# Patient Record
Sex: FEMALE | Race: WHITE | Employment: FULL TIME | URBAN - METROPOLITAN AREA
[De-identification: names, ages, dates, MRNs, and addresses within clinical notes are randomized per-mention and may not be internally consistent; named-entity substitution may affect disease eponyms.]

---

## 2017-01-18 ENCOUNTER — OFFICE VISIT (OUTPATIENT)
Dept: INTERNAL MEDICINE CLINIC | Facility: CLINIC | Age: 52
End: 2017-01-18

## 2017-01-18 VITALS
DIASTOLIC BLOOD PRESSURE: 76 MMHG | RESPIRATION RATE: 16 BRPM | BODY MASS INDEX: 33.58 KG/M2 | SYSTOLIC BLOOD PRESSURE: 112 MMHG | HEART RATE: 76 BPM | HEIGHT: 65.5 IN | WEIGHT: 204 LBS

## 2017-01-18 DIAGNOSIS — E53.8 B12 DEFICIENCY: ICD-10-CM

## 2017-01-18 DIAGNOSIS — Z51.81 ENCOUNTER FOR THERAPEUTIC DRUG MONITORING: Primary | ICD-10-CM

## 2017-01-18 DIAGNOSIS — E66.01 MORBID OBESITY WITH BMI OF 40.0-44.9, ADULT (HCC): ICD-10-CM

## 2017-01-18 DIAGNOSIS — E03.9 HYPOTHYROIDISM, UNSPECIFIED TYPE: ICD-10-CM

## 2017-01-18 DIAGNOSIS — L65.9 HAIR LOSS: ICD-10-CM

## 2017-01-18 DIAGNOSIS — Z83.2 FAMILY HISTORY OF ANEMIA: ICD-10-CM

## 2017-01-18 PROCEDURE — 99213 OFFICE O/P EST LOW 20 MIN: CPT | Performed by: NURSE PRACTITIONER

## 2017-01-18 PROCEDURE — 96372 THER/PROPH/DIAG INJ SC/IM: CPT | Performed by: NURSE PRACTITIONER

## 2017-01-18 RX ORDER — METFORMIN HYDROCHLORIDE 750 MG/1
750 TABLET, EXTENDED RELEASE ORAL DAILY
Qty: 30 TABLET | Refills: 2 | Status: SHIPPED | OUTPATIENT
Start: 2017-01-18 | End: 2017-03-10

## 2017-01-18 RX ORDER — PHENTERMINE HYDROCHLORIDE 15 MG/1
15 CAPSULE ORAL EVERY MORNING
Qty: 30 CAPSULE | Refills: 1 | Status: SHIPPED | OUTPATIENT
Start: 2017-01-18 | End: 2017-03-10

## 2017-01-18 RX ORDER — CYANOCOBALAMIN 1000 UG/ML
1000 INJECTION INTRAMUSCULAR; SUBCUTANEOUS ONCE
Status: COMPLETED | OUTPATIENT
Start: 2017-01-18 | End: 2017-01-18

## 2017-01-18 RX ADMIN — CYANOCOBALAMIN 1000 MCG: 1000 INJECTION INTRAMUSCULAR; SUBCUTANEOUS at 17:42:00

## 2017-01-18 NOTE — PROGRESS NOTES
CC: Patient presents with:  Weight Check: lost 1 pound       HPI:         Current Outpatient Prescriptions:  Phentermine HCl 15 MG Oral Cap Take 1 capsule (15 mg total) by mouth every morning.  Disp: 30 capsule Rfl: 1   MetFORMIN HCl  MG Oral Ta change her diet.  Congratulated her on great hard work and weighed on body fat scale 38%  Reminded about sleep study. 5th B12 injection today. Encouraged to find someplace to do cardio even taking a class.      The patient indicates understanding of these i

## 2017-01-18 NOTE — PATIENT INSTRUCTIONS
Diagnosing a Thyroid Problem     Fine needle aspiration. Your healthcare provider thinks you may have a thyroid problem. In many cases, a thyroid problem can be easy to diagnose.  Your healthcare provider is likely to ask about your medical history, giv · Ultrasound. This test uses sound waves to create an image showing the size and shape of the thyroid gland. It is most often used to check for a lump in the thyroid (nodule) or enlarged thyroid(goiter).   · Radioactive iodine uptake test. This test measure

## 2017-01-21 NOTE — PROGRESS NOTES
CC: Patient presents with:  Weight Check: lost 1 pound       HPI:   Obesity. Patient tolerating phentermine and metformin without side effects and has been meeting with Maribel Douglas regularly.   Was a bit more difficult to resist sweets over holidays but she MetFORMIN HCl  MG Oral Tablet 24 Hr 30 tablet 2      Sig: Take 1 tablet (750 mg total) by mouth daily.           None     ASSESSMENT:   Encounter for therapeutic drug monitoring  (primary encounter diagnosis)  Morbid obesity with BMI of 40.0-44.9,

## 2017-01-26 ENCOUNTER — OFFICE VISIT (OUTPATIENT)
Dept: INTERNAL MEDICINE CLINIC | Facility: CLINIC | Age: 52
End: 2017-01-26

## 2017-01-26 DIAGNOSIS — E66.9 OBESITY (BMI 30.0-34.9): ICD-10-CM

## 2017-01-26 PROCEDURE — 97803 MED NUTRITION INDIV SUBSEQ: CPT | Performed by: DIETITIAN, REGISTERED

## 2017-01-27 VITALS — BODY MASS INDEX: 33 KG/M2 | WEIGHT: 203 LBS

## 2017-01-27 NOTE — PROGRESS NOTES
FOLLOW UP NUTRITION CONSULTATION    Nutrition Assessment    Number of consultations with dietitian: 6    Height:  Ht Readings from Last 1 Encounters:  01/18/17 : 65.5\"      Weight:   Wt Readings from Last 2 Encounters:  01/26/17 : 203 lb  01/18/17 : 20

## 2017-02-23 ENCOUNTER — OFFICE VISIT (OUTPATIENT)
Dept: INTERNAL MEDICINE CLINIC | Facility: CLINIC | Age: 52
End: 2017-02-23

## 2017-02-23 DIAGNOSIS — E66.9 OBESITY (BMI 30.0-34.9): ICD-10-CM

## 2017-02-23 PROCEDURE — 97803 MED NUTRITION INDIV SUBSEQ: CPT | Performed by: DIETITIAN, REGISTERED

## 2017-02-26 VITALS — BODY MASS INDEX: 33 KG/M2 | WEIGHT: 199 LBS

## 2017-02-27 NOTE — PROGRESS NOTES
FOLLOW UP NUTRITION CONSULTATION    Nutrition Assessment    Number of consultations with dietitian: 6    Height:  Ht Readings from Last 1 Encounters:  01/18/17 : 65.5\"      Weight:   Wt Readings from Last 2 Encounters:  02/23/17 : 199 lb  01/26/17 : 20

## 2017-03-04 ENCOUNTER — LAB ENCOUNTER (OUTPATIENT)
Dept: LAB | Age: 52
End: 2017-03-04
Attending: NURSE PRACTITIONER
Payer: COMMERCIAL

## 2017-03-04 DIAGNOSIS — L65.9 LOSS OF HAIR: ICD-10-CM

## 2017-03-04 DIAGNOSIS — E03.9 HYPOTHYROIDISM, UNSPECIFIED TYPE: ICD-10-CM

## 2017-03-04 DIAGNOSIS — L65.9 HAIR LOSS: ICD-10-CM

## 2017-03-04 DIAGNOSIS — R63.5 ABNORMAL WEIGHT GAIN: Primary | ICD-10-CM

## 2017-03-04 DIAGNOSIS — N92.0 MENORRHAGIA WITH REGULAR CYCLE: ICD-10-CM

## 2017-03-04 LAB
ALBUMIN SERPL-MCNC: 3.4 G/DL (ref 3.5–4.8)
ALP LIVER SERPL-CCNC: 65 U/L (ref 39–100)
ALT SERPL-CCNC: 26 U/L (ref 14–54)
AST SERPL-CCNC: 14 U/L (ref 15–41)
BASOPHILS # BLD AUTO: 0.04 X10(3) UL (ref 0–0.1)
BASOPHILS NFR BLD AUTO: 0.7 %
BILIRUB SERPL-MCNC: 0.6 MG/DL (ref 0.1–2)
BUN BLD-MCNC: 15 MG/DL (ref 8–20)
CALCIUM BLD-MCNC: 9.3 MG/DL (ref 8.3–10.3)
CHLORIDE: 110 MMOL/L (ref 101–111)
CHOLEST SMN-MCNC: 162 MG/DL (ref ?–200)
CO2: 28 MMOL/L (ref 22–32)
CREAT BLD-MCNC: 0.77 MG/DL (ref 0.55–1.02)
DEPRECATED HBV CORE AB SER IA-ACNC: 144.4 NG/ML (ref 10–291)
EOSINOPHIL # BLD AUTO: 0.1 X10(3) UL (ref 0–0.3)
EOSINOPHIL NFR BLD AUTO: 1.7 %
ERYTHROCYTE [DISTWIDTH] IN BLOOD BY AUTOMATED COUNT: 12.7 % (ref 11.5–16)
FOLATE (FOLIC ACID), SERUM: 6.8 NG/ML (ref 8.7–24)
FREE T4: 1.4 NG/DL (ref 0.9–1.8)
GLUCOSE BLD-MCNC: 90 MG/DL (ref 70–99)
HAV AB SERPL IA-ACNC: 550 PG/ML (ref 193–986)
HCT VFR BLD AUTO: 40.7 % (ref 34–50)
HDLC SERPL-MCNC: 58 MG/DL (ref 45–?)
HDLC SERPL: 2.79 {RATIO} (ref ?–4.44)
HGB BLD-MCNC: 13.1 G/DL (ref 12–16)
IMMATURE GRANULOCYTE COUNT: 0.02 X10(3) UL (ref 0–1)
IMMATURE GRANULOCYTE RATIO %: 0.3 %
LDLC SERPL CALC-MCNC: 93 MG/DL (ref ?–130)
LYMPHOCYTES # BLD AUTO: 2.55 X10(3) UL (ref 0.9–4)
LYMPHOCYTES NFR BLD AUTO: 42.4 %
M PROTEIN MFR SERPL ELPH: 7 G/DL (ref 6.1–8.3)
MCH RBC QN AUTO: 29.6 PG (ref 27–33.2)
MCHC RBC AUTO-ENTMCNC: 32.2 G/DL (ref 31–37)
MCV RBC AUTO: 92.1 FL (ref 81–100)
MONOCYTES # BLD AUTO: 0.5 X10(3) UL (ref 0.1–0.6)
MONOCYTES NFR BLD AUTO: 8.3 %
NEUTROPHIL ABS PRELIM: 2.8 X10 (3) UL (ref 1.3–6.7)
NEUTROPHILS # BLD AUTO: 2.8 X10(3) UL (ref 1.3–6.7)
NEUTROPHILS NFR BLD AUTO: 46.6 %
NONHDLC SERPL-MCNC: 104 MG/DL (ref ?–130)
PLATELET # BLD AUTO: 290 10(3)UL (ref 150–450)
POTASSIUM SERPL-SCNC: 4.4 MMOL/L (ref 3.6–5.1)
RBC # BLD AUTO: 4.42 X10(6)UL (ref 3.8–5.1)
RED CELL DISTRIBUTION WIDTH-SD: 42.5 FL (ref 35.1–46.3)
SODIUM SERPL-SCNC: 146 MMOL/L (ref 136–144)
T3FREE SERPL-MCNC: 2.25 PG/ML (ref 2.3–4.2)
TRIGLYCERIDES: 57 MG/DL (ref ?–150)
TSI SER-ACNC: 1 MIU/ML (ref 0.35–5.5)
VLDL: 11 MG/DL (ref 5–40)
WBC # BLD AUTO: 6 X10(3) UL (ref 4–13)

## 2017-03-04 PROCEDURE — 82746 ASSAY OF FOLIC ACID SERUM: CPT

## 2017-03-04 PROCEDURE — 84481 FREE ASSAY (FT-3): CPT

## 2017-03-04 PROCEDURE — 84439 ASSAY OF FREE THYROXINE: CPT

## 2017-03-04 PROCEDURE — 36415 COLL VENOUS BLD VENIPUNCTURE: CPT

## 2017-03-04 PROCEDURE — 80061 LIPID PANEL: CPT

## 2017-03-04 PROCEDURE — 82607 VITAMIN B-12: CPT

## 2017-03-04 PROCEDURE — 85025 COMPLETE CBC W/AUTO DIFF WBC: CPT

## 2017-03-04 PROCEDURE — 82728 ASSAY OF FERRITIN: CPT

## 2017-03-04 PROCEDURE — 84482 T3 REVERSE: CPT

## 2017-03-04 PROCEDURE — 84443 ASSAY THYROID STIM HORMONE: CPT

## 2017-03-04 PROCEDURE — 86038 ANTINUCLEAR ANTIBODIES: CPT

## 2017-03-04 PROCEDURE — 80053 COMPREHEN METABOLIC PANEL: CPT

## 2017-03-05 LAB — ANA SCREEN: NEGATIVE

## 2017-03-08 LAB — TRIIODOTHYRONINE, REVERSE: 23.9 NG/DL

## 2017-03-10 ENCOUNTER — OFFICE VISIT (OUTPATIENT)
Dept: INTERNAL MEDICINE CLINIC | Facility: CLINIC | Age: 52
End: 2017-03-10

## 2017-03-10 VITALS
HEIGHT: 65.5 IN | WEIGHT: 198 LBS | DIASTOLIC BLOOD PRESSURE: 80 MMHG | BODY MASS INDEX: 32.59 KG/M2 | HEART RATE: 80 BPM | SYSTOLIC BLOOD PRESSURE: 132 MMHG | RESPIRATION RATE: 16 BRPM

## 2017-03-10 DIAGNOSIS — E66.01 MORBID OBESITY WITH BMI OF 40.0-44.9, ADULT (HCC): ICD-10-CM

## 2017-03-10 DIAGNOSIS — Z51.81 ENCOUNTER FOR THERAPEUTIC DRUG MONITORING: Primary | ICD-10-CM

## 2017-03-10 PROCEDURE — 99213 OFFICE O/P EST LOW 20 MIN: CPT | Performed by: NURSE PRACTITIONER

## 2017-03-10 PROCEDURE — 96372 THER/PROPH/DIAG INJ SC/IM: CPT | Performed by: NURSE PRACTITIONER

## 2017-03-10 RX ORDER — CYANOCOBALAMIN 1000 UG/ML
1000 INJECTION INTRAMUSCULAR; SUBCUTANEOUS ONCE
Status: COMPLETED | OUTPATIENT
Start: 2017-03-10 | End: 2017-03-10

## 2017-03-10 RX ORDER — PHENTERMINE HYDROCHLORIDE 15 MG/1
15 CAPSULE ORAL EVERY MORNING
Qty: 30 CAPSULE | Refills: 1 | Status: SHIPPED | OUTPATIENT
Start: 2017-03-10 | End: 2017-05-09

## 2017-03-10 RX ORDER — METFORMIN HYDROCHLORIDE 750 MG/1
750 TABLET, EXTENDED RELEASE ORAL DAILY
Qty: 30 TABLET | Refills: 2 | Status: SHIPPED | OUTPATIENT
Start: 2017-03-10 | End: 2017-06-09

## 2017-03-10 RX ADMIN — CYANOCOBALAMIN 1000 MCG: 1000 INJECTION INTRAMUSCULAR; SUBCUTANEOUS at 17:01:00

## 2017-03-10 NOTE — PATIENT INSTRUCTIONS
Weight Management: Take It Off and Keep It Off  It’s easy to be motivated when you first start. The key is to stay motivated all along the way and to have realistic and achievable goals.  There are things you can do to keep yourself on the path to success · Make a list of the things that others like about you and that you like about yourself. Add something new from time to time. Keep this list to look at when you need a lift. Resources  · The [employer] Nutritionwww. fitness. go

## 2017-03-10 NOTE — PROGRESS NOTES
CC: Patient presents with:  Weight Check: lost 6 pounds       HPI:   Obesity. Patient continues to tolerate phentermine and metformin without problem but had not been in since January as had the flu so had to cancel last scheduled appt.   She did not diagnosis)  Morbid obesity with BMI of 40.0-44.9, adult (HCC)     PLAN:  1.  Morbid Obesity (BMI 40.0-44.9).   Patient lost 42 lbs on 6 month of [id number] and metformin.  Denies chest pain willl continue phentemrine and  Metformin.  Meeting with Rafal Scott

## 2017-04-24 ENCOUNTER — OFFICE VISIT (OUTPATIENT)
Dept: INTERNAL MEDICINE CLINIC | Facility: CLINIC | Age: 52
End: 2017-04-24

## 2017-04-24 VITALS — WEIGHT: 198 LBS | BODY MASS INDEX: 32 KG/M2

## 2017-04-24 DIAGNOSIS — E66.9 OBESITY (BMI 30.0-34.9): ICD-10-CM

## 2017-04-24 PROCEDURE — 97803 MED NUTRITION INDIV SUBSEQ: CPT | Performed by: DIETITIAN, REGISTERED

## 2017-04-25 NOTE — PROGRESS NOTES
FOLLOW UP NUTRITION CONSULTATION    Nutrition Assessment    Number of consultations with dietitian: 7    Height:  Ht Readings from Last 1 Encounters:  03/10/17 : 65.5\"      Weight:   Wt Readings from Last 2 Encounters:  04/24/17 : 198 lb  03/10/17 : 19

## 2017-05-22 RX ORDER — PHENTERMINE HYDROCHLORIDE 15 MG/1
CAPSULE ORAL
Qty: 30 CAPSULE | Refills: 0 | OUTPATIENT
Start: 2017-05-22

## 2017-05-22 NOTE — TELEPHONE ENCOUNTER
Requesting Phentermine   LOV: 3/7/17  RTC: 4 weeks   Last Labs: n/a   Filled: 3/10/17 #30 with 1 refills    Future Appointments  Date Time Provider [place]   5/22/2017 5:00 PM Jules Harman RD [facility]   6/9/2017 8:40 AM Amari Javier

## 2017-05-31 ENCOUNTER — TELEPHONE (OUTPATIENT)
Dept: INTERNAL MEDICINE CLINIC | Facility: CLINIC | Age: 52
End: 2017-05-31

## 2017-06-09 ENCOUNTER — OFFICE VISIT (OUTPATIENT)
Dept: INTERNAL MEDICINE CLINIC | Facility: CLINIC | Age: 52
End: 2017-06-09

## 2017-06-09 VITALS
WEIGHT: 196 LBS | RESPIRATION RATE: 16 BRPM | DIASTOLIC BLOOD PRESSURE: 76 MMHG | HEIGHT: 65.5 IN | SYSTOLIC BLOOD PRESSURE: 110 MMHG | HEART RATE: 80 BPM | BODY MASS INDEX: 32.26 KG/M2

## 2017-06-09 DIAGNOSIS — E66.01 MORBID OBESITY WITH BMI OF 40.0-44.9, ADULT (HCC): ICD-10-CM

## 2017-06-09 DIAGNOSIS — Z51.81 ENCOUNTER FOR THERAPEUTIC DRUG MONITORING: Primary | ICD-10-CM

## 2017-06-09 PROCEDURE — 99213 OFFICE O/P EST LOW 20 MIN: CPT | Performed by: NURSE PRACTITIONER

## 2017-06-09 RX ORDER — LEVOTHYROXINE SODIUM 88 UG/1
88 TABLET ORAL
COMMUNITY
End: 2018-02-08

## 2017-06-09 RX ORDER — PHENTERMINE HYDROCHLORIDE 15 MG/1
15 CAPSULE ORAL EVERY MORNING
Qty: 30 CAPSULE | Refills: 1 | Status: CANCELLED | OUTPATIENT
Start: 2017-06-09 | End: 2017-08-08

## 2017-06-09 RX ORDER — METFORMIN HYDROCHLORIDE 750 MG/1
750 TABLET, EXTENDED RELEASE ORAL
COMMUNITY
End: 2017-06-09

## 2017-06-09 RX ORDER — PHENTERMINE HYDROCHLORIDE 37.5 MG/1
37.5 TABLET ORAL
Qty: 30 TABLET | Refills: 0 | Status: SHIPPED | OUTPATIENT
Start: 2017-06-09 | End: 2017-07-09

## 2017-06-09 RX ORDER — LACTOBACIL 2/BIFIDO 1/S.THERMO 450B CELL
1 PACKET (EA) ORAL EVERY MORNING
Qty: 60 CAPSULE | Refills: 0 | Status: SHIPPED | OUTPATIENT
Start: 2017-06-09 | End: 2017-06-09 | Stop reason: CLARIF

## 2017-06-09 RX ORDER — LACTOBACIL 2/BIFIDO 1/S.THERMO 450B CELL
1 PACKET (EA) ORAL EVERY MORNING
Qty: 60 CAPSULE | Refills: 0 | Status: SHIPPED | COMMUNITY
Start: 2017-06-09 | End: 2018-04-16 | Stop reason: ALTCHOICE

## 2017-06-09 RX ORDER — METFORMIN HYDROCHLORIDE 750 MG/1
750 TABLET, EXTENDED RELEASE ORAL DAILY
Qty: 30 TABLET | Refills: 2 | Status: SHIPPED | OUTPATIENT
Start: 2017-06-09 | End: 2017-09-07

## 2017-06-09 RX ORDER — PHENTERMINE HYDROCHLORIDE 15 MG/1
15 CAPSULE ORAL EVERY MORNING
COMMUNITY
End: 2017-06-09 | Stop reason: DRUGHIGH

## 2017-06-09 NOTE — PROGRESS NOTES
CC: Patient presents with:  Weight Check: lost 2 pounds       HPI:   Obesity. Patient has missed or had to reschedule a few appts due to work has not been in since 3/3/17. . 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  Morbid Obesity (BMI 40.0-44.9).   Patient lost 2 lbs on 3  month of phentermine 15mg and metformin.  Net loss is 44lb.   Denies chest pain willl continue phentermine but increase do

## 2017-06-09 NOTE — PATIENT INSTRUCTIONS
More Tips for Healthy Eating Out     Balance out your calories. If you're going out for dinner at a nice restaurant, make healthier choices during the day. Be creative when eating out. Many places don’t make you stick strictly to the menu.  Instead of o

## 2017-06-12 ENCOUNTER — TELEPHONE (OUTPATIENT)
Dept: INTERNAL MEDICINE CLINIC | Facility: CLINIC | Age: 52
End: 2017-06-12

## 2017-06-12 NOTE — TELEPHONE ENCOUNTER
Print Trail   Printed On Printed By Printed To Report   6/9/17 9:06 AM LEIDY Hwang [id number] EDW AMB MULTI MEDSCRIPT MASTER ARETHA CONTROLLED MEDS       Reconciliation Audit      Per records script was printed and given to patient.  Can she ch

## 2017-06-19 ENCOUNTER — OFFICE VISIT (OUTPATIENT)
Dept: INTERNAL MEDICINE CLINIC | Facility: CLINIC | Age: 52
End: 2017-06-19

## 2017-06-19 DIAGNOSIS — E66.9 OBESITY (BMI 30.0-34.9): ICD-10-CM

## 2017-06-19 PROCEDURE — 97803 MED NUTRITION INDIV SUBSEQ: CPT | Performed by: DIETITIAN, REGISTERED

## 2017-06-19 NOTE — PROGRESS NOTES
FOLLOW UP NUTRITION CONSULTATION    Nutrition Assessment    Number of consultations with dietitian: 8    Height:  Ht Readings from Last 1 Encounters:  06/09/17 : 65.5\"      Weight:   Wt Readings from Last 2 Encounters:  06/09/17 : 196 lb  04/24/17 : 19

## 2017-07-21 ENCOUNTER — OFFICE VISIT (OUTPATIENT)
Dept: INTERNAL MEDICINE CLINIC | Facility: CLINIC | Age: 52
End: 2017-07-21

## 2017-07-21 VITALS
BODY MASS INDEX: 31.77 KG/M2 | WEIGHT: 193 LBS | DIASTOLIC BLOOD PRESSURE: 60 MMHG | HEIGHT: 65.5 IN | HEART RATE: 72 BPM | SYSTOLIC BLOOD PRESSURE: 112 MMHG | RESPIRATION RATE: 16 BRPM

## 2017-07-21 VITALS — WEIGHT: 192.88 LBS | BODY MASS INDEX: 32 KG/M2

## 2017-07-21 DIAGNOSIS — E66.01 MORBID OBESITY WITH BMI OF 40.0-44.9, ADULT (HCC): Primary | ICD-10-CM

## 2017-07-21 DIAGNOSIS — E66.9 OBESITY (BMI 30.0-34.9): ICD-10-CM

## 2017-07-21 DIAGNOSIS — Z51.81 ENCOUNTER FOR THERAPEUTIC DRUG MONITORING: ICD-10-CM

## 2017-07-21 PROCEDURE — 97803 MED NUTRITION INDIV SUBSEQ: CPT | Performed by: DIETITIAN, REGISTERED

## 2017-07-21 PROCEDURE — 99213 OFFICE O/P EST LOW 20 MIN: CPT | Performed by: NURSE PRACTITIONER

## 2017-07-21 RX ORDER — PHENTERMINE HYDROCHLORIDE 37.5 MG/1
37.5 TABLET ORAL
COMMUNITY
End: 2017-07-21

## 2017-07-21 RX ORDER — PHENTERMINE HYDROCHLORIDE 37.5 MG/1
37.5 TABLET ORAL
Qty: 30 TABLET | Refills: 0 | Status: SHIPPED | OUTPATIENT
Start: 2017-07-21 | End: 2017-12-26

## 2017-07-21 NOTE — PROGRESS NOTES
CC: Patient presents with:  Weight Check: down 3lb       HPI:   Obesity. Patient tolerating increased phentermine and metformin. Is walking 3 miles each morning and working out at gym at night.  [facility] on [employer] therapeutic drug monitoring     PLAN:  1.  Morbid Obesity (BMI 40.0-44.9).   Patient lost 3 lbs on 6 weeks of phentermine 37.5 mg and metformin.  Net loss is 47lb.   Denies chest pain willl continue phentermine but increase dose for 2 months and continue Met

## 2017-07-21 NOTE — PROGRESS NOTES
FOLLOW UP NUTRITION CONSULTATION    Nutrition Assessment    Number of consultations with dietitian: 9    Height:  Ht Readings from Last 1 Encounters:  06/09/17 : 65.5\"      Weight:   Wt Readings from Last 2 Encounters:  07/21/17 : 192 lb 14.4 oz  06/09

## 2017-07-21 NOTE — PATIENT INSTRUCTIONS
Plan stress related activities as well before school starts up again. Keys to Managing Stress  There are several keys to managing stress. First, learn to recognize when you’re under stress and what triggers it.  Next, find positive ways of responding Relaxing can help you prevent or relieve stressful feelings. This tip may also help: When you’re facing a stressor, pause for a moment. Then take a deep breath and slowly breathe out as you count to 10.  This will help clear your mind so you can respond to Visualization is like taking a mental vacation. It frees your mind while keeping your body in a calm state. To get started, picture yourself feeling warm and relaxed. Choose a peaceful setting that appeals to you and fill in the details.  If you imagine a t

## 2017-09-01 ENCOUNTER — TELEPHONE (OUTPATIENT)
Dept: INTERNAL MEDICINE CLINIC | Facility: CLINIC | Age: 52
End: 2017-09-01

## 2017-09-01 NOTE — TELEPHONE ENCOUNTER
Per provider patient being discharged for having 6th no show on the following dates:  1/13/17 with Marcine Bellow, 4/20/17 with Marcine Bellow, 5/9/17 with Marcine Bellow, 5/22/17 with Erwin Shearing, 8/14/17 with Erwin Shearing, and 8/25/17 with Marcine Bellow.       Certified kareem

## 2017-11-28 ENCOUNTER — OFFICE VISIT (OUTPATIENT)
Dept: FAMILY MEDICINE CLINIC | Facility: CLINIC | Age: 52
End: 2017-11-28

## 2017-11-28 VITALS
BODY MASS INDEX: 33.02 KG/M2 | RESPIRATION RATE: 16 BRPM | DIASTOLIC BLOOD PRESSURE: 78 MMHG | HEART RATE: 78 BPM | WEIGHT: 203 LBS | HEIGHT: 65.75 IN | SYSTOLIC BLOOD PRESSURE: 118 MMHG | OXYGEN SATURATION: 97 % | TEMPERATURE: 98 F

## 2017-11-28 DIAGNOSIS — Z51.81 ENCOUNTER FOR THERAPEUTIC DRUG MONITORING: ICD-10-CM

## 2017-11-28 DIAGNOSIS — Z13.21 ENCOUNTER FOR VITAMIN DEFICIENCY SCREENING: ICD-10-CM

## 2017-11-28 DIAGNOSIS — E53.8 VITAMIN B 12 DEFICIENCY: ICD-10-CM

## 2017-11-28 DIAGNOSIS — E66.9 OBESITY (BMI 30-39.9): Primary | ICD-10-CM

## 2017-11-28 DIAGNOSIS — E03.9 HYPOTHYROIDISM, UNSPECIFIED TYPE: ICD-10-CM

## 2017-11-28 PROCEDURE — 99204 OFFICE O/P NEW MOD 45 MIN: CPT | Performed by: EMERGENCY MEDICINE

## 2017-11-28 RX ORDER — PHENTERMINE HYDROCHLORIDE 37.5 MG/1
TABLET ORAL
Qty: 30 TABLET | Refills: 0 | Status: SHIPPED | OUTPATIENT
Start: 2017-11-28 | End: 2018-02-08

## 2017-11-28 RX ORDER — NYSTATIN 100000 U/G
CREAM TOPICAL
Refills: 0 | COMMUNITY
Start: 2017-11-25 | End: 2018-02-08 | Stop reason: ALTCHOICE

## 2017-11-28 NOTE — PROGRESS NOTES
Chief Complaint:   Patient presents with:  Establish Care: discuss medication for weight loss. HPI:   This is a 47year old female     ESTABLISHMENT OF CARE  Previously seen at Weight loss clinic. Lost total of 60 lbs on phentermine and Metformin.  Josh Cramp systems is negative except those stated as above    PHYSICAL EXAM:   /78 (BP Location: Left arm, Patient Position: Sitting, Cuff Size: adult)   Pulse 78   Temp 98.3 °F (36.8 °C) (Oral)   Resp 16   Ht 65.75\"   Wt 203 lb   LMP 11/28/2017   SpO2 97%

## 2017-12-26 ENCOUNTER — OFFICE VISIT (OUTPATIENT)
Dept: FAMILY MEDICINE CLINIC | Facility: CLINIC | Age: 52
End: 2017-12-26

## 2017-12-26 VITALS
BODY MASS INDEX: 32.47 KG/M2 | DIASTOLIC BLOOD PRESSURE: 78 MMHG | TEMPERATURE: 98 F | HEIGHT: 66 IN | SYSTOLIC BLOOD PRESSURE: 126 MMHG | RESPIRATION RATE: 16 BRPM | HEART RATE: 78 BPM | OXYGEN SATURATION: 97 % | WEIGHT: 202 LBS

## 2017-12-26 DIAGNOSIS — E03.9 HYPOTHYROIDISM, UNSPECIFIED TYPE: ICD-10-CM

## 2017-12-26 DIAGNOSIS — Z79.899 MEDICATION MANAGEMENT: ICD-10-CM

## 2017-12-26 DIAGNOSIS — E66.9 OBESITY (BMI 30-39.9): Primary | ICD-10-CM

## 2017-12-26 DIAGNOSIS — R03.0 ELEVATED BP WITHOUT DIAGNOSIS OF HYPERTENSION: ICD-10-CM

## 2017-12-26 PROCEDURE — 99214 OFFICE O/P EST MOD 30 MIN: CPT | Performed by: EMERGENCY MEDICINE

## 2017-12-26 RX ORDER — LISINOPRIL 20 MG/1
1 TABLET ORAL DAILY
Refills: 0 | COMMUNITY
Start: 2017-12-07 | End: 2018-02-08 | Stop reason: ALTCHOICE

## 2017-12-26 RX ORDER — COVID-19 ANTIGEN TEST
220 KIT MISCELLANEOUS
COMMUNITY
End: 2018-02-08 | Stop reason: ALTCHOICE

## 2017-12-26 NOTE — PROGRESS NOTES
Chief Complaint:   Patient presents with: Follow - Up: f/u meds    HPI:   This is a 47year old female     WEIGHT LOSS  Ff up weight loss. 3 weeks ago underwent a total hysterectomy for endometrial hyperplasia. Was prev on OCP.    Never started Phentermine the HPI.       PHYSICAL EXAM:   /78 (BP Location: Right arm, Patient Position: Sitting, Cuff Size: large)   Pulse 78   Temp 97.9 °F (36.6 °C) (Oral)   Resp 16   Ht 66\"   Wt 202 lb   LMP 11/28/2017   SpO2 97%   BMI 32.60 kg/m²  Estimated body mass ind

## 2017-12-26 NOTE — PATIENT INSTRUCTIONS
Thank you for choosing [facility]  To Do:  [facility]  1. Have blood tests done  2. Follow-up in 1 month's  3. Okay to resume metformin and phentermine  4. Monitor BP closely at home  5. OK to hold lisinopril for now  1.              Gino Keenan sure way to regain any weight you’ve lost. The lifestyle changes that help you lose extra weight can also help keep it off. This is why you need to make realistic changes you can stick with.   Fiction: “Low-fat and fat-free mean low-calorie.”  Fact: All yecenia these changes. When you can stick to this plan, keep making a few more small changes. Taking small steps will help you stay on the path to success. Track your progress  Write down your goals. Then, keep a daily record of your progress.  Write down what you

## 2017-12-28 PROBLEM — R03.0 ELEVATED BP WITHOUT DIAGNOSIS OF HYPERTENSION: Status: ACTIVE | Noted: 2017-12-28

## 2017-12-28 PROBLEM — E03.9 HYPOTHYROIDISM: Status: ACTIVE | Noted: 2017-12-28

## 2018-02-08 ENCOUNTER — OFFICE VISIT (OUTPATIENT)
Dept: FAMILY MEDICINE CLINIC | Facility: CLINIC | Age: 53
End: 2018-02-08

## 2018-02-08 ENCOUNTER — LAB ENCOUNTER (OUTPATIENT)
Dept: LAB | Age: 53
End: 2018-02-08
Attending: EMERGENCY MEDICINE
Payer: COMMERCIAL

## 2018-02-08 VITALS
HEIGHT: 66 IN | HEART RATE: 72 BPM | OXYGEN SATURATION: 99 % | RESPIRATION RATE: 16 BRPM | SYSTOLIC BLOOD PRESSURE: 128 MMHG | TEMPERATURE: 98 F | WEIGHT: 201 LBS | BODY MASS INDEX: 32.3 KG/M2 | DIASTOLIC BLOOD PRESSURE: 82 MMHG

## 2018-02-08 DIAGNOSIS — D64.9 ANEMIA, UNSPECIFIED TYPE: ICD-10-CM

## 2018-02-08 DIAGNOSIS — Z13.21 ENCOUNTER FOR VITAMIN DEFICIENCY SCREENING: ICD-10-CM

## 2018-02-08 DIAGNOSIS — E66.9 OBESITY (BMI 30-39.9): ICD-10-CM

## 2018-02-08 DIAGNOSIS — E03.9 HYPOTHYROIDISM, UNSPECIFIED TYPE: ICD-10-CM

## 2018-02-08 DIAGNOSIS — Z51.81 ENCOUNTER FOR THERAPEUTIC DRUG MONITORING: ICD-10-CM

## 2018-02-08 DIAGNOSIS — E66.9 OBESITY (BMI 30-39.9): Primary | ICD-10-CM

## 2018-02-08 DIAGNOSIS — Z80.0 FAMILY HISTORY OF COLON CANCER IN MOTHER: ICD-10-CM

## 2018-02-08 DIAGNOSIS — E53.8 VITAMIN B 12 DEFICIENCY: ICD-10-CM

## 2018-02-08 LAB
25-HYDROXYVITAMIN D (TOTAL): 41.5 NG/ML (ref 30–100)
ANTI-THYROGLOBULIN: <15 U/ML (ref ?–60)
ANTI-THYROPEROXIDASE: 31 U/ML (ref ?–60)
BASOPHILS # BLD AUTO: 0.07 X10(3) UL (ref 0–0.1)
BASOPHILS NFR BLD AUTO: 1 %
CHOLEST SMN-MCNC: 187 MG/DL (ref ?–200)
DEPRECATED HBV CORE AB SER IA-ACNC: 71.1 NG/ML (ref 10–291)
EOSINOPHIL # BLD AUTO: 0.1 X10(3) UL (ref 0–0.3)
EOSINOPHIL NFR BLD AUTO: 1.5 %
ERYTHROCYTE [DISTWIDTH] IN BLOOD BY AUTOMATED COUNT: 12.9 % (ref 11.5–16)
EST. AVERAGE GLUCOSE BLD GHB EST-MCNC: 105 MG/DL (ref 68–126)
HAV AB SERPL IA-ACNC: 394 PG/ML (ref 193–986)
HBA1C MFR BLD HPLC: 5.3 % (ref ?–5.7)
HCT VFR BLD AUTO: 43.6 % (ref 34–50)
HDLC SERPL-MCNC: 70 MG/DL (ref 45–?)
HDLC SERPL: 2.67 {RATIO} (ref ?–4.44)
HGB BLD-MCNC: 13.4 G/DL (ref 12–16)
IMMATURE GRANULOCYTE COUNT: 0.01 X10(3) UL (ref 0–1)
IMMATURE GRANULOCYTE RATIO %: 0.1 %
IRON SATURATION: 17 % (ref 13–45)
IRON: 72 UG/DL (ref 28–170)
LDLC SERPL CALC-MCNC: 107 MG/DL (ref ?–130)
LYMPHOCYTES # BLD AUTO: 2.63 X10(3) UL (ref 0.9–4)
LYMPHOCYTES NFR BLD AUTO: 39.3 %
MCH RBC QN AUTO: 27.9 PG (ref 27–33.2)
MCHC RBC AUTO-ENTMCNC: 30.7 G/DL (ref 31–37)
MCV RBC AUTO: 90.8 FL (ref 81–100)
MONOCYTES # BLD AUTO: 0.45 X10(3) UL (ref 0.1–1)
MONOCYTES NFR BLD AUTO: 6.7 %
NEUTROPHIL ABS PRELIM: 3.44 X10 (3) UL (ref 1.3–6.7)
NEUTROPHILS # BLD AUTO: 3.44 X10(3) UL (ref 1.3–6.7)
NEUTROPHILS NFR BLD AUTO: 51.4 %
NONHDLC SERPL-MCNC: 117 MG/DL (ref ?–130)
PLATELET # BLD AUTO: 332 10(3)UL (ref 150–450)
RBC # BLD AUTO: 4.8 X10(6)UL (ref 3.8–5.1)
RED CELL DISTRIBUTION WIDTH-SD: 42.5 FL (ref 35.1–46.3)
TOTAL IRON BINDING CAPACITY: 431 UG/DL (ref 298–536)
TRANSFERRIN: 289 MG/DL (ref 200–360)
TRIGL SERPL-MCNC: 49 MG/DL (ref ?–150)
TSI SER-ACNC: 1.26 MIU/ML (ref 0.35–5.5)
VLDLC SERPL CALC-MCNC: 10 MG/DL (ref 5–40)
WBC # BLD AUTO: 6.7 X10(3) UL (ref 4–13)

## 2018-02-08 PROCEDURE — 83550 IRON BINDING TEST: CPT | Performed by: EMERGENCY MEDICINE

## 2018-02-08 PROCEDURE — 80061 LIPID PANEL: CPT | Performed by: EMERGENCY MEDICINE

## 2018-02-08 PROCEDURE — 84443 ASSAY THYROID STIM HORMONE: CPT | Performed by: EMERGENCY MEDICINE

## 2018-02-08 PROCEDURE — 85025 COMPLETE CBC W/AUTO DIFF WBC: CPT | Performed by: EMERGENCY MEDICINE

## 2018-02-08 PROCEDURE — 83540 ASSAY OF IRON: CPT | Performed by: EMERGENCY MEDICINE

## 2018-02-08 PROCEDURE — 36415 COLL VENOUS BLD VENIPUNCTURE: CPT | Performed by: EMERGENCY MEDICINE

## 2018-02-08 PROCEDURE — 82607 VITAMIN B-12: CPT | Performed by: EMERGENCY MEDICINE

## 2018-02-08 PROCEDURE — 83036 HEMOGLOBIN GLYCOSYLATED A1C: CPT | Performed by: EMERGENCY MEDICINE

## 2018-02-08 PROCEDURE — 82728 ASSAY OF FERRITIN: CPT | Performed by: EMERGENCY MEDICINE

## 2018-02-08 PROCEDURE — 82306 VITAMIN D 25 HYDROXY: CPT | Performed by: EMERGENCY MEDICINE

## 2018-02-08 PROCEDURE — 86376 MICROSOMAL ANTIBODY EACH: CPT | Performed by: EMERGENCY MEDICINE

## 2018-02-08 PROCEDURE — 99214 OFFICE O/P EST MOD 30 MIN: CPT | Performed by: EMERGENCY MEDICINE

## 2018-02-08 PROCEDURE — 86800 THYROGLOBULIN ANTIBODY: CPT | Performed by: EMERGENCY MEDICINE

## 2018-02-08 RX ORDER — LEVOTHYROXINE SODIUM 88 UG/1
88 TABLET ORAL
Qty: 30 TABLET | Refills: 0 | Status: SHIPPED | OUTPATIENT
Start: 2018-02-08 | End: 2019-04-23

## 2018-02-08 RX ORDER — PHENTERMINE HYDROCHLORIDE 37.5 MG/1
37.5 TABLET ORAL
Qty: 30 TABLET | Refills: 0 | Status: SHIPPED
Start: 2018-02-08 | End: 2018-04-16 | Stop reason: ALTCHOICE

## 2018-02-08 NOTE — PATIENT INSTRUCTIONS
Thank you for choosing [facility]  To Do:  [facility]  1. Try to exercise regulary  2. Continue with phentermine  3. Have blood tests done today  4. Follow up with dietician  5. Need to obtain colonoscopy results  1.                  O

## 2018-02-08 NOTE — PROGRESS NOTES
Chief Complaint:   Patient presents with:  Weight Check: Weight check     HPI:   This is a 47year old female       FF UP WEIGHT LOSS  Eating the same foods. Has been following the same diet regimen. C/O BM problems. Feels constipated.  Last BM was yesterda mother        REVIEW OF SYSTEMS:   Review of systems significant for constipation  The rest of the review of systems is negative except those stated as above    PHYSICAL EXAM:   /82   Pulse 72   Temp 97.8 °F (36.6 °C) (Oral)   Resp 16   Ht 66\"   Wt surgery. -     CBC WITH DIFFERENTIAL WITH PLATELET; Future  -     IRON AND TIBC; Future  -     FERRITIN; Future    Hypothyroidism, unspecified type  -     Levothyroxine Sodium (SYNTHROID) 88 MCG Oral Tab;  Take 1 tablet (88 mcg total) by mouth before break

## 2018-02-27 ENCOUNTER — OFFICE VISIT (OUTPATIENT)
Dept: NUTRITION | Facility: HOSPITAL | Age: 53
End: 2018-02-27
Attending: EMERGENCY MEDICINE
Payer: COMMERCIAL

## 2018-02-27 PROCEDURE — 97802 MEDICAL NUTRITION INDIV IN: CPT

## 2018-02-28 NOTE — PROGRESS NOTES
ADULT INITIAL OUTPATIENT NUTRITION CONSULTATION    Nutrition Assessment    Medical Diagnosis: Obesity    PMH: s/p hysterectomy     Client Hx: 47year old female    Meds: Metformin, Phentermine    Labs (18): CHOL: 187, HDL: 70, LDL: 107, T. bring in snacks (focused on protein, fiber, and healthy fats with whole grain carbs/fruit). Written materials were issued and explained, including meal plans and recipes for 1847-4120 kcal/day.  Pt agrees to try 1% milk, having small snacks between meals, a

## 2018-04-10 ENCOUNTER — TELEPHONE (OUTPATIENT)
Dept: FAMILY MEDICINE CLINIC | Facility: CLINIC | Age: 53
End: 2018-04-10

## 2018-04-16 ENCOUNTER — OFFICE VISIT (OUTPATIENT)
Dept: FAMILY MEDICINE CLINIC | Facility: CLINIC | Age: 53
End: 2018-04-16

## 2018-04-16 VITALS
RESPIRATION RATE: 16 BRPM | TEMPERATURE: 98 F | SYSTOLIC BLOOD PRESSURE: 122 MMHG | HEIGHT: 66 IN | DIASTOLIC BLOOD PRESSURE: 80 MMHG | WEIGHT: 214 LBS | HEART RATE: 82 BPM | BODY MASS INDEX: 34.39 KG/M2 | OXYGEN SATURATION: 98 %

## 2018-04-16 DIAGNOSIS — E66.9 OBESITY (BMI 30-39.9): Primary | ICD-10-CM

## 2018-04-16 PROCEDURE — 99213 OFFICE O/P EST LOW 20 MIN: CPT | Performed by: PHYSICIAN ASSISTANT

## 2018-04-16 RX ORDER — TOPIRAMATE 25 MG/1
25 TABLET ORAL 2 TIMES DAILY
Qty: 30 TABLET | Refills: 0 | Status: CANCELLED | OUTPATIENT
Start: 2018-04-16

## 2018-04-16 RX ORDER — PHENTERMINE HYDROCHLORIDE 37.5 MG/1
37.5 TABLET ORAL
Qty: 30 TABLET | Refills: 0 | Status: CANCELLED | OUTPATIENT
Start: 2018-04-16

## 2018-04-16 NOTE — PROGRESS NOTES
CC: Patient presents with:  Weight Check         HPI:  Kerrin Holter is a 47year old female who presents to follow up on weight loss. She has been on phentermine/metformin for two years.  She has lost about 60 lbs total. She walks daily [id number] steps pe daily.  - Start above medication. Side effects discussed   - Diet and exercise  - Recheck in one month.  If no weight loss will consider medication change

## 2018-06-05 ENCOUNTER — OFFICE VISIT (OUTPATIENT)
Dept: FAMILY MEDICINE CLINIC | Facility: CLINIC | Age: 53
End: 2018-06-05

## 2018-06-05 VITALS
RESPIRATION RATE: 16 BRPM | TEMPERATURE: 98 F | DIASTOLIC BLOOD PRESSURE: 78 MMHG | SYSTOLIC BLOOD PRESSURE: 120 MMHG | HEIGHT: 66 IN | OXYGEN SATURATION: 98 % | WEIGHT: 218 LBS | HEART RATE: 69 BPM | BODY MASS INDEX: 35.03 KG/M2

## 2018-06-05 DIAGNOSIS — E66.9 OBESITY (BMI 30-39.9): Primary | ICD-10-CM

## 2018-06-05 DIAGNOSIS — N89.8 VAGINAL DRYNESS: ICD-10-CM

## 2018-06-05 DIAGNOSIS — Z51.81 ENCOUNTER FOR THERAPEUTIC DRUG MONITORING: ICD-10-CM

## 2018-06-05 DIAGNOSIS — N95.1 HOT FLASH, MENOPAUSAL: ICD-10-CM

## 2018-06-05 PROCEDURE — 99213 OFFICE O/P EST LOW 20 MIN: CPT | Performed by: PHYSICIAN ASSISTANT

## 2018-06-05 RX ORDER — PHENTERMINE HYDROCHLORIDE 37.5 MG/1
37.5 TABLET ORAL
Qty: 30 TABLET | Refills: 0 | Status: CANCELLED | OUTPATIENT
Start: 2018-06-05

## 2018-06-05 NOTE — PROGRESS NOTES
CC: Patient presents with:  Medication Follow-Up         HPI:  Tyrese Banks is a 47year old female who presents to follow up on weight loss. She recently started topiramate/phentermine combo. She had a very hard time swallowing pill.  Did not lose an AM     2 tablets in PM  - Side effects discussed   - Diet and exercise encourage   - Recheck in 4 weeks     Encounter for therapeutic drug monitoring  -     Naltrexone-Bupropion HCl ER (CONTRAVE) 8-90 MG Oral Tablet 12 Hr; Week 1: 1 tablet in AM      None

## 2018-06-25 ENCOUNTER — TELEPHONE (OUTPATIENT)
Dept: FAMILY MEDICINE CLINIC | Facility: CLINIC | Age: 53
End: 2018-06-25

## 2018-06-25 DIAGNOSIS — E66.9 OBESITY (BMI 30-39.9): ICD-10-CM

## 2018-06-25 NOTE — TELEPHONE ENCOUNTER
Ok to stop Contrave  Stop Phentermine as well  Will Rx Qsymia, Rx will be faxed  Follow up as directed

## 2018-06-25 NOTE — TELEPHONE ENCOUNTER
Please see below message. Patient is requesting to switch from Contrave back to Qsymia. Please advise. Thank you!

## 2018-06-25 NOTE — TELEPHONE ENCOUNTER
Patient Sherrie Quezada recently started taking Naltrexone-Bupropion HCl ER (CONTRAVE) 8-90 MG Oral Tablet 12 Hr  Is experiencing headaches, wants to stop taking this medication and restart previous med, listed below.   Carlo/Miki on file        Phentermine

## 2018-09-21 ENCOUNTER — OFFICE VISIT (OUTPATIENT)
Dept: FAMILY MEDICINE CLINIC | Facility: CLINIC | Age: 53
End: 2018-09-21
Payer: COMMERCIAL

## 2018-09-21 VITALS
DIASTOLIC BLOOD PRESSURE: 80 MMHG | SYSTOLIC BLOOD PRESSURE: 130 MMHG | RESPIRATION RATE: 16 BRPM | TEMPERATURE: 99 F | HEART RATE: 96 BPM | BODY MASS INDEX: 37.99 KG/M2 | WEIGHT: 228 LBS | HEIGHT: 65 IN | OXYGEN SATURATION: 97 %

## 2018-09-21 DIAGNOSIS — J06.9 ACUTE URI: Primary | ICD-10-CM

## 2018-09-21 PROCEDURE — 99213 OFFICE O/P EST LOW 20 MIN: CPT | Performed by: NURSE PRACTITIONER

## 2018-09-21 RX ORDER — ERGOCALCIFEROL 1.25 MG/1
1 CAPSULE ORAL WEEKLY
COMMUNITY
Start: 2018-08-01

## 2018-09-21 NOTE — PATIENT INSTRUCTIONS
Use saline nasal spray  Gargle with warm salt water    Adult Self-Care for Colds    Colds are caused by viruses. They can't be cured with antibiotics. However, you can ease symptoms and support your body's efforts to heal itself.  No matter which symptoms When you first notice symptoms, ask your healthcare provider if antiviral medicines are appropriate. Antibiotics should not be taken for colds or flu.  Also, call your healthcare provider if you have any of the following symptoms or if you aren't feeling be

## 2018-09-21 NOTE — PROGRESS NOTES
CHIEF COMPLAINT:   Patient presents with:  Nasal Congestion: Loss of voice 2-3 days. HPI:   Mike Pierson is a 47year old female who presents for upper respiratory symptoms for  3 days. Patient reports mild nasal congestion and mild sore throat. EARS: TM's clear gray, no bulging, no retraction,no fluid, bony landmarks visualized  NOSE: Nostrils patent, pale yellow/green nasal discharge, nasal mucosa mildly inflamed  THROAT: Oral mucosa pink, moist. Posterior pharynx is minimally erythematous.  No e · Breathe steam or heated humidified air to open blocked nasal passages.  a hot shower or use a vaporizer. Be careful not to get burned by the steam.  · Saline nasal sprays and decongestant tablets help open a stuffy nose.  Antihistamines can also h © 2460-7753 [employer] [address]. [address] [facility] [place] [facility], [address] [place]. All rights reserved. This information is not intended as a substitute for professional medical care. Always follow your healthcare professional's instructions.             The

## 2018-10-02 ENCOUNTER — TELEPHONE (OUTPATIENT)
Dept: FAMILY MEDICINE CLINIC | Facility: CLINIC | Age: 53
End: 2018-10-02

## 2018-10-02 NOTE — TELEPHONE ENCOUNTER
LVM for patient that appointment was missed and she will be charged a $25.00 no show fee.  Pt arrived for appt Dr Tom Patton said that patient should not be charged for missed appt-patient was unable to get her in time due to her boss being called into jury d

## 2018-10-04 ENCOUNTER — OFFICE VISIT (OUTPATIENT)
Dept: FAMILY MEDICINE CLINIC | Facility: CLINIC | Age: 53
End: 2018-10-04
Payer: COMMERCIAL

## 2018-10-04 VITALS
WEIGHT: 229 LBS | OXYGEN SATURATION: 97 % | SYSTOLIC BLOOD PRESSURE: 110 MMHG | BODY MASS INDEX: 38 KG/M2 | DIASTOLIC BLOOD PRESSURE: 82 MMHG | RESPIRATION RATE: 17 BRPM | HEART RATE: 67 BPM

## 2018-10-04 DIAGNOSIS — Z79.899 MEDICATION MANAGEMENT: ICD-10-CM

## 2018-10-04 DIAGNOSIS — E66.9 OBESITY (BMI 30-39.9): ICD-10-CM

## 2018-10-04 DIAGNOSIS — J40 BRONCHITIS: ICD-10-CM

## 2018-10-04 DIAGNOSIS — J02.9 SORE THROAT: Primary | ICD-10-CM

## 2018-10-04 PROBLEM — I10 HYPERTENSION: Status: ACTIVE | Noted: 2018-10-04

## 2018-10-04 PROCEDURE — 99214 OFFICE O/P EST MOD 30 MIN: CPT | Performed by: EMERGENCY MEDICINE

## 2018-10-04 PROCEDURE — 87081 CULTURE SCREEN ONLY: CPT | Performed by: EMERGENCY MEDICINE

## 2018-10-04 PROCEDURE — 87880 STREP A ASSAY W/OPTIC: CPT | Performed by: EMERGENCY MEDICINE

## 2018-10-04 RX ORDER — ALBUTEROL SULFATE 90 UG/1
1-2 AEROSOL, METERED RESPIRATORY (INHALATION) EVERY 4 HOURS PRN
Qty: 1 INHALER | Refills: 1 | Status: SHIPPED | OUTPATIENT
Start: 2018-10-04 | End: 2019-01-28 | Stop reason: ALTCHOICE

## 2018-10-04 RX ORDER — METHYLPREDNISOLONE 4 MG/1
TABLET ORAL
Qty: 1 PACKAGE | Refills: 0 | Status: SHIPPED | OUTPATIENT
Start: 2018-10-04 | End: 2019-01-28 | Stop reason: ALTCHOICE

## 2018-10-04 RX ORDER — PHENTERMINE HYDROCHLORIDE 37.5 MG/1
TABLET ORAL
Qty: 30 TABLET | Refills: 0 | Status: SHIPPED
Start: 2018-10-04 | End: 2019-01-28 | Stop reason: ALTCHOICE

## 2018-10-04 NOTE — PATIENT INSTRUCTIONS
Thank you for choosing [facility]  To Do:  FOR RITA HANCOCK    · Start albuterol inhaler  · Start medrol dose pack steroid for cough/bronochitis  · Chest xray today  · Follow up in 1 month  · Start Phentermine  · Have EKG done        Viral a light diet is fine. Avoid dehydration by drinking 6 to 8 glasses of fluids per day (such as water, soft drinks, sports drinks, juices, tea, or soup). Extra fluids will help loosen secretions in the nose and lungs.   · Over-the-counter cough, cold, and sor

## 2018-10-04 NOTE — PROGRESS NOTES
Chief Complaint:   Patient presents with:  URI: Follow up, not any better since [facility] visit   Weight Check: Discuss weight gain     HPI:   This is a 47year old female       FF UP BRONCHITIS  Seen in the [facility] 10 d ago for bronchitis.  Had stabbing pain to th medications on file prior to visit. Counseling given: Not Answered         PROBLEM LIST     Patient Active Problem List:     Encounter for therapeutic drug monitoring     B12 deficiency     Obesity (BMI 30-39. 9)     Hypothyroidism     Elevated BP withou (CPT=71046); Future  -     Albuterol Sulfate HFA (PROAIR HFA) 108 (90 Base) MCG/ACT Inhalation Aero Soln; Inhale 1-2 puffs into the lungs every 4 (four) hours as needed for Wheezing or Shortness of Breath (or cough,  use with spacer).   -     methylPREDNISo

## 2018-10-05 ENCOUNTER — HOSPITAL ENCOUNTER (OUTPATIENT)
Dept: GENERAL RADIOLOGY | Age: 53
Discharge: HOME | End: 2018-10-05
Attending: EMERGENCY MEDICINE
Payer: COMMERCIAL

## 2018-10-05 DIAGNOSIS — J40 BRONCHITIS: ICD-10-CM

## 2018-10-05 PROCEDURE — 71046 X-RAY EXAM CHEST 2 VIEWS: CPT | Performed by: EMERGENCY MEDICINE

## 2018-11-06 ENCOUNTER — TELEPHONE (OUTPATIENT)
Dept: FAMILY MEDICINE CLINIC | Facility: CLINIC | Age: 53
End: 2018-11-06

## 2019-01-10 ENCOUNTER — TELEPHONE (OUTPATIENT)
Dept: FAMILY MEDICINE CLINIC | Facility: CLINIC | Age: 54
End: 2019-01-10

## 2019-01-28 ENCOUNTER — OFFICE VISIT (OUTPATIENT)
Dept: FAMILY MEDICINE CLINIC | Facility: CLINIC | Age: 54
End: 2019-01-28
Payer: COMMERCIAL

## 2019-01-28 VITALS
OXYGEN SATURATION: 98 % | WEIGHT: 233 LBS | TEMPERATURE: 98 F | DIASTOLIC BLOOD PRESSURE: 76 MMHG | RESPIRATION RATE: 16 BRPM | BODY MASS INDEX: 38.82 KG/M2 | HEART RATE: 67 BPM | SYSTOLIC BLOOD PRESSURE: 118 MMHG | HEIGHT: 65 IN

## 2019-01-28 DIAGNOSIS — Z13.29 SCREENING FOR ENDOCRINE, NUTRITIONAL, METABOLIC AND IMMUNITY DISORDER: ICD-10-CM

## 2019-01-28 DIAGNOSIS — E66.9 OBESITY (BMI 30-39.9): Primary | ICD-10-CM

## 2019-01-28 DIAGNOSIS — Z13.0 SCREENING FOR ENDOCRINE, NUTRITIONAL, METABOLIC AND IMMUNITY DISORDER: ICD-10-CM

## 2019-01-28 DIAGNOSIS — Z13.21 SCREENING FOR ENDOCRINE, NUTRITIONAL, METABOLIC AND IMMUNITY DISORDER: ICD-10-CM

## 2019-01-28 DIAGNOSIS — Z13.228 SCREENING FOR ENDOCRINE, NUTRITIONAL, METABOLIC AND IMMUNITY DISORDER: ICD-10-CM

## 2019-01-28 PROCEDURE — 99213 OFFICE O/P EST LOW 20 MIN: CPT | Performed by: EMERGENCY MEDICINE

## 2019-01-28 RX ORDER — ESTRADIOL 0.04 MG/D
PATCH TRANSDERMAL
Refills: 5 | COMMUNITY
Start: 2018-08-07 | End: 2019-01-28 | Stop reason: ALTCHOICE

## 2019-01-28 RX ORDER — INHALER, ASSIST DEVICES
SPACER (EA) MISCELLANEOUS
Refills: 0 | COMMUNITY
Start: 2018-10-04 | End: 2019-01-28 | Stop reason: ALTCHOICE

## 2019-01-28 RX ORDER — PHENTERMINE HYDROCHLORIDE 37.5 MG/1
TABLET ORAL
Qty: 30 TABLET | Refills: 0 | Status: SHIPPED
Start: 2019-01-28 | End: 2019-04-23

## 2019-01-28 RX ORDER — METFORMIN HYDROCHLORIDE 500 MG/1
TABLET, EXTENDED RELEASE ORAL
Refills: 1 | COMMUNITY
Start: 2019-01-19 | End: 2019-05-30

## 2019-01-28 NOTE — PROGRESS NOTES
Chief Complaint:   Patient presents with:  Medication Follow-Up: weight loss    HPI:   This is a 47year old female     FOLLOW UP WEIGHT LOSS  Patient lost to follow-up, on last office visit was started on phentermine.   Unsure if phentermine to help wi Acute URI     Hypertension        REVIEW OF SYSTEMS:   Review of systems significant for weight gain.   The rest of the review of systems is negative except those stated as above    PHYSICAL EXAM:   /76   Pulse 67   Temp 98 °F (36.7 °C) (Oral)   Resp fasting    Extensive discussion of weight loss strategies. Encouraged to decrease overall caloric intake in order to promote weight loss. Healthy tips given including good meal planning and preparation in order to maintain restricted calories.  Recommend

## 2019-01-28 NOTE — PATIENT INSTRUCTIONS
Thank you for choosing [facility]  To Do:  FOR [address]    · Continue with phentermine  · Keep a food diary, Good meal prep. · Overall decreased caloric intake in order to promote weight loss. Eat 8671-2251 shelbie per day.   Good meal xena healthcare provider about what goals are reasonable for you.   Believe that you can do it  How you think about yourself is just as important as what you do. If you don’t think you can succeed, chances are you won’t.  Believe that you can stick to your plan energy it needs to work. Hunger makes you more likely to overeat later on. It’s best to spread your meals throughout the day. Eat at least three meals a day.   Fiction: “I can’t start exercising until I lose weight.”  Fact: The sooner you start exercising t are made to be thin. For some people, a healthy weight is higher than the average weight listed on weight charts. Your healthcare provider can help you decide on a healthy weight for you.     Reasons to lose weight  Losing weight can help with some health p diet.  Date Last Reviewed: 4/1/2018  © 6386-6521 [employer] [address]. [address] [facility] [place] [facility] [place]. All rights reserved. This information is not intended as a substitute for professional medical care.  Always follow your healthcare profes bike  If you have health problems, be sure to ask your healthcare provider before you start an exercise program. Have a  help you develop a plan that’s safe for you. Date Last Reviewed: 4/1/2018  © 1243-1505 [employer] [address].

## 2019-04-23 ENCOUNTER — OFFICE VISIT (OUTPATIENT)
Dept: FAMILY MEDICINE CLINIC | Facility: CLINIC | Age: 54
End: 2019-04-23
Payer: COMMERCIAL

## 2019-04-23 VITALS
DIASTOLIC BLOOD PRESSURE: 84 MMHG | OXYGEN SATURATION: 97 % | RESPIRATION RATE: 17 BRPM | HEIGHT: 65 IN | SYSTOLIC BLOOD PRESSURE: 130 MMHG | BODY MASS INDEX: 39.65 KG/M2 | WEIGHT: 238 LBS | HEART RATE: 94 BPM

## 2019-04-23 DIAGNOSIS — E03.9 HYPOTHYROIDISM, UNSPECIFIED TYPE: ICD-10-CM

## 2019-04-23 DIAGNOSIS — E53.8 B12 DEFICIENCY: ICD-10-CM

## 2019-04-23 DIAGNOSIS — E66.9 OBESITY (BMI 30-39.9): Primary | ICD-10-CM

## 2019-04-23 PROCEDURE — 99214 OFFICE O/P EST MOD 30 MIN: CPT | Performed by: EMERGENCY MEDICINE

## 2019-04-23 RX ORDER — LEVOTHYROXINE SODIUM 88 UG/1
88 TABLET ORAL
Qty: 30 TABLET | Refills: 0 | Status: SHIPPED | OUTPATIENT
Start: 2019-04-23 | End: 2019-05-30

## 2019-04-23 RX ORDER — PHENTERMINE HYDROCHLORIDE 37.5 MG/1
37.5 TABLET ORAL
Qty: 30 TABLET | Refills: 0 | Status: SHIPPED
Start: 2019-04-23 | End: 2019-05-30

## 2019-04-23 RX ORDER — TOPIRAMATE 25 MG/1
25 CAPSULE, COATED PELLETS ORAL 2 TIMES DAILY
Qty: 30 CAPSULE | Refills: 1 | Status: SHIPPED | OUTPATIENT
Start: 2019-04-23 | End: 2019-05-30

## 2019-04-23 NOTE — PATIENT INSTRUCTIONS
Thank you for choosing [facility]  To Do:  FOR RITA HANCOCK    · Take full dose of Phentermine.   · Have blood tests done after fasting  · Start Topamax 25 mg Twice a day  · Follow up with [facility]  · Follow up in 1 month for annual better.”  Fact: This seems like it should be true, but it’s not. When you eat too few calories, your body acts as if it’s on a desert [address]. It thinks food is scarce, so it slows down your metabolism (how fast you burn calories) to save energy.  By eating the values on the label by the number of servings you eat.    Eat less fat  A gram of fat has almost 2.5 times the calories of a gram of protein or carbohydrates. Try to balance your food choices so that only 20% to 35% of your calories comes from total fat average weight listed on weight charts. Your healthcare provider can help you decide on a healthy weight for you.     Reasons to lose weight  Losing weight can help with some health problems, such as high blood pressure, heart disease, diabetes, sleep apnea [place] , [place], [address] [place]. All rights reserved. This information is not intended as a substitute for professional medical care. Always follow your healthcare professional's instructions.

## 2019-04-23 NOTE — PROGRESS NOTES
Chief Complaint:   Patient presents with:  Weight Loss: Discuss weight loss     HPI:   This is a 47year old female       WEIGHT LOSS  Taking only 1/2 tab of phentermine. No weight loss since last OV. Eating same foods.  Does not feel like she is over except those stated as above    PHYSICAL EXAM:   /84   Pulse 94   Resp 17   Ht 65\"   Wt 238 lb   LMP 11/28/2017   SpO2 97%   BMI 39.61 kg/m²  Estimated body mass index is 39.61 kg/m² as calculated from the following:    Height as of this encounter:

## 2019-04-24 ENCOUNTER — MED REC SCAN ONLY (OUTPATIENT)
Dept: FAMILY MEDICINE CLINIC | Facility: CLINIC | Age: 54
End: 2019-04-24

## 2019-05-30 ENCOUNTER — OFFICE VISIT (OUTPATIENT)
Dept: FAMILY MEDICINE CLINIC | Facility: CLINIC | Age: 54
End: 2019-05-30
Payer: COMMERCIAL

## 2019-05-30 VITALS
HEART RATE: 102 BPM | HEIGHT: 65 IN | WEIGHT: 230 LBS | DIASTOLIC BLOOD PRESSURE: 98 MMHG | BODY MASS INDEX: 38.32 KG/M2 | RESPIRATION RATE: 17 BRPM | SYSTOLIC BLOOD PRESSURE: 148 MMHG | OXYGEN SATURATION: 96 %

## 2019-05-30 DIAGNOSIS — R03.0 ELEVATED BP WITHOUT DIAGNOSIS OF HYPERTENSION: ICD-10-CM

## 2019-05-30 DIAGNOSIS — E03.9 HYPOTHYROIDISM, UNSPECIFIED TYPE: ICD-10-CM

## 2019-05-30 DIAGNOSIS — E66.9 OBESITY (BMI 30-39.9): Primary | ICD-10-CM

## 2019-05-30 PROCEDURE — 99214 OFFICE O/P EST MOD 30 MIN: CPT | Performed by: EMERGENCY MEDICINE

## 2019-05-30 RX ORDER — TOPIRAMATE 25 MG/1
25 CAPSULE, COATED PELLETS ORAL 2 TIMES DAILY
Qty: 60 CAPSULE | Refills: 2 | Status: SHIPPED | OUTPATIENT
Start: 2019-05-30 | End: 2019-06-11

## 2019-05-30 RX ORDER — PHENTERMINE HYDROCHLORIDE 37.5 MG/1
37.5 TABLET ORAL
Qty: 30 TABLET | Refills: 0 | Status: SHIPPED
Start: 2019-05-30 | End: 2019-06-11

## 2019-05-30 RX ORDER — LEVOTHYROXINE SODIUM 88 UG/1
88 TABLET ORAL
Qty: 30 TABLET | Refills: 0 | Status: SHIPPED | OUTPATIENT
Start: 2019-05-30 | End: 2020-11-19

## 2019-05-30 RX ORDER — METFORMIN HYDROCHLORIDE 500 MG/1
TABLET, EXTENDED RELEASE ORAL
Qty: 30 TABLET | Refills: 1 | Status: SHIPPED | OUTPATIENT
Start: 2019-05-30 | End: 2019-08-22

## 2019-05-30 NOTE — PATIENT INSTRUCTIONS
Thank you for choosing [facility]  To Do:  FOR [address]    · Follow up in 1 month  · Home BP checks  · Continue with weight loss and annual physical  · Continue with Phentermine and Topamax (2x/d)  · Continue with mindful diet  · Bring why. Many of us eat when we’re bored, stressed, or just to be polite. Listen to your body. If you’re not hungry, get busy doing something else instead of eating.   · Eat slower, shooting for 20 to 30 minutes for each meal. It takes 20 minutes for your stoma off.  Fiction: “The fewer calories I eat, the better.”  Fact: This seems like it should be true, but it’s not. When you eat too few calories, your body acts as if it’s on a desert [address].  It thinks food is scarce, so it slows down your metabolism (how fast treatments, such as surgery to remove the thyroid gland, this can also cause hypothyroidism. Sometimes the thyroid gland is not functioning because of lack of stimulation from the pituitary gland.   Symptoms of hypothyroidism can include:  · Fatigue  · Trou provider know if you become pregnant because your dose of thyroid hormone will need to be adjusted. Follow-up care  See your healthcare provider for checkups as advised. You may need regular tests to check the level of thyroid hormone in your blood.   When for heat  · Irregular heartbeat  · Fatigue  · Unexplained weight loss  · More frequent bowel movements than usual  · Eye irritation or bulging eyes, which are symptoms of Graves disease, a common cause of hyperthyroidism  · Menstrual irregularity  · Enlarg include:  · Phenothiazines  · Phenytoin  · Dopamine  · Glucocorticoids  Other medicines that can affect thyroid tests include:  · Beta blockers  · Dexamethasone  · Enoxaparin  · Furosemide  · Heparin  · NSAIDs  · Salicylates  How do I get ready for this te

## 2019-05-31 ENCOUNTER — TELEPHONE (OUTPATIENT)
Dept: FAMILY MEDICINE CLINIC | Facility: CLINIC | Age: 54
End: 2019-05-31

## 2019-05-31 NOTE — TELEPHONE ENCOUNTER
Patient signed medical records authorization form for the below Facility to disclose health information to [facility]:      Facility / Provider Name: Dr. Altagracia Be Phone:   Facility Fax: [phone]    CARMELA sent to scanning.  Fax confirmation @ 3:09pm

## 2019-05-31 NOTE — TELEPHONE ENCOUNTER
Patient signed medical records authorization form for the below Facility to disclose health information to [facility]:      Facility / Provider Name: [facility] Phone:   Facility Fax: [phone]     CARMELA sent to scanning.  Fax confirmation @ 3:09

## 2019-06-11 ENCOUNTER — OFFICE VISIT (OUTPATIENT)
Dept: INTERNAL MEDICINE CLINIC | Facility: CLINIC | Age: 54
End: 2019-06-11
Payer: COMMERCIAL

## 2019-06-11 VITALS
RESPIRATION RATE: 16 BRPM | DIASTOLIC BLOOD PRESSURE: 78 MMHG | HEIGHT: 66 IN | SYSTOLIC BLOOD PRESSURE: 120 MMHG | WEIGHT: 231 LBS | BODY MASS INDEX: 37.12 KG/M2 | HEART RATE: 88 BPM

## 2019-06-11 DIAGNOSIS — E53.8 B12 DEFICIENCY: ICD-10-CM

## 2019-06-11 DIAGNOSIS — E03.9 HYPOTHYROIDISM, UNSPECIFIED TYPE: ICD-10-CM

## 2019-06-11 DIAGNOSIS — E66.01 MORBID OBESITY WITH BMI OF 40.0-44.9, ADULT (HCC): ICD-10-CM

## 2019-06-11 DIAGNOSIS — Z51.81 ENCOUNTER FOR THERAPEUTIC DRUG MONITORING: Primary | ICD-10-CM

## 2019-06-11 DIAGNOSIS — I10 ESSENTIAL HYPERTENSION: ICD-10-CM

## 2019-06-11 PROCEDURE — 93000 ELECTROCARDIOGRAM COMPLETE: CPT | Performed by: INTERNAL MEDICINE

## 2019-06-11 PROCEDURE — 99204 OFFICE O/P NEW MOD 45 MIN: CPT | Performed by: INTERNAL MEDICINE

## 2019-06-11 RX ORDER — TOPIRAMATE 25 MG/1
25 TABLET ORAL 2 TIMES DAILY
Qty: 60 TABLET | Refills: 1 | Status: SHIPPED | OUTPATIENT
Start: 2019-06-11 | End: 2019-07-11

## 2019-06-11 RX ORDER — PHENDIMETRAZINE TARTRATE 105 MG/1
1 CAPSULE, EXTENDED RELEASE ORAL EVERY MORNING
Qty: 30 CAPSULE | Refills: 1 | Status: SHIPPED | OUTPATIENT
Start: 2019-06-11 | End: 2019-08-22

## 2019-06-11 NOTE — PATIENT INSTRUCTIONS
Plan:  Continue with medications:   Go to the lab for blood work   Follow up with me in 1 month  Schedule follow up appointments: Bret Fernandez (dietitian) or Earline Mckeon (dietitian)   Check for insurance coverage for dietitian and labwork prior to sche -bean dip with vegetables    FRUIT  Low carb fruit options   Raspberries: Half a cup (60 grams) contains 3 grams of carbs. Blackberries: Half a cup (70 grams) contains 4 grams of carbs. Strawberries: Half a cup (100 grams) contains 6 grams of carbs.   Mak

## 2019-06-11 NOTE — PROGRESS NOTES
HISTORY OF PRESENT ILLNESS  Patient presents with:  Weight Problem: old Shelia Ventura pt, on phentemine now      Theodore Murillo is a 47year old female new to our office today for initiation of medical weight loss program.  Patient presents today with c/o excess Family or personal history of Pancreatic issues / Medullary Thyroid Cancer: negative    History of bariatric surgery: negative     [address] reviewed: obesity in parent/s or sibling: YES     REVIEW OF SYSTEMSGENERAL: feels well otherwise,   NECK: denies thickeni ALB 3.4 (L) 03/04/2017     (H) 03/04/2017    K 4.4 03/04/2017     03/04/2017    CO2 28.0 03/04/2017     Lab Results   Component Value Date     02/08/2018    A1C 5.3 02/08/2018     Lab Results   Component Value Date    CHOLEST 187 02/08/ -     topiramate 25 MG Oral Tab; Take 1 tablet (25 mg total) by mouth 2 (two) times daily. Refer to discharge instructions for dosing. PLAN  · Medication use and side effects reviewed with patient.   Medication contraindications: n/a   · Reviewed Juan Carlos Cohen 5. Reduce carbohydrates which includes sweets as well as rice, pasta, potatoes, bread, corn and instead choose whole grain options or more protein or vegetables. 6. Get a good night of sleep  7. Try to decrease stress in life    Please download apps:  1.

## 2019-06-25 ENCOUNTER — MED REC SCAN ONLY (OUTPATIENT)
Dept: FAMILY MEDICINE CLINIC | Facility: CLINIC | Age: 54
End: 2019-06-25

## 2019-07-06 ENCOUNTER — MED REC SCAN ONLY (OUTPATIENT)
Dept: FAMILY MEDICINE CLINIC | Facility: CLINIC | Age: 54
End: 2019-07-06

## 2019-07-31 ENCOUNTER — OFFICE VISIT (OUTPATIENT)
Dept: INTERNAL MEDICINE CLINIC | Facility: CLINIC | Age: 54
End: 2019-07-31
Payer: COMMERCIAL

## 2019-07-31 VITALS
BODY MASS INDEX: 38.09 KG/M2 | HEIGHT: 66 IN | HEART RATE: 80 BPM | WEIGHT: 237 LBS | DIASTOLIC BLOOD PRESSURE: 78 MMHG | SYSTOLIC BLOOD PRESSURE: 122 MMHG | RESPIRATION RATE: 16 BRPM

## 2019-07-31 DIAGNOSIS — Z51.81 ENCOUNTER FOR THERAPEUTIC DRUG MONITORING: Primary | ICD-10-CM

## 2019-07-31 DIAGNOSIS — E66.01 MORBID OBESITY WITH BMI OF 40.0-44.9, ADULT (HCC): ICD-10-CM

## 2019-07-31 DIAGNOSIS — I10 ESSENTIAL HYPERTENSION: ICD-10-CM

## 2019-07-31 PROCEDURE — 99214 OFFICE O/P EST MOD 30 MIN: CPT | Performed by: INTERNAL MEDICINE

## 2019-07-31 RX ORDER — PEN NEEDLE, DIABETIC 30 GX3/16"
1 NEEDLE, DISPOSABLE MISCELLANEOUS DAILY
Qty: 90 EACH | Refills: 0 | Status: SHIPPED | OUTPATIENT
Start: 2019-07-31 | End: 2020-01-02

## 2019-07-31 RX ORDER — TOPIRAMATE 25 MG/1
25 TABLET ORAL 2 TIMES DAILY
COMMUNITY
End: 2019-08-22

## 2019-07-31 NOTE — PROGRESS NOTES
Patient teaching on [facility] [address] performed. Patient demonstrated back, all questions were answered and patient verbalized understanding.  CRISTO

## 2019-07-31 NOTE — PROGRESS NOTES
HISTORY OF PRESENT ILLNESS  Patient presents with:  Weight Check: up 6 lb      Radha Conklin is a 47year old female here for follow up in medical weight loss program.     Denies chest pain, shortness of breath, dizziness, blurred vision, headache, pa 03/04/2017    ALT 26 03/04/2017    BILT 0.6 03/04/2017    TP 7.0 03/04/2017    ALB 3.4 (L) 03/04/2017     (H) 03/04/2017    K 4.4 03/04/2017     03/04/2017    CO2 28.0 03/04/2017     Lab Results   Component Value Date     02/08/2018    A daily for 7 days, THEN 2.4 mg daily for 7 days, THEN 3 mg daily.  -     Insulin Pen Needle (PEN NEEDLES) 32G X 4 MM Does not apply Misc; 1 each by Does not apply route daily.  -     Liraglutide -Weight Management (SAXENDA) 18 MG/3ML Subcutaneous Solution P

## 2019-08-22 ENCOUNTER — OFFICE VISIT (OUTPATIENT)
Dept: INTERNAL MEDICINE CLINIC | Facility: CLINIC | Age: 54
End: 2019-08-22
Payer: COMMERCIAL

## 2019-08-22 VITALS
HEIGHT: 66 IN | WEIGHT: 229 LBS | HEART RATE: 78 BPM | RESPIRATION RATE: 16 BRPM | DIASTOLIC BLOOD PRESSURE: 78 MMHG | BODY MASS INDEX: 36.8 KG/M2 | SYSTOLIC BLOOD PRESSURE: 122 MMHG

## 2019-08-22 DIAGNOSIS — I10 ESSENTIAL HYPERTENSION: ICD-10-CM

## 2019-08-22 DIAGNOSIS — Z51.81 ENCOUNTER FOR THERAPEUTIC DRUG MONITORING: Primary | ICD-10-CM

## 2019-08-22 DIAGNOSIS — E03.9 HYPOTHYROIDISM, UNSPECIFIED TYPE: ICD-10-CM

## 2019-08-22 DIAGNOSIS — E66.9 OBESITY (BMI 30-39.9): ICD-10-CM

## 2019-08-22 PROCEDURE — 99214 OFFICE O/P EST MOD 30 MIN: CPT | Performed by: INTERNAL MEDICINE

## 2019-08-22 RX ORDER — METFORMIN HYDROCHLORIDE 500 MG/1
TABLET, EXTENDED RELEASE ORAL
Qty: 30 TABLET | Refills: 2 | Status: SHIPPED | OUTPATIENT
Start: 2019-08-22 | End: 2019-09-23

## 2019-08-22 RX ORDER — LIRAGLUTIDE 6 MG/ML
3 INJECTION, SOLUTION SUBCUTANEOUS DAILY
Qty: 5 PEN | Refills: 2 | Status: SHIPPED | OUTPATIENT
Start: 2019-08-22 | End: 2019-11-18

## 2019-08-22 NOTE — PROGRESS NOTES
HISTORY OF PRESENT ILLNESS  Patient presents with:  Weight Check: down 8 lb      Morgan Gudino is a 47year old female here for follow up in medical weight loss program.     Denies chest pain, shortness of breath, dizziness, blurred vision, headache, BILT 0.6 03/04/2017    TP 7.0 03/04/2017    ALB 3.4 (L) 03/04/2017     (H) 03/04/2017    K 4.4 03/04/2017     03/04/2017    CO2 28.0 03/04/2017     Lab Results   Component Value Date     02/08/2018    A1C 5.3 02/08/2018     Lab Results in OV   · -reviewed surgery as alternative option as well. · Reviewed the role weight loss and bariatric surgery could play in long standing weight loss. · Continue blood pressure medications, goal 5 percent body weight loss.    · Nutrition: low carb di

## 2019-09-23 ENCOUNTER — OFFICE VISIT (OUTPATIENT)
Dept: INTERNAL MEDICINE CLINIC | Facility: CLINIC | Age: 54
End: 2019-09-23
Payer: COMMERCIAL

## 2019-09-23 VITALS
SYSTOLIC BLOOD PRESSURE: 122 MMHG | HEART RATE: 80 BPM | RESPIRATION RATE: 16 BRPM | HEIGHT: 66 IN | BODY MASS INDEX: 35.68 KG/M2 | WEIGHT: 222 LBS | DIASTOLIC BLOOD PRESSURE: 78 MMHG

## 2019-09-23 DIAGNOSIS — I10 ESSENTIAL HYPERTENSION: ICD-10-CM

## 2019-09-23 DIAGNOSIS — E03.9 HYPOTHYROIDISM, UNSPECIFIED TYPE: ICD-10-CM

## 2019-09-23 DIAGNOSIS — E66.9 OBESITY (BMI 30-39.9): ICD-10-CM

## 2019-09-23 DIAGNOSIS — Z51.81 ENCOUNTER FOR THERAPEUTIC DRUG MONITORING: Primary | ICD-10-CM

## 2019-09-23 PROCEDURE — 99214 OFFICE O/P EST MOD 30 MIN: CPT | Performed by: INTERNAL MEDICINE

## 2019-09-23 NOTE — PROGRESS NOTES
HISTORY OF PRESENT ILLNESS  Patient presents with:  Weight Check: down 7 lbs      Pierce Garcia is a 47year old female here for follow up in medical weight loss program.     Denies chest pain, shortness of breath, dizziness, blurred vision, headache, 3.4 (L) 03/04/2017     (H) 03/04/2017    K 4.4 03/04/2017     03/04/2017    CO2 28.0 03/04/2017     Lab Results   Component Value Date     02/08/2018    A1C 5.3 02/08/2018     Lab Results   Component Value Date    CHOLEST 187 02/08/2018 medications, goal 5 percent body weight loss. · Nutrition: low carb diet/ recommended to eat breakfast daily/ regular protein intake  · Medication use and side effects reviewed with patient.   Medication contraindications: n/a  · Follow up with dietitian

## 2019-09-28 NOTE — PATIENT INSTRUCTIONS
Thank you for choosing [facility]  To Do:  [address] [facility]  1. Follow up in 1 month  2. Restart metformin  3. Restart Phentermine  4. Have blood tests done after fasting  5.  Keep a good food diary          Weight Management: Getting Starte support. They may even want to join you. Also look to your healthcare provider, registered dietitian, and  for help. Your local hospital can give you more information about nutrition, exercise, and weight loss.   Date Last Reviewed: 1/31 chances are you won’t. Believe that you can stick to your plan and meet your goals:  · If you don’t meet a goal, don’t use it as an excuse to give up on your whole plan. Adjust your goal and try again.   · Try to understand your own attitude about food.  Ar worked and didn’t work last time and what you can try this time. · Choose changes that you are willing to stick with. · Work exercise into your weight-loss plan.   · Be realistic about what is possible. Your plan has to fit into your life in a balanced wa 36.9

## 2019-10-24 ENCOUNTER — OFFICE VISIT (OUTPATIENT)
Dept: INTERNAL MEDICINE CLINIC | Facility: CLINIC | Age: 54
End: 2019-10-24
Payer: COMMERCIAL

## 2019-10-24 VITALS
WEIGHT: 222 LBS | HEIGHT: 66 IN | RESPIRATION RATE: 16 BRPM | BODY MASS INDEX: 35.68 KG/M2 | DIASTOLIC BLOOD PRESSURE: 80 MMHG | HEART RATE: 78 BPM | SYSTOLIC BLOOD PRESSURE: 122 MMHG

## 2019-10-24 DIAGNOSIS — Z51.81 ENCOUNTER FOR THERAPEUTIC DRUG MONITORING: Primary | ICD-10-CM

## 2019-10-24 DIAGNOSIS — E66.9 OBESITY (BMI 30-39.9): ICD-10-CM

## 2019-10-24 PROCEDURE — 99213 OFFICE O/P EST LOW 20 MIN: CPT | Performed by: INTERNAL MEDICINE

## 2019-10-24 NOTE — PROGRESS NOTES
HISTORY OF PRESENT ILLNESS  Patient presents with:  Weight Check: same/saxenda 1.8 mg      Lexi Madsen is a 49year old female here for follow up in medical weight loss program.     Denies chest pain, shortness of breath, dizziness, blurred vision, Date     02/08/2018    A1C 5.3 02/08/2018     Lab Results   Component Value Date    CHOLEST 187 02/08/2018    TRIG 49 02/08/2018    HDL 70 02/08/2018     02/08/2018    VLDL 10 02/08/2018    TCHDLRATIO 2.67 02/08/2018    NONHDLC 117 02/08/2018 management. · Counseled on comprehensive weight loss plan including attention to nutrition, exercise and behavior/stress management for success. See patient instruction below for more details.   · Discussed strategies to overcome barriers to successful eladio

## 2019-11-18 ENCOUNTER — OFFICE VISIT (OUTPATIENT)
Dept: INTERNAL MEDICINE CLINIC | Facility: CLINIC | Age: 54
End: 2019-11-18
Payer: COMMERCIAL

## 2019-11-18 VITALS
SYSTOLIC BLOOD PRESSURE: 122 MMHG | WEIGHT: 219 LBS | HEART RATE: 78 BPM | RESPIRATION RATE: 16 BRPM | HEIGHT: 66 IN | BODY MASS INDEX: 35.2 KG/M2 | DIASTOLIC BLOOD PRESSURE: 76 MMHG

## 2019-11-18 DIAGNOSIS — Z51.81 ENCOUNTER FOR THERAPEUTIC DRUG MONITORING: Primary | ICD-10-CM

## 2019-11-18 DIAGNOSIS — E66.9 OBESITY (BMI 30-39.9): ICD-10-CM

## 2019-11-18 PROCEDURE — 99213 OFFICE O/P EST LOW 20 MIN: CPT | Performed by: INTERNAL MEDICINE

## 2019-11-18 RX ORDER — LIRAGLUTIDE 6 MG/ML
3 INJECTION, SOLUTION SUBCUTANEOUS DAILY
Qty: 5 PEN | Refills: 2 | Status: SHIPPED | OUTPATIENT
Start: 2019-11-18 | End: 2019-12-16

## 2019-12-16 ENCOUNTER — OFFICE VISIT (OUTPATIENT)
Dept: INTERNAL MEDICINE CLINIC | Facility: CLINIC | Age: 54
End: 2019-12-16
Payer: COMMERCIAL

## 2019-12-16 VITALS
SYSTOLIC BLOOD PRESSURE: 130 MMHG | BODY MASS INDEX: 35.84 KG/M2 | RESPIRATION RATE: 16 BRPM | HEIGHT: 66 IN | DIASTOLIC BLOOD PRESSURE: 80 MMHG | WEIGHT: 223 LBS | HEART RATE: 78 BPM

## 2019-12-16 DIAGNOSIS — I10 ESSENTIAL HYPERTENSION: ICD-10-CM

## 2019-12-16 DIAGNOSIS — R63.5 WEIGHT GAIN: ICD-10-CM

## 2019-12-16 DIAGNOSIS — E66.9 OBESITY (BMI 30-39.9): ICD-10-CM

## 2019-12-16 DIAGNOSIS — Z51.81 ENCOUNTER FOR THERAPEUTIC DRUG MONITORING: Primary | ICD-10-CM

## 2019-12-16 PROCEDURE — 99214 OFFICE O/P EST MOD 30 MIN: CPT | Performed by: INTERNAL MEDICINE

## 2019-12-16 RX ORDER — LIRAGLUTIDE 6 MG/ML
3 INJECTION, SOLUTION SUBCUTANEOUS DAILY
Qty: 5 PEN | Refills: 2 | Status: SHIPPED | OUTPATIENT
Start: 2019-12-16 | End: 2020-07-13

## 2019-12-16 NOTE — PROGRESS NOTES
HISTORY OF PRESENT ILLNESS  Patient presents with:  Weight Check: up 4 lb      Christen Amaral is a 49year old female here for follow up in medical weight loss program.     Denies chest pain, shortness of breath, dizziness, blurred vision, headache, pa ALT 26 03/04/2017    BILT 0.6 03/04/2017    TP 7.0 03/04/2017    ALB 3.4 (L) 03/04/2017     (H) 03/04/2017    K 4.4 03/04/2017     03/04/2017    CO2 28.0 03/04/2017     Lab Results   Component Value Date     02/08/2018    A1C 5.3 02/08/2 effects of this medication  · Reviewed the role weight loss plays with treatment of blood pressure. · Nutrition: low carb diet/ recommended to eat breakfast daily/ regular protein intake  · Medication use and side effects reviewed with patient.   Medicatio

## 2020-01-02 RX ORDER — PEN NEEDLE, DIABETIC 32GX 5/32"
NEEDLE, DISPOSABLE MISCELLANEOUS
Qty: 100 EACH | Refills: 1 | Status: SHIPPED | OUTPATIENT
Start: 2020-01-02 | End: 2020-07-13

## 2020-01-02 NOTE — TELEPHONE ENCOUNTER
Requesting BD pen needles  LOV: 12/16/19  RTC: 4 weeks  Last Relevant Labs: na  Filled: 7/31/19 #90 with 0 refills    Future Appointments   Date Time Provider Ben Fisher   1/20/2020  6:00 PM Harjeet Mccormack MD [facility]   2/24/2020  6:00 PM Sh

## 2020-01-20 ENCOUNTER — OFFICE VISIT (OUTPATIENT)
Dept: INTERNAL MEDICINE CLINIC | Facility: CLINIC | Age: 55
End: 2020-01-20
Payer: COMMERCIAL

## 2020-01-20 VITALS
RESPIRATION RATE: 14 BRPM | HEART RATE: 80 BPM | WEIGHT: 219 LBS | BODY MASS INDEX: 35.2 KG/M2 | SYSTOLIC BLOOD PRESSURE: 110 MMHG | DIASTOLIC BLOOD PRESSURE: 70 MMHG | HEIGHT: 66 IN

## 2020-01-20 DIAGNOSIS — G47.09 OTHER INSOMNIA: ICD-10-CM

## 2020-01-20 DIAGNOSIS — Z51.81 ENCOUNTER FOR THERAPEUTIC DRUG MONITORING: Primary | ICD-10-CM

## 2020-01-20 DIAGNOSIS — E03.9 HYPOTHYROIDISM, UNSPECIFIED TYPE: ICD-10-CM

## 2020-01-20 DIAGNOSIS — E66.9 OBESITY (BMI 30-39.9): ICD-10-CM

## 2020-01-20 PROCEDURE — 99214 OFFICE O/P EST MOD 30 MIN: CPT | Performed by: INTERNAL MEDICINE

## 2020-01-21 NOTE — PROGRESS NOTES
HISTORY OF PRESENT ILLNESS  Patient presents with:  Weight Check: down 4 lbs      Ronald Martin is a 49year old female here for follow up in medical weight loss program.     Denies chest pain, shortness of breath, dizziness, blurred vision, headache, ALKPHO 65 03/04/2017    AST 14 (L) 03/04/2017    ALT 26 03/04/2017    BILT 0.6 03/04/2017    TP 7.0 03/04/2017    ALB 3.4 (L) 03/04/2017     (H) 03/04/2017    K 4.4 03/04/2017     03/04/2017    CO2 28.0 03/04/2017     Lab Results   Component Va OV   · -reviewed surgery as alternative option as well current on back up option  · Reviewed the role weight loss and bariatric surgery could play in long standing weight loss, currently on hold  · Trial of add belviq 20 mg q day, yet to start   · -advised

## 2020-01-22 ENCOUNTER — OFFICE VISIT (OUTPATIENT)
Dept: FAMILY MEDICINE CLINIC | Facility: CLINIC | Age: 55
End: 2020-01-22
Payer: COMMERCIAL

## 2020-01-22 VITALS
HEART RATE: 82 BPM | OXYGEN SATURATION: 98 % | TEMPERATURE: 98 F | WEIGHT: 221 LBS | DIASTOLIC BLOOD PRESSURE: 72 MMHG | BODY MASS INDEX: 35.52 KG/M2 | RESPIRATION RATE: 20 BRPM | SYSTOLIC BLOOD PRESSURE: 118 MMHG | HEIGHT: 66 IN

## 2020-01-22 DIAGNOSIS — J20.9 ACUTE BRONCHITIS, UNSPECIFIED ORGANISM: Primary | ICD-10-CM

## 2020-01-22 DIAGNOSIS — J04.0 ACUTE LARYNGITIS: ICD-10-CM

## 2020-01-22 PROCEDURE — 99213 OFFICE O/P EST LOW 20 MIN: CPT | Performed by: NURSE PRACTITIONER

## 2020-01-22 RX ORDER — ALBUTEROL SULFATE 90 UG/1
2 AEROSOL, METERED RESPIRATORY (INHALATION) EVERY 6 HOURS PRN
Qty: 1 INHALER | Refills: 0 | Status: SHIPPED | OUTPATIENT
Start: 2020-01-22

## 2020-01-22 RX ORDER — IPRATROPIUM BROMIDE AND ALBUTEROL SULFATE 2.5; .5 MG/3ML; MG/3ML
3 SOLUTION RESPIRATORY (INHALATION) ONCE
Status: SHIPPED | OUTPATIENT
Start: 2020-01-22

## 2020-01-22 RX ORDER — PREDNISONE 20 MG/1
20 TABLET ORAL DAILY
Qty: 5 TABLET | Refills: 0 | Status: SHIPPED | OUTPATIENT
Start: 2020-01-22 | End: 2020-01-27

## 2020-01-22 RX ORDER — BENZONATATE 200 MG/1
200 CAPSULE ORAL 3 TIMES DAILY PRN
Qty: 30 CAPSULE | Refills: 0 | Status: SHIPPED | OUTPATIENT
Start: 2020-01-22 | End: 2020-03-04 | Stop reason: ALTCHOICE

## 2020-01-22 NOTE — PROGRESS NOTES
CHIEF COMPLAINT:   Patient presents with:  Nasal Congestion: Coughing up flem, post nasal drip, losing voice, headache, unable to sleep through the night due to cough, feelling tired, runny nose, X 4 days      HPI:   Gail Bedoya is a 49year old fem NEURO: Denies headaches    EXAM:   /72   Pulse 82   Temp 98 °F (36.7 °C) (Oral)   Resp 20   Ht 66\"   Wt 221 lb (100.2 kg)   LMP 11/28/2017   SpO2 98%   Breastfeeding No   BMI 35.67 kg/m²   GENERAL: well developed, well nourished,in no apparent distr Bronchitis is an infection of the air passages. It often occurs during a cold and is usually caused by a virus. Symptoms include cough with mucus (phlegm) and low-grade fever.  This illness is contagious during the first few days and is spread through the a · Over-the-counter cough, cold, and sore-throat medicines will not shorten the length of the illness, but they may be helpful to reduce symptoms.  (Note: Don't use decongestants if you have high blood pressure.)  · If you were given an inhaler, use it exact

## 2020-02-17 ENCOUNTER — TELEPHONE (OUTPATIENT)
Dept: INTERNAL MEDICINE CLINIC | Facility: CLINIC | Age: 55
End: 2020-02-17

## 2020-02-17 NOTE — TELEPHONE ENCOUNTER
Called patient in regards to FDA recommendations for belviq withdrawal from market.  [facility] [place]   PLAN  D/c belviq   Keep appt on 2/24 to discuss further options

## 2020-03-04 ENCOUNTER — HOSPITAL ENCOUNTER (EMERGENCY)
Age: 55
Discharge: HOME | End: 2020-03-04
Attending: EMERGENCY MEDICINE
Payer: COMMERCIAL

## 2020-03-04 ENCOUNTER — OFFICE VISIT (OUTPATIENT)
Dept: FAMILY MEDICINE CLINIC | Facility: CLINIC | Age: 55
End: 2020-03-04
Payer: COMMERCIAL

## 2020-03-04 ENCOUNTER — APPOINTMENT (OUTPATIENT)
Dept: CT IMAGING | Age: 55
End: 2020-03-04
Attending: PHYSICIAN ASSISTANT
Payer: COMMERCIAL

## 2020-03-04 VITALS
WEIGHT: 214 LBS | RESPIRATION RATE: 18 BRPM | HEIGHT: 67 IN | SYSTOLIC BLOOD PRESSURE: 114 MMHG | HEART RATE: 69 BPM | DIASTOLIC BLOOD PRESSURE: 73 MMHG | TEMPERATURE: 98 F | OXYGEN SATURATION: 100 % | BODY MASS INDEX: 33.59 KG/M2

## 2020-03-04 VITALS
WEIGHT: 219 LBS | TEMPERATURE: 98 F | DIASTOLIC BLOOD PRESSURE: 74 MMHG | SYSTOLIC BLOOD PRESSURE: 128 MMHG | HEIGHT: 66 IN | HEART RATE: 84 BPM | RESPIRATION RATE: 22 BRPM | BODY MASS INDEX: 35.2 KG/M2 | OXYGEN SATURATION: 98 %

## 2020-03-04 DIAGNOSIS — R10.84 GENERALIZED ABDOMINAL PAIN: ICD-10-CM

## 2020-03-04 DIAGNOSIS — R19.7 DIARRHEA, UNSPECIFIED TYPE: Primary | ICD-10-CM

## 2020-03-04 LAB
ALBUMIN SERPL-MCNC: 3.4 G/DL (ref 3.4–5)
ALBUMIN/GLOB SERPL: 0.8 {RATIO} (ref 1–2)
ALP LIVER SERPL-CCNC: 79 U/L (ref 41–108)
ALT SERPL-CCNC: 30 U/L (ref 13–56)
ANION GAP SERPL CALC-SCNC: 5 MMOL/L (ref 0–18)
AST SERPL-CCNC: 14 U/L (ref 15–37)
BASOPHILS # BLD AUTO: 0.04 X10(3) UL (ref 0–0.2)
BASOPHILS NFR BLD AUTO: 0.7 %
BILIRUB SERPL-MCNC: 0.3 MG/DL (ref 0.1–2)
BILIRUB UR QL STRIP.AUTO: NEGATIVE
BUN BLD-MCNC: 14 MG/DL (ref 7–18)
BUN/CREAT SERPL: 17.3 (ref 10–20)
CALCIUM BLD-MCNC: 9.1 MG/DL (ref 8.5–10.1)
CHLORIDE SERPL-SCNC: 109 MMOL/L (ref 98–112)
CO2 SERPL-SCNC: 27 MMOL/L (ref 21–32)
COLOR UR AUTO: YELLOW
CREAT BLD-MCNC: 0.81 MG/DL (ref 0.55–1.02)
DEPRECATED RDW RBC AUTO: 42.7 FL (ref 35.1–46.3)
EOSINOPHIL # BLD AUTO: 0.09 X10(3) UL (ref 0–0.7)
EOSINOPHIL NFR BLD AUTO: 1.5 %
ERYTHROCYTE [DISTWIDTH] IN BLOOD BY AUTOMATED COUNT: 13.1 % (ref 11–15)
GLOBULIN PLAS-MCNC: 4.2 G/DL (ref 2.8–4.4)
GLUCOSE BLD-MCNC: 93 MG/DL (ref 70–99)
GLUCOSE UR STRIP.AUTO-MCNC: NEGATIVE MG/DL
HCT VFR BLD AUTO: 42.5 % (ref 35–48)
HGB BLD-MCNC: 13.3 G/DL (ref 12–16)
IMM GRANULOCYTES # BLD AUTO: 0.02 X10(3) UL (ref 0–1)
IMM GRANULOCYTES NFR BLD: 0.3 %
KETONES UR STRIP.AUTO-MCNC: NEGATIVE MG/DL
LEUKOCYTE ESTERASE UR QL STRIP.AUTO: NEGATIVE
LIPASE SERPL-CCNC: 123 U/L (ref 73–393)
LYMPHOCYTES # BLD AUTO: 2.28 X10(3) UL (ref 1–4)
LYMPHOCYTES NFR BLD AUTO: 37.7 %
M PROTEIN MFR SERPL ELPH: 7.6 G/DL (ref 6.4–8.2)
MCH RBC QN AUTO: 28.2 PG (ref 26–34)
MCHC RBC AUTO-ENTMCNC: 31.3 G/DL (ref 31–37)
MCV RBC AUTO: 90.2 FL (ref 80–100)
MONOCYTES # BLD AUTO: 0.4 X10(3) UL (ref 0.1–1)
MONOCYTES NFR BLD AUTO: 6.6 %
NEUTROPHILS # BLD AUTO: 3.21 X10 (3) UL (ref 1.5–7.7)
NEUTROPHILS # BLD AUTO: 3.21 X10(3) UL (ref 1.5–7.7)
NEUTROPHILS NFR BLD AUTO: 53.2 %
NITRITE UR QL STRIP.AUTO: NEGATIVE
OSMOLALITY SERPL CALC.SUM OF ELEC: 292 MOSM/KG (ref 275–295)
PH UR STRIP.AUTO: 5 [PH] (ref 4.5–8)
PLATELET # BLD AUTO: 288 10(3)UL (ref 150–450)
POTASSIUM SERPL-SCNC: 4.2 MMOL/L (ref 3.5–5.1)
PROT UR STRIP.AUTO-MCNC: NEGATIVE MG/DL
RBC # BLD AUTO: 4.71 X10(6)UL (ref 3.8–5.3)
RBC UR QL AUTO: NEGATIVE
SODIUM SERPL-SCNC: 141 MMOL/L (ref 136–145)
SP GR UR STRIP.AUTO: 1.02 (ref 1–1.03)
UROBILINOGEN UR STRIP.AUTO-MCNC: 0.2 MG/DL
WBC # BLD AUTO: 6 X10(3) UL (ref 4–11)

## 2020-03-04 PROCEDURE — 83690 ASSAY OF LIPASE: CPT | Performed by: PHYSICIAN ASSISTANT

## 2020-03-04 PROCEDURE — 99284 EMERGENCY DEPT VISIT MOD MDM: CPT

## 2020-03-04 PROCEDURE — 85025 COMPLETE CBC W/AUTO DIFF WBC: CPT | Performed by: PHYSICIAN ASSISTANT

## 2020-03-04 PROCEDURE — 81003 URINALYSIS AUTO W/O SCOPE: CPT | Performed by: PHYSICIAN ASSISTANT

## 2020-03-04 PROCEDURE — 80053 COMPREHEN METABOLIC PANEL: CPT | Performed by: PHYSICIAN ASSISTANT

## 2020-03-04 PROCEDURE — 74177 CT ABD & PELVIS W/CONTRAST: CPT | Performed by: PHYSICIAN ASSISTANT

## 2020-03-04 PROCEDURE — 96360 HYDRATION IV INFUSION INIT: CPT

## 2020-03-04 RX ORDER — METFORMIN HYDROCHLORIDE 500 MG/1
TABLET, EXTENDED RELEASE ORAL
COMMUNITY
Start: 2020-02-22 | End: 2020-07-13

## 2020-03-04 RX ORDER — METRONIDAZOLE 7.5 MG/G
GEL VAGINAL
COMMUNITY
Start: 2020-02-22 | End: 2020-03-04 | Stop reason: ALTCHOICE

## 2020-03-04 NOTE — PROGRESS NOTES
CHIEF COMPLAINT:     Patient presents with:  Diarrhea: Massive stomach pain, headache, right before going to bathroom having metal taste in mouth, nausea, X Started last night      HPI:   Phillip Ashton is a 49year old female who presents with complai EYES: Denies blurred vision or double vision  HENT: Denies congestion, rhinorrhea, sore throat or ear pain  CHEST: Denies chest pain, or palpitations  LUNGS: Denies shortness of breath, cough, or wheezing  GI: See HPI  MUSCULOSKELETAL: no arthralgia or swo Discussed with patient that her symptoms were likely viral and advised supportive care with close follow up with PCP. Patient is concerned with the pain she is having. Discussed further evaluation at [facility] today. [facility] CT down. Triage advised ER.         AS

## 2020-03-04 NOTE — ED NOTES
Assumed care of patient at this time, report rcvd by VLADIMIR Jain. Rounded on patient, patient in stable condition.

## 2020-03-04 NOTE — ED PROVIDER NOTES
Patient Seen in: [facility] [place]      History   Patient presents with:  Abdomen/Flank Pain    Stated Complaint: ABD PAIN SINCE LAST NOC. REPORTS DIARRHEA. HPI    Pleasant 81-year-old female.   Medical history of thyroid dys SpO2 100%   BMI 33.52 kg/m²         Physical Exam    Gen: Well appearing, well groomed, alert and aware x 3  Neck: Supple, full range of motion, no thyromegaly or lymphadenopathy.   Eye examination: EOMs are intact, normal conjunctival  ENT: No injection no 3/4/2020  PROCEDURE:  CT ABDOMEN PELVIS IV CONTRAST, NO ORAL (ER)  COMPARISON:  None. INDICATIONS:  ABD PAIN SINCE LAST NOC. REPORTS DIARRHEA.   TECHNIQUE:  CT scanning was performed from the dome of the diaphragm to the pubic symphysis with non-ionic int PM     Finalized by: Selina Grant MD on 3/04/2020 at 2:43 PM          CT result as above. Patient remains nontoxic and well-appearing in room. Normal vital signs. Likely viral etiology. We discussed low fiber diet and Imodium. Push clear fluids. English

## 2020-03-10 NOTE — TELEPHONE ENCOUNTER
Tried calling patient to verify she got the message.   Call #3  Future Appointments   Date Time Provider Ben Fisher   3/23/2020  6:00 PM Latonya Tobin MD [address]

## 2020-03-19 ENCOUNTER — TELEPHONE (OUTPATIENT)
Dept: INTERNAL MEDICINE CLINIC | Facility: CLINIC | Age: 55
End: 2020-03-19

## 2020-03-19 DIAGNOSIS — Z51.81 ENCOUNTER FOR THERAPEUTIC DRUG MONITORING: Primary | ICD-10-CM

## 2020-03-19 DIAGNOSIS — R63.2 BINGE EATING: ICD-10-CM

## 2020-03-19 DIAGNOSIS — E66.9 OBESITY (BMI 30-39.9): ICD-10-CM

## 2020-03-23 PROCEDURE — 99441 PHONE E/M BY PHYS 5-10 MIN: CPT | Performed by: INTERNAL MEDICINE

## 2020-03-23 NOTE — TELEPHONE ENCOUNTER
[facility] Weight Management check in/follow up encounter:      Virtual/Telephone Check-In    Sully Bundy verbally consents to a Virtual/Telephone Check-In service on 03/23/20.   Patient understands and accepts financial responsibility for any d

## 2020-03-24 ENCOUNTER — TELEPHONE (OUTPATIENT)
Dept: INTERNAL MEDICINE CLINIC | Facility: CLINIC | Age: 55
End: 2020-03-24

## 2020-03-30 ENCOUNTER — PATIENT MESSAGE (OUTPATIENT)
Dept: INTERNAL MEDICINE CLINIC | Facility: CLINIC | Age: 55
End: 2020-03-30

## 2020-03-31 NOTE — TELEPHONE ENCOUNTER
From: Asmita Ledezma  To: Ivory Argueta MD  Sent: 3/30/2020 7:14 PM CDT  Subject: Prescription Mearl Friday Dr Temi Vilchis,    Could you please contact [facility] to start my medicine.  [facility] would not release it to me since it stated on the script do

## 2020-05-19 ENCOUNTER — TELEPHONE (OUTPATIENT)
Dept: INTERNAL MEDICINE CLINIC | Facility: CLINIC | Age: 55
End: 2020-05-19

## 2020-05-19 NOTE — TELEPHONE ENCOUNTER
Patient called the clinic and stated she was told she had an appointment last week with Dr. Edin Miranda and never got a call? I do not know who she spoke with. I see no appointment scheduled for last week.    She said this happened previously and she was told sh

## 2020-05-22 ENCOUNTER — VIRTUAL PHONE E/M (OUTPATIENT)
Dept: INTERNAL MEDICINE CLINIC | Facility: CLINIC | Age: 55
End: 2020-05-22
Payer: COMMERCIAL

## 2020-05-22 VITALS — WEIGHT: 223 LBS | BODY MASS INDEX: 35 KG/M2

## 2020-05-22 DIAGNOSIS — I10 ESSENTIAL HYPERTENSION: ICD-10-CM

## 2020-05-22 DIAGNOSIS — E66.9 OBESITY (BMI 30-39.9): ICD-10-CM

## 2020-05-22 DIAGNOSIS — E03.9 HYPOTHYROIDISM, UNSPECIFIED TYPE: ICD-10-CM

## 2020-05-22 DIAGNOSIS — Z51.81 ENCOUNTER FOR THERAPEUTIC DRUG MONITORING: Primary | ICD-10-CM

## 2020-05-22 DIAGNOSIS — R63.2 BINGE EATING: ICD-10-CM

## 2020-05-22 DIAGNOSIS — E53.8 B12 DEFICIENCY: ICD-10-CM

## 2020-05-22 PROCEDURE — 99213 OFFICE O/P EST LOW 20 MIN: CPT | Performed by: NURSE PRACTITIONER

## 2020-05-22 NOTE — PROGRESS NOTES
Virtual Telephone Check-In    Esther Nguyen verbally consents to a Virtual/Telephone Check-In visit on 5/22/2020. Patient understands and accepts financial responsibility for any deductible, co-insurance and/or co-pays associated with this service. ROS  General: feeling well, denies fatigue  CV: denies cp, palpitations  Resp: denies sob  Neuro: denies paresthesia or cognitive changes  Psych: denies any mood changes     Physical Exam:  Appropriate affect and mood, normal logic and thought process effort was taken to allow for sufficient and adequate time. This billing was spent on reviewing labs, medications, radiology tests and decision making. Appropriate medical decision-making and tests are ordered as detailed in the plan of care above.      A

## 2020-05-22 NOTE — PATIENT INSTRUCTIONS
We are here to support you with weight loss, but please remember that you still need your primary care provider for your routine health maintenance.       PLAN:  Will increase vyvanse 40mg   Follow up 1 month  Schedule follow up appointments: Tim Arzolad ** Daily INPUT> Look at nutrition section-- \"nutrients\" and it will break down your macros for the day (ie. Protein, carbs, fibers, sugars and fats). Try to stay within these numbers daily     2.  \"7 minute workout\" to help with exercise/a

## 2020-05-26 ENCOUNTER — TELEPHONE (OUTPATIENT)
Dept: INTERNAL MEDICINE CLINIC | Facility: CLINIC | Age: 55
End: 2020-05-26

## 2020-05-26 DIAGNOSIS — E66.9 OBESITY (BMI 30-39.9): ICD-10-CM

## 2020-05-26 DIAGNOSIS — Z51.81 ENCOUNTER FOR THERAPEUTIC DRUG MONITORING: Primary | ICD-10-CM

## 2020-05-26 NOTE — TELEPHONE ENCOUNTER
Off Saxenda for 3 weeks, then restarted today at 11 am at the 1.8 mg. Started vomiting 20 minutes later. No fever, abdominal pain, diarrhea or constipation. No longer vomiting up food, just dry heaves now. No other changes to medication or new foods.  No re

## 2020-06-24 ENCOUNTER — TELEPHONE (OUTPATIENT)
Dept: INTERNAL MEDICINE CLINIC | Facility: CLINIC | Age: 55
End: 2020-06-24

## 2020-06-24 NOTE — TELEPHONE ENCOUNTER
Pt called would like to discuss going back on phentermine pt currently on vyvanse- pt states she is not having as much success on this med

## 2020-07-01 NOTE — TELEPHONE ENCOUNTER
Patient is now scheduled.     Future Appointments   Date Time Provider Ben Fisher   7/13/2020  1:40 PM Cornell Deluna PA-C [facility]

## 2020-07-13 ENCOUNTER — OFFICE VISIT (OUTPATIENT)
Dept: INTERNAL MEDICINE CLINIC | Facility: CLINIC | Age: 55
End: 2020-07-13
Payer: COMMERCIAL

## 2020-07-13 VITALS
DIASTOLIC BLOOD PRESSURE: 85 MMHG | WEIGHT: 229 LBS | RESPIRATION RATE: 16 BRPM | BODY MASS INDEX: 37.69 KG/M2 | TEMPERATURE: 99 F | HEIGHT: 65.5 IN | SYSTOLIC BLOOD PRESSURE: 126 MMHG | HEART RATE: 72 BPM

## 2020-07-13 DIAGNOSIS — R63.5 WEIGHT GAIN: ICD-10-CM

## 2020-07-13 DIAGNOSIS — E66.9 OBESITY (BMI 30-39.9): ICD-10-CM

## 2020-07-13 DIAGNOSIS — I10 ESSENTIAL HYPERTENSION: ICD-10-CM

## 2020-07-13 DIAGNOSIS — Z51.81 ENCOUNTER FOR THERAPEUTIC DRUG MONITORING: Primary | ICD-10-CM

## 2020-07-13 DIAGNOSIS — R63.2 BINGE EATING: ICD-10-CM

## 2020-07-13 PROCEDURE — 99214 OFFICE O/P EST MOD 30 MIN: CPT | Performed by: PHYSICIAN ASSISTANT

## 2020-07-13 RX ORDER — TOBRAMYCIN AND DEXAMETHASONE 3; 1 MG/ML; MG/ML
SUSPENSION/ DROPS OPHTHALMIC
COMMUNITY
Start: 2020-05-20 | End: 2020-07-13

## 2020-07-13 NOTE — PATIENT INSTRUCTIONS
Restart Saxenda, 0.6 mg subcutaneously daily x 2 weeks, then increase to 1.2mg subcutaneously daily    Download carb manager tomás    Intermittent Fasting Options:    Time restricted eating: Eat only in an 8 hour window, fast for 16 hours consecutively of th

## 2020-07-13 NOTE — PROGRESS NOTES
HISTORY OF PRESENT ILLNESS  Patient presents with:  Weight Check: 6 pound weight gain      Mike Pierson is a 49year old female here for follow up in medical weight loss program.   Pt is on vyvanse 40mg  Is not doing any food tracking  Home scale 223 RBC 4.71 03/04/2020    HGB 13.3 03/04/2020    HCT 42.5 03/04/2020    MCV 90.2 03/04/2020    MCH 28.2 03/04/2020    MCHC 31.3 03/04/2020    RDW 13.1 03/04/2020    .0 03/04/2020     Lab Results   Component Value Date    GLU 93 03/04/2020    BUN 14 03/ APRN        ASSESSMENT  Analyzed weight data:       Diagnoses and all orders for this visit:    Encounter for therapeutic drug monitoring    Obesity (BMI 30-39. 9)    Binge eating    Essential hypertension    Weight gain    Other orders  -     Cancel: Hayley hours consecutively of the day. Drinking water during the fasting time is okay. I recommend the tomás Zero- Fasting Tracker to help support and provide accountability.     Feast/Fast: Alternate between fast day (500 calories and 55 grams or protein) with feas

## 2020-07-22 ENCOUNTER — OFFICE VISIT (OUTPATIENT)
Dept: FAMILY MEDICINE CLINIC | Facility: CLINIC | Age: 55
End: 2020-07-22
Payer: COMMERCIAL

## 2020-07-22 VITALS
HEIGHT: 66 IN | TEMPERATURE: 98 F | RESPIRATION RATE: 16 BRPM | OXYGEN SATURATION: 99 % | WEIGHT: 227 LBS | BODY MASS INDEX: 36.48 KG/M2 | SYSTOLIC BLOOD PRESSURE: 126 MMHG | DIASTOLIC BLOOD PRESSURE: 72 MMHG | HEART RATE: 69 BPM

## 2020-07-22 DIAGNOSIS — E55.9 VITAMIN D DEFICIENCY: ICD-10-CM

## 2020-07-22 DIAGNOSIS — Z00.00 LABORATORY EXAM ORDERED AS PART OF ROUTINE GENERAL MEDICAL EXAMINATION: ICD-10-CM

## 2020-07-22 DIAGNOSIS — E53.8 B12 DEFICIENCY: ICD-10-CM

## 2020-07-22 DIAGNOSIS — R20.2 PARESTHESIAS: Primary | ICD-10-CM

## 2020-07-22 DIAGNOSIS — E03.9 HYPOTHYROIDISM, UNSPECIFIED TYPE: ICD-10-CM

## 2020-07-22 PROBLEM — J06.9 ACUTE URI: Status: RESOLVED | Noted: 2018-09-21 | Resolved: 2020-07-22

## 2020-07-22 PROCEDURE — 3078F DIAST BP <80 MM HG: CPT | Performed by: NURSE PRACTITIONER

## 2020-07-22 PROCEDURE — 99214 OFFICE O/P EST MOD 30 MIN: CPT | Performed by: NURSE PRACTITIONER

## 2020-07-22 PROCEDURE — 3074F SYST BP LT 130 MM HG: CPT | Performed by: NURSE PRACTITIONER

## 2020-07-22 PROCEDURE — 3008F BODY MASS INDEX DOCD: CPT | Performed by: NURSE PRACTITIONER

## 2020-07-22 NOTE — PATIENT INSTRUCTIONS
Paraesthesias  Paraesthesia is a burning or prickling sensation that is sometimes felt in the hands, arms, legs or feet. It can also occur in other parts of the body. It can also feel like tingling or numbness, skin crawling, or itching.  The feeling is n Call your healthcare provider right away if any of the following occur:  · Numbness or weakness of the face, one arm, or one leg  · Slurred speech, confusion, trouble speaking, walking, or seeing  · Severe headache, fainting spell, dizziness, or seizure  · · Fatigue  · Weakness  · Weight loss  · Tingling or numbness of the hands and feet  · Constipation  · Poor balance  · Confusion  · Depression  · Memory loss  · Soreness of the mouth or tongue  What other tests might I have along with this test?  Your healt Having a blood test with a needle carries some risks. These include bleeding, infection, bruising, and feeling lightheaded. When the needle pricks your arm or hand, you may feel a slight sting or pain.  Afterward, the site may be sore.   What might affect m

## 2020-07-22 NOTE — PROGRESS NOTES
Chief Complaint:   Patient presents with:  Leg Pain: bilateral     HPI:   This is a 49year old female presenting for evaluation of mid back and bilateral leg pain x 3 years.  Reports feeling burning and a tingling sensation in her mid back, just under her 30 tablet 0   • Blood Pressure Monitoring Does not apply Kit CHECK BLOOD PRESSURE ONCE A DAY 1 kit 0   • ergocalciferol 14283 units Oral Cap Take 1 capsule by mouth once a week.         Counseling given: Not Answered       REVIEW OF SYSTEMS:   Review of Sys Bowel sounds are normal. She exhibits no distension. There is no tenderness. Musculoskeletal: Normal range of motion. She exhibits no tenderness. Thoracic back: She exhibits normal range of motion and no tenderness.    Neurological: She is alert and or

## 2020-07-24 ENCOUNTER — LABORATORY ENCOUNTER (OUTPATIENT)
Dept: LAB | Age: 55
End: 2020-07-24
Attending: NURSE PRACTITIONER
Payer: COMMERCIAL

## 2020-07-24 DIAGNOSIS — Z51.81 ENCOUNTER FOR THERAPEUTIC DRUG MONITORING: ICD-10-CM

## 2020-07-24 DIAGNOSIS — I10 ESSENTIAL HYPERTENSION: ICD-10-CM

## 2020-07-24 DIAGNOSIS — E66.01 MORBID OBESITY WITH BMI OF 40.0-44.9, ADULT (HCC): ICD-10-CM

## 2020-07-24 DIAGNOSIS — Z00.00 LABORATORY EXAM ORDERED AS PART OF ROUTINE GENERAL MEDICAL EXAMINATION: ICD-10-CM

## 2020-07-24 DIAGNOSIS — E53.8 B12 DEFICIENCY: ICD-10-CM

## 2020-07-24 DIAGNOSIS — E03.9 HYPOTHYROIDISM, UNSPECIFIED TYPE: ICD-10-CM

## 2020-07-24 DIAGNOSIS — R20.2 PARESTHESIAS: ICD-10-CM

## 2020-07-24 DIAGNOSIS — E55.9 VITAMIN D DEFICIENCY: ICD-10-CM

## 2020-07-24 LAB
ALBUMIN SERPL-MCNC: 3.8 G/DL (ref 3.4–5)
ALBUMIN/GLOB SERPL: 0.9 {RATIO} (ref 1–2)
ALP LIVER SERPL-CCNC: 80 U/L (ref 41–108)
ALT SERPL-CCNC: 28 U/L (ref 13–56)
ANION GAP SERPL CALC-SCNC: 2 MMOL/L (ref 0–18)
AST SERPL-CCNC: 17 U/L (ref 15–37)
BASOPHILS # BLD AUTO: 0.04 X10(3) UL (ref 0–0.2)
BASOPHILS NFR BLD AUTO: 0.5 %
BILIRUB SERPL-MCNC: 0.7 MG/DL (ref 0.1–2)
BUN BLD-MCNC: 18 MG/DL (ref 7–18)
BUN/CREAT SERPL: 18.4 (ref 10–20)
CALCIUM BLD-MCNC: 9.8 MG/DL (ref 8.5–10.1)
CHLORIDE SERPL-SCNC: 108 MMOL/L (ref 98–112)
CHOLEST SMN-MCNC: 214 MG/DL (ref ?–200)
CO2 SERPL-SCNC: 29 MMOL/L (ref 21–32)
CREAT BLD-MCNC: 0.98 MG/DL (ref 0.55–1.02)
DEPRECATED RDW RBC AUTO: 41.4 FL (ref 35.1–46.3)
EOSINOPHIL # BLD AUTO: 0.07 X10(3) UL (ref 0–0.7)
EOSINOPHIL NFR BLD AUTO: 0.9 %
ERYTHROCYTE [DISTWIDTH] IN BLOOD BY AUTOMATED COUNT: 12.5 % (ref 11–15)
EST. AVERAGE GLUCOSE BLD GHB EST-MCNC: 114 MG/DL (ref 68–126)
FOLATE SERPL-MCNC: 14 NG/ML (ref 8.7–?)
GLOBULIN PLAS-MCNC: 4.1 G/DL (ref 2.8–4.4)
GLUCOSE BLD-MCNC: 85 MG/DL (ref 70–99)
HBA1C MFR BLD HPLC: 5.6 % (ref ?–5.7)
HCT VFR BLD AUTO: 44 % (ref 35–48)
HDLC SERPL-MCNC: 58 MG/DL (ref 40–59)
HGB BLD-MCNC: 13.8 G/DL (ref 12–16)
IMM GRANULOCYTES # BLD AUTO: 0.03 X10(3) UL (ref 0–1)
IMM GRANULOCYTES NFR BLD: 0.4 %
LDLC SERPL CALC-MCNC: 139 MG/DL (ref ?–100)
LYMPHOCYTES # BLD AUTO: 2.46 X10(3) UL (ref 1–4)
LYMPHOCYTES NFR BLD AUTO: 32.4 %
M PROTEIN MFR SERPL ELPH: 7.9 G/DL (ref 6.4–8.2)
MCH RBC QN AUTO: 28.6 PG (ref 26–34)
MCHC RBC AUTO-ENTMCNC: 31.4 G/DL (ref 31–37)
MCV RBC AUTO: 91.1 FL (ref 80–100)
MONOCYTES # BLD AUTO: 0.47 X10(3) UL (ref 0.1–1)
MONOCYTES NFR BLD AUTO: 6.2 %
NEUTROPHILS # BLD AUTO: 4.52 X10 (3) UL (ref 1.5–7.7)
NEUTROPHILS # BLD AUTO: 4.52 X10(3) UL (ref 1.5–7.7)
NEUTROPHILS NFR BLD AUTO: 59.6 %
NONHDLC SERPL-MCNC: 156 MG/DL (ref ?–130)
OSMOLALITY SERPL CALC.SUM OF ELEC: 289 MOSM/KG (ref 275–295)
PATIENT FASTING Y/N/NP: YES
PATIENT FASTING Y/N/NP: YES
PLATELET # BLD AUTO: 305 10(3)UL (ref 150–450)
POTASSIUM SERPL-SCNC: 3.7 MMOL/L (ref 3.5–5.1)
RBC # BLD AUTO: 4.83 X10(6)UL (ref 3.8–5.3)
SODIUM SERPL-SCNC: 139 MMOL/L (ref 136–145)
TRIGL SERPL-MCNC: 83 MG/DL (ref 30–149)
TSI SER-ACNC: 2.27 MIU/ML (ref 0.36–3.74)
VIT B12 SERPL-MCNC: 424 PG/ML (ref 193–986)
VIT D+METAB SERPL-MCNC: 42.9 NG/ML (ref 30–100)
VLDLC SERPL CALC-MCNC: 17 MG/DL (ref 0–30)
WBC # BLD AUTO: 7.6 X10(3) UL (ref 4–11)

## 2020-07-24 PROCEDURE — 82746 ASSAY OF FOLIC ACID SERUM: CPT | Performed by: NURSE PRACTITIONER

## 2020-07-24 PROCEDURE — 82607 VITAMIN B-12: CPT | Performed by: NURSE PRACTITIONER

## 2020-07-24 PROCEDURE — 80061 LIPID PANEL: CPT | Performed by: NURSE PRACTITIONER

## 2020-07-24 PROCEDURE — 82306 VITAMIN D 25 HYDROXY: CPT | Performed by: NURSE PRACTITIONER

## 2020-07-24 PROCEDURE — 36415 COLL VENOUS BLD VENIPUNCTURE: CPT | Performed by: INTERNAL MEDICINE

## 2020-07-24 PROCEDURE — 80050 GENERAL HEALTH PANEL: CPT | Performed by: NURSE PRACTITIONER

## 2020-07-24 PROCEDURE — 83036 HEMOGLOBIN GLYCOSYLATED A1C: CPT | Performed by: INTERNAL MEDICINE

## 2020-07-27 ENCOUNTER — TELEPHONE (OUTPATIENT)
Dept: FAMILY MEDICINE CLINIC | Facility: CLINIC | Age: 55
End: 2020-07-27

## 2020-07-27 DIAGNOSIS — E53.8 B12 DEFICIENCY: Primary | ICD-10-CM

## 2020-07-27 DIAGNOSIS — R20.2 PARESTHESIA OF BOTH LOWER EXTREMITIES: ICD-10-CM

## 2020-07-27 NOTE — TELEPHONE ENCOUNTER
----- Message from LEIDY Pizano FNP-C sent at 7/27/2020  8:01 AM CDT -----  Borderline cholesterol. Needs low fat, low carb diet. Increase intake of fibrous foods and lean proteins. Needs aerobic exercise, at least 30 min 3 days weekly as tolerated.

## 2020-07-27 NOTE — TELEPHONE ENCOUNTER
LVM for pt regarding results and instructions listed below. Pt instructed to call back with any further questions/concerns. DSI MET-TECHt msg sent.

## 2020-09-15 ENCOUNTER — TELEPHONE (OUTPATIENT)
Dept: INTERNAL MEDICINE CLINIC | Facility: CLINIC | Age: 55
End: 2020-09-15

## 2020-09-24 ENCOUNTER — VIRTUAL PHONE E/M (OUTPATIENT)
Dept: INTERNAL MEDICINE CLINIC | Facility: CLINIC | Age: 55
End: 2020-09-24
Payer: COMMERCIAL

## 2020-09-24 DIAGNOSIS — R63.2 BINGE EATING: ICD-10-CM

## 2020-09-24 DIAGNOSIS — E66.9 OBESITY (BMI 30-39.9): ICD-10-CM

## 2020-09-24 DIAGNOSIS — I10 ESSENTIAL HYPERTENSION: ICD-10-CM

## 2020-09-24 DIAGNOSIS — Z51.81 ENCOUNTER FOR THERAPEUTIC DRUG MONITORING: Primary | ICD-10-CM

## 2020-09-24 PROCEDURE — 99213 OFFICE O/P EST LOW 20 MIN: CPT | Performed by: INTERNAL MEDICINE

## 2020-09-24 RX ORDER — PHENTERMINE HYDROCHLORIDE 37.5 MG/1
37.5 TABLET ORAL
Qty: 30 TABLET | Refills: 0 | Status: SHIPPED | OUTPATIENT
Start: 2020-09-24 | End: 2020-11-19

## 2020-09-24 NOTE — PROGRESS NOTES
[facility] Weight Management check in/follow up encounter  Virtual/Telephone Check-In    Bennie Cm verbally consents to a Virtual/Telephone Check-In service on 09/24/20  Has been consistent with medication   Restarted injection and had a reac visitation. There are limitations of this visit as no physical exam could be performed. Every conscious effort was taken to allow for sufficient and adequate time.   This billing was spent on reviewing labs, medications, radiology tests and decision makin

## 2020-11-16 ENCOUNTER — TELEPHONE (OUTPATIENT)
Dept: INTERNAL MEDICINE CLINIC | Facility: CLINIC | Age: 55
End: 2020-11-16

## 2020-11-19 DIAGNOSIS — E03.9 HYPOTHYROIDISM, UNSPECIFIED TYPE: ICD-10-CM

## 2020-11-19 DIAGNOSIS — Z51.81 ENCOUNTER FOR THERAPEUTIC DRUG MONITORING: Primary | ICD-10-CM

## 2020-11-19 DIAGNOSIS — E66.9 OBESITY (BMI 30-39.9): ICD-10-CM

## 2020-11-19 RX ORDER — LEVOTHYROXINE SODIUM 88 UG/1
88 TABLET ORAL
Qty: 90 TABLET | Refills: 0 | Status: SHIPPED | OUTPATIENT
Start: 2020-11-19

## 2020-11-19 RX ORDER — PHENTERMINE HYDROCHLORIDE 37.5 MG/1
37.5 TABLET ORAL
Qty: 30 TABLET | Refills: 0 | Status: SHIPPED | OUTPATIENT
Start: 2020-11-19 | End: 2020-12-30

## 2020-11-19 NOTE — TELEPHONE ENCOUNTER
Requesting:   Requested Prescriptions     Pending Prescriptions Disp Refills   • Phentermine HCl 37.5 MG Oral Tab 30 tablet 0     Sig: Take 1 tablet (37.5 mg total) by mouth every morning before breakfast.       LOV: 9/24/20 VTE  RTC: 4 weeks    Filled: 9/

## 2020-11-19 NOTE — TELEPHONE ENCOUNTER
Pt called to request refill of rx phentermine and rx synthroid    Name of Medication:       Phentermine HCl 37.5 MG Oral Tab     Synthroid     Dose:     How is medication prescribed:    Specific [id number]

## 2020-11-24 ENCOUNTER — HOSPITAL ENCOUNTER (OUTPATIENT)
Dept: MRI IMAGING | Age: 55
Discharge: HOME | End: 2020-11-24
Attending: OBSTETRICS & GYNECOLOGY
Payer: COMMERCIAL

## 2020-11-24 DIAGNOSIS — M54.50 LOWER BACK PAIN: ICD-10-CM

## 2020-12-30 ENCOUNTER — OFFICE VISIT (OUTPATIENT)
Dept: INTERNAL MEDICINE CLINIC | Facility: CLINIC | Age: 55
End: 2020-12-30
Payer: COMMERCIAL

## 2020-12-30 VITALS
WEIGHT: 236 LBS | HEART RATE: 76 BPM | RESPIRATION RATE: 16 BRPM | SYSTOLIC BLOOD PRESSURE: 120 MMHG | HEIGHT: 66 IN | DIASTOLIC BLOOD PRESSURE: 84 MMHG | BODY MASS INDEX: 37.93 KG/M2

## 2020-12-30 DIAGNOSIS — R63.5 WEIGHT GAIN: ICD-10-CM

## 2020-12-30 DIAGNOSIS — E53.8 B12 DEFICIENCY: ICD-10-CM

## 2020-12-30 DIAGNOSIS — I10 ESSENTIAL HYPERTENSION: ICD-10-CM

## 2020-12-30 DIAGNOSIS — E03.9 HYPOTHYROIDISM, UNSPECIFIED TYPE: ICD-10-CM

## 2020-12-30 DIAGNOSIS — Z51.81 ENCOUNTER FOR THERAPEUTIC DRUG MONITORING: Primary | ICD-10-CM

## 2020-12-30 DIAGNOSIS — E66.9 OBESITY (BMI 30-39.9): ICD-10-CM

## 2020-12-30 DIAGNOSIS — R63.2 BINGE EATING: ICD-10-CM

## 2020-12-30 PROCEDURE — 99214 OFFICE O/P EST MOD 30 MIN: CPT | Performed by: NURSE PRACTITIONER

## 2020-12-30 PROCEDURE — 3008F BODY MASS INDEX DOCD: CPT | Performed by: NURSE PRACTITIONER

## 2020-12-30 PROCEDURE — 3074F SYST BP LT 130 MM HG: CPT | Performed by: NURSE PRACTITIONER

## 2020-12-30 PROCEDURE — 3079F DIAST BP 80-89 MM HG: CPT | Performed by: NURSE PRACTITIONER

## 2020-12-30 RX ORDER — NALTREXONE HYDROCHLORIDE AND BUPROPION HYDROCHLORIDE 8; 90 MG/1; MG/1
TABLET, EXTENDED RELEASE ORAL
Qty: 120 TABLET | Refills: 0 | Status: SHIPPED | OUTPATIENT
Start: 2020-12-30 | End: 2021-03-01

## 2020-12-30 RX ORDER — PHENTERMINE HYDROCHLORIDE 37.5 MG/1
37.5 TABLET ORAL
Qty: 30 TABLET | Refills: 0 | Status: CANCELLED | OUTPATIENT
Start: 2020-12-30

## 2020-12-30 NOTE — PROGRESS NOTES
HISTORY OF PRESENT ILLNESS  Patient presents with:  Weight Check: up 7 lbs    Shanita Leon is a 48year old female here for follow up with medical weight loss program for the treatment of overweight, obesity, or morbid obesity.      Up #7 lbs  Is curr GENERAL: well developed, well nourished, in no apparent distress, A/O x3  SKIN: no rashes, no suspicious lesions  HEENT: conjunctiva pink, sclera non icteric, PERRLA  NECK: supple, no adenopathy  LUNGS: CTA in all fields, breathing non labored  CARDIO: RRR •  ipratropium-albuterol (DUONEB) nebulizer solution 3 mL, 3 mL, Nebulization, Once, ARAM Soares        ASSESSMENT/PLAN  (Z51.81) Encounter for therapeutic drug monitoring  (primary encounter diagnosis)  Plan: Naltrexone-buPROPion HCl ER (Semaj Martines) Patient Instructions   We are here to support you with weight loss, but please remember that you still need your primary care provider for your routine health maintenance.       PLAN:  Contrave, take as directed  Stop taking phentermine   Schedule follow up ** Daily INPUT> Look at nutrition section-- \"nutrients\" and it will break down your macros for the day (ie. Protein, carbs, fibers, sugars and fats). Try to stay within these numbers daily     2.  \"7 minute workout\" to help with exercise/a

## 2020-12-30 NOTE — PATIENT INSTRUCTIONS
We are here to support you with weight loss, but please remember that you still need your primary care provider for your routine health maintenance.       PLAN:  Contrave, take as directed  Stop taking phentermine   Schedule follow up appointments: Paulie Penny ** Daily INPUT> Look at nutrition section-- \"nutrients\" and it will break down your macros for the day (ie. Protein, carbs, fibers, sugars and fats). Try to stay within these numbers daily     2.  \"7 minute workout\" to help with exercise/a

## 2021-01-15 ENCOUNTER — TELEPHONE (OUTPATIENT)
Dept: INTERNAL MEDICINE CLINIC | Facility: CLINIC | Age: 56
End: 2021-01-15

## 2021-01-15 DIAGNOSIS — R63.2 BINGE EATING: ICD-10-CM

## 2021-01-15 DIAGNOSIS — E66.9 OBESITY (BMI 30-39.9): ICD-10-CM

## 2021-01-15 DIAGNOSIS — I10 ESSENTIAL HYPERTENSION: Primary | ICD-10-CM

## 2021-01-15 NOTE — TELEPHONE ENCOUNTER
Jenni Lindquist RN Hi Amy,     Can you please enter a Dietitian order for DX E66.9 & I10 as patient is seeing [facility] [address] [facility] on Monday.       done

## 2021-01-31 RX ORDER — PHENTERMINE HYDROCHLORIDE 37.5 MG/1
TABLET ORAL
Qty: 30 TABLET | Refills: 0 | OUTPATIENT
Start: 2021-01-31

## 2021-01-31 NOTE — TELEPHONE ENCOUNTER
Requesting Phentermine 37.5 mg  LOV: 12/30/20  RTC: one month  Last Relevant Labs: na  Filled: 11/19/20 #30 with 0 refills    No future appointments. Denied refill as it was stopped d/t complaint of high bp from patient.

## 2021-02-15 ENCOUNTER — TELEPHONE (OUTPATIENT)
Dept: INTERNAL MEDICINE CLINIC | Facility: CLINIC | Age: 56
End: 2021-02-15

## 2021-03-01 ENCOUNTER — OFFICE VISIT (OUTPATIENT)
Dept: INTERNAL MEDICINE CLINIC | Facility: CLINIC | Age: 56
End: 2021-03-01
Payer: COMMERCIAL

## 2021-03-01 VITALS
DIASTOLIC BLOOD PRESSURE: 80 MMHG | BODY MASS INDEX: 38.52 KG/M2 | HEIGHT: 65.5 IN | RESPIRATION RATE: 16 BRPM | WEIGHT: 234 LBS | SYSTOLIC BLOOD PRESSURE: 132 MMHG | HEART RATE: 88 BPM

## 2021-03-01 DIAGNOSIS — Z51.81 ENCOUNTER FOR THERAPEUTIC DRUG MONITORING: Primary | ICD-10-CM

## 2021-03-01 DIAGNOSIS — E66.9 OBESITY (BMI 30-39.9): ICD-10-CM

## 2021-03-01 DIAGNOSIS — R63.2 BINGE EATING: ICD-10-CM

## 2021-03-01 PROCEDURE — 3079F DIAST BP 80-89 MM HG: CPT | Performed by: PHYSICIAN ASSISTANT

## 2021-03-01 PROCEDURE — 3075F SYST BP GE 130 - 139MM HG: CPT | Performed by: PHYSICIAN ASSISTANT

## 2021-03-01 PROCEDURE — 99213 OFFICE O/P EST LOW 20 MIN: CPT | Performed by: PHYSICIAN ASSISTANT

## 2021-03-01 PROCEDURE — 3008F BODY MASS INDEX DOCD: CPT | Performed by: PHYSICIAN ASSISTANT

## 2021-03-01 RX ORDER — PEN NEEDLE, DIABETIC 30 GX3/16"
1 NEEDLE, DISPOSABLE MISCELLANEOUS DAILY
Qty: 90 EACH | Refills: 0 | Status: SHIPPED | OUTPATIENT
Start: 2021-03-01 | End: 2021-05-30

## 2021-03-01 RX ORDER — LIRAGLUTIDE 6 MG/ML
INJECTION, SOLUTION SUBCUTANEOUS
Qty: 1 PEN | Refills: 1 | Status: SHIPPED | OUTPATIENT
Start: 2021-03-01 | End: 2021-04-05

## 2021-03-01 RX ORDER — PHENTERMINE HYDROCHLORIDE 37.5 MG/1
37.5 TABLET ORAL
Qty: 30 TABLET | Refills: 0 | Status: SHIPPED | OUTPATIENT
Start: 2021-03-01 | End: 2021-04-05

## 2021-03-01 NOTE — PROGRESS NOTES
HISTORY OF PRESENT ILLNESS  Patient presents with:  Weight Check: lost 2 pounds       Chacha Connor is a 48year old female here for follow up in medical weight loss program.   Down 2lbs  Compliant on phentermine  Denies chest pain, shortness of breat without murmur  EXTREMITIES: no cyanosis, no clubbing, no edema    Lab Results   Component Value Date    WBC 7.6 07/24/2020    RBC 4.83 07/24/2020    HGB 13.8 07/24/2020    HCT 44.0 07/24/2020    MCV 91.1 07/24/2020    MCH 28.6 07/24/2020    MCHC 31.4 07/2 Nebulization, Once, ARAM Dickens        ASSESSMENT  Analyzed weight data:       Diagnoses and all orders for this visit:    Encounter for therapeutic drug monitoring    Obesity (BMI 30-39. 9)    Binge eating    Other orders  -     Phentermine HCl 37. Lonne Hodgkins, PA-C  3/1/2021

## 2021-03-02 ENCOUNTER — TELEPHONE (OUTPATIENT)
Dept: INTERNAL MEDICINE CLINIC | Facility: CLINIC | Age: 56
End: 2021-03-02

## 2021-03-02 NOTE — TELEPHONE ENCOUNTER
Request from [place] to complete PA for [address]  ID [id number]    Morbid obesity with BMI of 40.0-44.9, adult [facility]) (Resolved)     11/28/2017       Details  Code: E66.01 ...         I called the # and get recording follow prompts and I am dis

## 2021-03-03 NOTE — TELEPHONE ENCOUNTER
Form received for Saxenda PA   18 mg/3ml to take 3 mg daily 15 ml for 30 days  1. BMI is 38.35  2. No  3. No  4. N/A has not started med yet  5. No  6. Yes  7. No  8. Yes  9. Yes and no  10. No  11. No  12. Yes  13.   No and this is because [address] [place] [address]

## 2021-03-12 NOTE — TELEPHONE ENCOUNTER
Patient never read my chart regarding Ozempic. I called to discuss and got voicemail. I asked her to read my chart and respond or call back.

## 2021-03-16 NOTE — TELEPHONE ENCOUNTER
I called and left another message regarding the denial of Saxenda and the new med we want to try. I asked her to call back to say okay or send my chart.

## 2021-03-18 NOTE — TELEPHONE ENCOUNTER
I have sent my chart and called 2 times and patient will not respond. Encounter routed to Nellie Nissen to notify.

## 2021-04-05 ENCOUNTER — OFFICE VISIT (OUTPATIENT)
Dept: INTERNAL MEDICINE CLINIC | Facility: CLINIC | Age: 56
End: 2021-04-05
Payer: COMMERCIAL

## 2021-04-05 VITALS
DIASTOLIC BLOOD PRESSURE: 84 MMHG | BODY MASS INDEX: 38.35 KG/M2 | HEART RATE: 82 BPM | HEIGHT: 65.5 IN | WEIGHT: 233 LBS | RESPIRATION RATE: 16 BRPM | SYSTOLIC BLOOD PRESSURE: 120 MMHG

## 2021-04-05 DIAGNOSIS — R63.2 BINGE EATING: ICD-10-CM

## 2021-04-05 DIAGNOSIS — E66.9 CLASS 2 OBESITY WITHOUT SERIOUS COMORBIDITY WITH BODY MASS INDEX (BMI) OF 38.0 TO 38.9 IN ADULT, UNSPECIFIED OBESITY TYPE: ICD-10-CM

## 2021-04-05 DIAGNOSIS — R73.01 IFG (IMPAIRED FASTING GLUCOSE): ICD-10-CM

## 2021-04-05 DIAGNOSIS — E78.2 MIXED HYPERLIPIDEMIA: ICD-10-CM

## 2021-04-05 DIAGNOSIS — Z51.81 ENCOUNTER FOR THERAPEUTIC DRUG MONITORING: Primary | ICD-10-CM

## 2021-04-05 DIAGNOSIS — I10 ESSENTIAL HYPERTENSION: ICD-10-CM

## 2021-04-05 PROCEDURE — 3074F SYST BP LT 130 MM HG: CPT | Performed by: PHYSICIAN ASSISTANT

## 2021-04-05 PROCEDURE — 3008F BODY MASS INDEX DOCD: CPT | Performed by: PHYSICIAN ASSISTANT

## 2021-04-05 PROCEDURE — 99214 OFFICE O/P EST MOD 30 MIN: CPT | Performed by: PHYSICIAN ASSISTANT

## 2021-04-05 PROCEDURE — 3079F DIAST BP 80-89 MM HG: CPT | Performed by: PHYSICIAN ASSISTANT

## 2021-04-05 RX ORDER — PHENTERMINE HYDROCHLORIDE 37.5 MG/1
37.5 TABLET ORAL
Qty: 30 TABLET | Refills: 0 | Status: SHIPPED | OUTPATIENT
Start: 2021-04-05 | End: 2021-05-18

## 2021-04-05 NOTE — PROGRESS NOTES
HISTORY OF PRESENT ILLNESS  Patient presents with:  Weight Check: lost 1 pound      Theodore Murillo is a 48year old female here for follow up in medical weight loss program.   Down 1lb  Denies chest pain, shortness of breath, dizziness, blurred vision, 07/24/2020    BUN 18 07/24/2020    BUNCREA 18.4 07/24/2020    CREATSERUM 0.98 07/24/2020    ANIONGAP 2 07/24/2020    GFR 90 03/04/2017    GFRNAA 66 07/24/2020    GFRAA 76 07/24/2020    CA 9.8 07/24/2020    OSMOCALC 289 07/24/2020    ALKPHO 80 07/24/2020 obesity without serious comorbidity with body mass index (BMI) of 38.0 to 38.9 in adult, unspecified obesity type    Binge eating    Essential hypertension    IFG (impaired fasting glucose)    Mixed hyperlipidemia    Other orders  -     Phentermine HCl 37. Patient Instructions on file for this visit. No follow-ups on file. Patient verbalizes understanding.     Shauna Davis PA-C  4/5/2021

## 2021-05-17 NOTE — TELEPHONE ENCOUNTER
Requesting Phentermine 37.5 mg  LOV: 4/5/21  RTC: one month  Last Relevant Labs: na  Filled: 4/5/21 #30 with 0 refills   last filled 3[place]1/21 [place]    Future Appointments   Date Time Provider Ben Fisher   5/20/2021  8:00 AM Aleksandar Hwang

## 2021-05-17 NOTE — TELEPHONE ENCOUNTER
Phentermine HCl 37.5 MG Oral Tab   ozempic   Dose:     How is medication prescribed:    Specific name of pharmacy and location: [facility] [place]ej[place], Arian Gomez [address] [facility] 10, [phone], [phone]    Name

## 2021-05-18 RX ORDER — PHENTERMINE HYDROCHLORIDE 37.5 MG/1
37.5 TABLET ORAL
Qty: 30 TABLET | Refills: 0 | Status: SHIPPED | OUTPATIENT
Start: 2021-05-18 | End: 2021-05-20

## 2021-05-18 NOTE — TELEPHONE ENCOUNTER
I tried calling patient to verify if she ever obtain the RX written 4/5/21 and she did not answer. She would be due even if she did get that. Will you refill before the visit?

## 2021-05-20 ENCOUNTER — OFFICE VISIT (OUTPATIENT)
Dept: INTERNAL MEDICINE CLINIC | Facility: CLINIC | Age: 56
End: 2021-05-20
Payer: COMMERCIAL

## 2021-05-20 VITALS
TEMPERATURE: 97 F | HEIGHT: 65.5 IN | RESPIRATION RATE: 16 BRPM | BODY MASS INDEX: 36.71 KG/M2 | WEIGHT: 223 LBS | HEART RATE: 76 BPM | SYSTOLIC BLOOD PRESSURE: 122 MMHG | OXYGEN SATURATION: 98 % | DIASTOLIC BLOOD PRESSURE: 74 MMHG

## 2021-05-20 DIAGNOSIS — R73.01 IFG (IMPAIRED FASTING GLUCOSE): ICD-10-CM

## 2021-05-20 DIAGNOSIS — E78.2 MIXED HYPERLIPIDEMIA: ICD-10-CM

## 2021-05-20 DIAGNOSIS — Z51.81 ENCOUNTER FOR THERAPEUTIC DRUG MONITORING: Primary | ICD-10-CM

## 2021-05-20 DIAGNOSIS — E66.09 CLASS 2 OBESITY DUE TO EXCESS CALORIES WITHOUT SERIOUS COMORBIDITY WITH BODY MASS INDEX (BMI) OF 36.0 TO 36.9 IN ADULT: ICD-10-CM

## 2021-05-20 DIAGNOSIS — I10 ESSENTIAL HYPERTENSION: ICD-10-CM

## 2021-05-20 PROCEDURE — 3074F SYST BP LT 130 MM HG: CPT | Performed by: PHYSICIAN ASSISTANT

## 2021-05-20 PROCEDURE — 99214 OFFICE O/P EST MOD 30 MIN: CPT | Performed by: PHYSICIAN ASSISTANT

## 2021-05-20 PROCEDURE — 3078F DIAST BP <80 MM HG: CPT | Performed by: PHYSICIAN ASSISTANT

## 2021-05-20 PROCEDURE — 3008F BODY MASS INDEX DOCD: CPT | Performed by: PHYSICIAN ASSISTANT

## 2021-05-20 RX ORDER — SEMAGLUTIDE 1.34 MG/ML
0.5 INJECTION, SOLUTION SUBCUTANEOUS
Qty: 1 PEN | Refills: 1 | Status: SHIPPED | OUTPATIENT
Start: 2021-05-20

## 2021-05-20 RX ORDER — PHENTERMINE HYDROCHLORIDE 37.5 MG/1
37.5 TABLET ORAL
Qty: 30 TABLET | Refills: 0 | Status: SHIPPED | OUTPATIENT
Start: 2021-05-20 | End: 2021-06-28

## 2021-05-20 NOTE — PROGRESS NOTES
HISTORY OF PRESENT ILLNESS  Patient presents with:  Weight Check: down 10 pounds      Pierce Jose is a 48year old female here for follow up in medical weight loss program.   Down 10lbs  Compliant on phentermine and ozempic  Denies chest pain, short 18.4 07/24/2020    CREATSERUM 0.98 07/24/2020    ANIONGAP 2 07/24/2020    GFR 90 03/04/2017    GFRNAA 66 07/24/2020    GFRAA 76 07/24/2020    CA 9.8 07/24/2020    OSMOCALC 289 07/24/2020    ALKPHO 80 07/24/2020    AST 17 07/24/2020    ALT 28 07/24/2020 comorbidity with body mass index (BMI) of 36.0 to 36.9 in adult    IFG (impaired fasting glucose)    Essential hypertension    Mixed hyperlipidemia    Other orders  -     Phentermine HCl 37.5 MG Oral Tab;  Take 1 tablet (37.5 mg total) by mouth every mornin patient instruction below for more details.   · Discussed strategies to overcome barriers to successful weight loss and weight maintenance  · FITTE: ACSM recommendations (150-300 minutes/ week in active weight loss)   · Weight Loss consent to treat reviewed

## 2021-06-28 ENCOUNTER — OFFICE VISIT (OUTPATIENT)
Dept: INTERNAL MEDICINE CLINIC | Facility: CLINIC | Age: 56
End: 2021-06-28
Payer: COMMERCIAL

## 2021-06-28 VITALS
HEART RATE: 76 BPM | WEIGHT: 221 LBS | BODY MASS INDEX: 36.38 KG/M2 | SYSTOLIC BLOOD PRESSURE: 128 MMHG | RESPIRATION RATE: 16 BRPM | HEIGHT: 65.5 IN | DIASTOLIC BLOOD PRESSURE: 82 MMHG

## 2021-06-28 DIAGNOSIS — Z51.81 ENCOUNTER FOR THERAPEUTIC DRUG MONITORING: Primary | ICD-10-CM

## 2021-06-28 DIAGNOSIS — I10 ESSENTIAL HYPERTENSION: ICD-10-CM

## 2021-06-28 DIAGNOSIS — R73.01 IFG (IMPAIRED FASTING GLUCOSE): ICD-10-CM

## 2021-06-28 DIAGNOSIS — E66.01 CLASS 2 SEVERE OBESITY WITH SERIOUS COMORBIDITY AND BODY MASS INDEX (BMI) OF 36.0 TO 36.9 IN ADULT, UNSPECIFIED OBESITY TYPE (HCC): ICD-10-CM

## 2021-06-28 DIAGNOSIS — E78.2 MIXED HYPERLIPIDEMIA: ICD-10-CM

## 2021-06-28 PROCEDURE — 3074F SYST BP LT 130 MM HG: CPT | Performed by: PHYSICIAN ASSISTANT

## 2021-06-28 PROCEDURE — 99214 OFFICE O/P EST MOD 30 MIN: CPT | Performed by: PHYSICIAN ASSISTANT

## 2021-06-28 PROCEDURE — 3008F BODY MASS INDEX DOCD: CPT | Performed by: PHYSICIAN ASSISTANT

## 2021-06-28 PROCEDURE — 3079F DIAST BP 80-89 MM HG: CPT | Performed by: PHYSICIAN ASSISTANT

## 2021-06-28 RX ORDER — SEMAGLUTIDE 1.34 MG/ML
0.5 INJECTION, SOLUTION SUBCUTANEOUS
Qty: 1 EACH | Refills: 0 | Status: SHIPPED | OUTPATIENT
Start: 2021-06-28 | End: 2021-07-20

## 2021-06-28 RX ORDER — SEMAGLUTIDE 1.34 MG/ML
0.5 INJECTION, SOLUTION SUBCUTANEOUS
Refills: 0 | Status: CANCELLED | OUTPATIENT
Start: 2021-06-28

## 2021-06-28 RX ORDER — PHENTERMINE HYDROCHLORIDE 37.5 MG/1
37.5 TABLET ORAL
Qty: 30 TABLET | Refills: 0 | Status: SHIPPED | OUTPATIENT
Start: 2021-06-28 | End: 2021-07-28

## 2021-06-28 NOTE — PROGRESS NOTES
HISTORY OF PRESENT ILLNESS  Patient presents with:  Weight Check: lost 2 pounds      Kathie Bran is a 48year old female here for follow up in medical weight loss program.   Lost 2lbs  Prior to wedding was 217lbs  Compliant on ozempic and phentermin MCV 91.1 07/24/2020    MCH 28.6 07/24/2020    MCHC 31.4 07/24/2020    RDW 12.5 07/24/2020    .0 07/24/2020     Lab Results   Component Value Date    GLU 85 07/24/2020    BUN 18 07/24/2020    BUNCREA 18.4 07/24/2020    CREATSERUM 0.98 07/24/2020 APRN        ASSESSMENT  Analyzed weight data:       Diagnoses and all orders for this visit:    Encounter for therapeutic drug monitoring    Class 2 severe obesity with serious comorbidity and body mass index (BMI) of 36.0 to 36.9 in adult, unspecified obe recommendations (150-300 minutes/ week in active weight loss)   · Weight Loss consent to treat reviewed and signed     Total time spent on chart review, pre-charting, obtaining history, counseling, and educating, reviewing labs was 35 minutes.       There a

## 2021-07-20 ENCOUNTER — OFFICE VISIT (OUTPATIENT)
Dept: FAMILY MEDICINE CLINIC | Facility: CLINIC | Age: 56
End: 2021-07-20
Payer: COMMERCIAL

## 2021-07-20 VITALS
BODY MASS INDEX: 36.02 KG/M2 | WEIGHT: 218.81 LBS | DIASTOLIC BLOOD PRESSURE: 84 MMHG | HEIGHT: 65.5 IN | HEART RATE: 72 BPM | SYSTOLIC BLOOD PRESSURE: 118 MMHG | RESPIRATION RATE: 16 BRPM | TEMPERATURE: 97 F

## 2021-07-20 DIAGNOSIS — Z12.31 ENCOUNTER FOR SCREENING MAMMOGRAM FOR MALIGNANT NEOPLASM OF BREAST: ICD-10-CM

## 2021-07-20 DIAGNOSIS — Z00.00 LABORATORY EXAM ORDERED AS PART OF ROUTINE GENERAL MEDICAL EXAMINATION: ICD-10-CM

## 2021-07-20 DIAGNOSIS — I83.813 VARICOSE VEINS OF BOTH LOWER EXTREMITIES WITH PAIN: Primary | ICD-10-CM

## 2021-07-20 PROCEDURE — 99214 OFFICE O/P EST MOD 30 MIN: CPT | Performed by: NURSE PRACTITIONER

## 2021-07-20 PROCEDURE — 3079F DIAST BP 80-89 MM HG: CPT | Performed by: NURSE PRACTITIONER

## 2021-07-20 PROCEDURE — 3008F BODY MASS INDEX DOCD: CPT | Performed by: NURSE PRACTITIONER

## 2021-07-20 PROCEDURE — 3074F SYST BP LT 130 MM HG: CPT | Performed by: NURSE PRACTITIONER

## 2021-07-20 NOTE — PATIENT INSTRUCTIONS
Varicose Veins  Varicose veins are swollen, enlarged veins most often found in the legs. They are usually blue or purple in color and may bulge, twist, and stand out under the skin. Normally, veins return blood from the body to the heart.  The leg veins also help improve blood flow. · If you are overweight, talk with your healthcare provider about setting up a weight-loss plan. Maintaining a healthy weight can help reduce the strain on your veins.  It may also improve symptoms, such as swelling and aching may have noticed tiny red or blue bursts (spider veins). Or maybe you have veins that bulge or look twisted (varicose veins). If so, there are treatments that can help. What can be done?   Spider and varicose veins can affect the way you feel about yoursel your veins. To stay at a healthy weight, try these tips:  · Choose lean meats, fish, and skinless chicken. · Use low-fat dairy products. · Eat foods high in fiber, such as whole grains, fruits, and vegetables.   · Cut down on sugar, salt, and saturated an

## 2021-07-20 NOTE — PROGRESS NOTES
Chief Complaint:   Patient presents with:  Leg Pain: C/o left leg pain x1 year. Pain rated 8/10. HPI:   This is a 48year old female presenting for evaluation of n/t of bilateral lower extremities, L>R. Symptoms are somewhat more noticeable at night.  [facility] tightness, shortness of breath and wheezing. Cardiovascular: Negative for chest pain, palpitations and leg swelling. Musculoskeletal:        +Leg pain. Skin: Negative for pallor, rash and wound.    Neurological: Negative for dizziness, weakness, ligh part of routine general medical examination  - COMP METABOLIC PANEL (14)  - LIPID PANEL  - TSH W REFLEX TO FREE T4    3.  Encounter for screening mammogram for malignant neoplasm of breast  - [facility] CARI 2D+3D SCREENING BILAT (CPT=77067/08491)    Duration of v

## 2021-07-28 ENCOUNTER — OFFICE VISIT (OUTPATIENT)
Dept: INTERNAL MEDICINE CLINIC | Facility: CLINIC | Age: 56
End: 2021-07-28
Payer: COMMERCIAL

## 2021-07-28 VITALS
HEIGHT: 65.5 IN | BODY MASS INDEX: 35.72 KG/M2 | RESPIRATION RATE: 16 BRPM | HEART RATE: 70 BPM | WEIGHT: 217 LBS | SYSTOLIC BLOOD PRESSURE: 120 MMHG | DIASTOLIC BLOOD PRESSURE: 84 MMHG

## 2021-07-28 DIAGNOSIS — E78.2 MIXED HYPERLIPIDEMIA: ICD-10-CM

## 2021-07-28 DIAGNOSIS — R63.2 BINGE EATING: ICD-10-CM

## 2021-07-28 DIAGNOSIS — E66.01 CLASS 2 SEVERE OBESITY WITH SERIOUS COMORBIDITY AND BODY MASS INDEX (BMI) OF 36.0 TO 36.9 IN ADULT, UNSPECIFIED OBESITY TYPE (HCC): ICD-10-CM

## 2021-07-28 DIAGNOSIS — Z51.81 ENCOUNTER FOR THERAPEUTIC DRUG MONITORING: Primary | ICD-10-CM

## 2021-07-28 DIAGNOSIS — R73.01 IFG (IMPAIRED FASTING GLUCOSE): ICD-10-CM

## 2021-07-28 DIAGNOSIS — I10 ESSENTIAL HYPERTENSION: ICD-10-CM

## 2021-07-28 PROCEDURE — 3008F BODY MASS INDEX DOCD: CPT | Performed by: PHYSICIAN ASSISTANT

## 2021-07-28 PROCEDURE — 99214 OFFICE O/P EST MOD 30 MIN: CPT | Performed by: PHYSICIAN ASSISTANT

## 2021-07-28 PROCEDURE — 3079F DIAST BP 80-89 MM HG: CPT | Performed by: PHYSICIAN ASSISTANT

## 2021-07-28 PROCEDURE — 3074F SYST BP LT 130 MM HG: CPT | Performed by: PHYSICIAN ASSISTANT

## 2021-07-28 RX ORDER — SEMAGLUTIDE 1.34 MG/ML
0.5 INJECTION, SOLUTION SUBCUTANEOUS
Qty: 1 EACH | Refills: 1 | Status: CANCELLED | OUTPATIENT
Start: 2021-07-28

## 2021-07-28 RX ORDER — PHENTERMINE HYDROCHLORIDE 37.5 MG/1
37.5 TABLET ORAL
Qty: 30 TABLET | Refills: 0 | Status: SHIPPED | OUTPATIENT
Start: 2021-07-28

## 2021-07-28 NOTE — PROGRESS NOTES
HISTORY OF PRESENT ILLNESS  Patient presents with:  Weight Check: down 4 lbs       Jasmeet Craig is a 48year old female here for follow up in medical weight loss program.   Down 4lbs  Compliant on phentermine, ozempic  Denies chest pain, shortness of 07/24/2020    RBC 4.83 07/24/2020    HGB 13.8 07/24/2020    HCT 44.0 07/24/2020    MCV 91.1 07/24/2020    MCH 28.6 07/24/2020    MCHC 31.4 07/24/2020    RDW 12.5 07/24/2020    .0 07/24/2020     Lab Results   Component Value Date    GLU 85 07/24/2020 nebulizer solution 3 mL, 3 mL, Nebulization, Once, ARAM Russell        ASSESSMENT  Analyzed weight data:       Diagnoses and all orders for this visit:    Encounter for therapeutic drug monitoring    Class 2 severe obesity with serious comorbidity a exercise and behavior/stress management for success. See patient instruction below for more details.   · Discussed strategies to overcome barriers to successful weight loss and weight maintenance  · FITTE: ACSM recommendations (150-300 minutes/ week in acti

## 2021-11-04 RX ORDER — SEMAGLUTIDE 1.34 MG/ML
0.5 INJECTION, SOLUTION SUBCUTANEOUS
Refills: 0 | Status: CANCELLED | OUTPATIENT
Start: 2021-11-04

## 2021-11-04 RX ORDER — PHENTERMINE HYDROCHLORIDE 37.5 MG/1
37.5 TABLET ORAL
Qty: 30 TABLET | Refills: 0 | Status: SHIPPED | OUTPATIENT
Start: 2021-11-04 | End: 2021-12-30

## 2021-11-04 RX ORDER — SEMAGLUTIDE 1.34 MG/ML
0.25 INJECTION, SOLUTION SUBCUTANEOUS WEEKLY
Qty: 1.5 ML | Refills: 0 | Status: SHIPPED | OUTPATIENT
Start: 2021-11-04 | End: 2021-12-30

## 2021-11-04 RX ORDER — PHENTERMINE HYDROCHLORIDE 37.5 MG/1
37.5 TABLET ORAL
Qty: 30 TABLET | Refills: 0 | Status: CANCELLED | OUTPATIENT
Start: 2021-11-04

## 2021-11-04 NOTE — TELEPHONE ENCOUNTER
Pt called to follow up of refill request     Pt does not have enough meds until her next tomás    Has questions about changing does to         Name of Medication: phentermine and ozempic     Dose:     How is medication prescribed:    Specific name of pharmacy

## 2021-11-04 NOTE — TELEPHONE ENCOUNTER
Requesting Phentermine and Ozempic  LOV: 7/28/21  RTC: one month  Last Relevant Labs: 7/24/20  Filled: 7/28/21 #3 ml with 0 refills  Ozempic 1 mg  Filled: 7/28/21 #30 with 0 refills Phentermine  Last [place]28[place]21 [place]    Future Appoin

## 2022-01-02 RX ORDER — PHENTERMINE HYDROCHLORIDE 37.5 MG/1
37.5 TABLET ORAL
Qty: 30 TABLET | Refills: 0 | Status: SHIPPED | OUTPATIENT
Start: 2022-01-02

## 2022-01-02 RX ORDER — SEMAGLUTIDE 1.34 MG/ML
0.25 INJECTION, SOLUTION SUBCUTANEOUS WEEKLY
Qty: 1.5 ML | Refills: 0 | Status: SHIPPED | OUTPATIENT
Start: 2022-01-02

## 2022-04-11 NOTE — TELEPHONE ENCOUNTER
Requesting   Requested Prescriptions     Pending Prescriptions Disp Refills   • OZEMPIC, 0.25 OR 0.5 MG/DOSE, 2 MG/1.5ML Subcutaneous Solution Pen-injector 1.5 mL 0     Sig: Inject 0.25 mg into the skin once a week.    • Phentermine HCl 37.5 MG Oral Tab 30 Price (Do Not Change): 0.00 Instructions: This plan will send the code FBSD to the PM system.  DO NOT or CHANGE the price. Detail Level: Simple

## 2022-05-17 ENCOUNTER — OFFICE VISIT (OUTPATIENT)
Dept: INTERNAL MEDICINE CLINIC | Facility: CLINIC | Age: 57
End: 2022-05-17
Payer: COMMERCIAL

## 2022-05-17 VITALS
HEART RATE: 78 BPM | HEIGHT: 65.5 IN | WEIGHT: 242 LBS | DIASTOLIC BLOOD PRESSURE: 80 MMHG | BODY MASS INDEX: 39.84 KG/M2 | RESPIRATION RATE: 16 BRPM | SYSTOLIC BLOOD PRESSURE: 120 MMHG

## 2022-05-17 DIAGNOSIS — R63.5 WEIGHT GAIN: ICD-10-CM

## 2022-05-17 DIAGNOSIS — Z51.81 ENCOUNTER FOR THERAPEUTIC DRUG MONITORING: Primary | ICD-10-CM

## 2022-05-17 DIAGNOSIS — E66.01 CLASS 2 SEVERE OBESITY WITH SERIOUS COMORBIDITY AND BODY MASS INDEX (BMI) OF 36.0 TO 36.9 IN ADULT, UNSPECIFIED OBESITY TYPE (HCC): ICD-10-CM

## 2022-05-17 DIAGNOSIS — Z80.3 FAMILY HISTORY OF BREAST CANCER IN SISTER: ICD-10-CM

## 2022-05-17 DIAGNOSIS — R63.2 BINGE EATING: ICD-10-CM

## 2022-05-17 DIAGNOSIS — R73.01 IFG (IMPAIRED FASTING GLUCOSE): ICD-10-CM

## 2022-05-17 DIAGNOSIS — E03.9 HYPOTHYROIDISM, UNSPECIFIED TYPE: ICD-10-CM

## 2022-05-17 PROCEDURE — 3079F DIAST BP 80-89 MM HG: CPT | Performed by: INTERNAL MEDICINE

## 2022-05-17 PROCEDURE — 3074F SYST BP LT 130 MM HG: CPT | Performed by: INTERNAL MEDICINE

## 2022-05-17 PROCEDURE — 99214 OFFICE O/P EST MOD 30 MIN: CPT | Performed by: INTERNAL MEDICINE

## 2022-05-17 PROCEDURE — 93000 ELECTROCARDIOGRAM COMPLETE: CPT | Performed by: INTERNAL MEDICINE

## 2022-05-17 PROCEDURE — 3008F BODY MASS INDEX DOCD: CPT | Performed by: INTERNAL MEDICINE

## 2022-05-17 RX ORDER — PHENTERMINE HYDROCHLORIDE 37.5 MG/1
37.5 TABLET ORAL
Qty: 30 TABLET | Refills: 0 | Status: SHIPPED | OUTPATIENT
Start: 2022-05-17 | End: 2022-05-17

## 2022-05-17 RX ORDER — SEMAGLUTIDE 1.34 MG/ML
0.5 INJECTION, SOLUTION SUBCUTANEOUS WEEKLY
Qty: 1 EACH | Refills: 0 | Status: SHIPPED | OUTPATIENT
Start: 2022-05-17

## 2022-05-17 RX ORDER — PHENTERMINE HYDROCHLORIDE 37.5 MG/1
37.5 TABLET ORAL
Qty: 30 TABLET | Refills: 2 | Status: SHIPPED | OUTPATIENT
Start: 2022-05-17

## 2022-07-22 ENCOUNTER — TELEPHONE (OUTPATIENT)
Dept: INTERNAL MEDICINE CLINIC | Facility: CLINIC | Age: 57
End: 2022-07-22

## 2022-08-08 ENCOUNTER — APPOINTMENT (OUTPATIENT)
Dept: ULTRASOUND IMAGING | Age: 57
End: 2022-08-08
Payer: COMMERCIAL

## 2022-08-08 ENCOUNTER — OFFICE VISIT (OUTPATIENT)
Dept: FAMILY MEDICINE CLINIC | Facility: CLINIC | Age: 57
End: 2022-08-08
Payer: COMMERCIAL

## 2022-08-08 ENCOUNTER — HOSPITAL ENCOUNTER (EMERGENCY)
Age: 57
Discharge: HOME | End: 2022-08-08
Attending: EMERGENCY MEDICINE
Payer: COMMERCIAL

## 2022-08-08 VITALS
OXYGEN SATURATION: 99 % | HEIGHT: 67 IN | TEMPERATURE: 98 F | DIASTOLIC BLOOD PRESSURE: 84 MMHG | WEIGHT: 242 LBS | BODY MASS INDEX: 37.98 KG/M2 | RESPIRATION RATE: 16 BRPM | HEART RATE: 68 BPM | SYSTOLIC BLOOD PRESSURE: 134 MMHG

## 2022-08-08 VITALS
WEIGHT: 242 LBS | BODY MASS INDEX: 39.84 KG/M2 | RESPIRATION RATE: 16 BRPM | HEART RATE: 71 BPM | OXYGEN SATURATION: 98 % | DIASTOLIC BLOOD PRESSURE: 74 MMHG | SYSTOLIC BLOOD PRESSURE: 120 MMHG | HEIGHT: 65.5 IN

## 2022-08-08 DIAGNOSIS — Z80.0 FAMILY HISTORY OF PANCREATIC CANCER: ICD-10-CM

## 2022-08-08 DIAGNOSIS — I83.90 VARICOSITY: Primary | ICD-10-CM

## 2022-08-08 DIAGNOSIS — R60.9 DEPENDENT EDEMA: ICD-10-CM

## 2022-08-08 DIAGNOSIS — M79.605 LEFT LEG PAIN: Primary | ICD-10-CM

## 2022-08-08 DIAGNOSIS — Z80.3 FAMILY HISTORY OF BREAST CANCER: ICD-10-CM

## 2022-08-08 DIAGNOSIS — Z80.0 FAMILY HISTORY OF COLON CANCER: ICD-10-CM

## 2022-08-08 PROBLEM — I10 HYPERTENSION: Status: RESOLVED | Noted: 2018-10-04 | Resolved: 2022-08-08

## 2022-08-08 PROCEDURE — 99284 EMERGENCY DEPT VISIT MOD MDM: CPT

## 2022-08-08 PROCEDURE — 93971 EXTREMITY STUDY: CPT | Performed by: EMERGENCY MEDICINE

## 2022-08-08 NOTE — ED INITIAL ASSESSMENT (HPI)
Pt to ED  with c/o left calf pain x 1 year withnew symptom of pain radiation to left thigh over the weekend, pt was seen by PCP today and was sent to ED for r/o DVT

## 2022-08-09 ENCOUNTER — OFFICE VISIT (OUTPATIENT)
Dept: INTERNAL MEDICINE CLINIC | Facility: CLINIC | Age: 57
End: 2022-08-09
Payer: COMMERCIAL

## 2022-08-09 VITALS
HEIGHT: 65.5 IN | DIASTOLIC BLOOD PRESSURE: 80 MMHG | SYSTOLIC BLOOD PRESSURE: 120 MMHG | RESPIRATION RATE: 16 BRPM | WEIGHT: 244 LBS | BODY MASS INDEX: 40.17 KG/M2 | HEART RATE: 78 BPM

## 2022-08-09 DIAGNOSIS — R63.2 BINGE EATING: ICD-10-CM

## 2022-08-09 DIAGNOSIS — Z51.81 ENCOUNTER FOR THERAPEUTIC DRUG MONITORING: Primary | ICD-10-CM

## 2022-08-09 DIAGNOSIS — E66.01 CLASS 2 SEVERE OBESITY WITH SERIOUS COMORBIDITY AND BODY MASS INDEX (BMI) OF 36.0 TO 36.9 IN ADULT, UNSPECIFIED OBESITY TYPE (HCC): ICD-10-CM

## 2022-08-09 PROCEDURE — 3079F DIAST BP 80-89 MM HG: CPT | Performed by: INTERNAL MEDICINE

## 2022-08-09 PROCEDURE — 3074F SYST BP LT 130 MM HG: CPT | Performed by: INTERNAL MEDICINE

## 2022-08-09 PROCEDURE — 99214 OFFICE O/P EST MOD 30 MIN: CPT | Performed by: INTERNAL MEDICINE

## 2022-08-09 PROCEDURE — 3008F BODY MASS INDEX DOCD: CPT | Performed by: INTERNAL MEDICINE

## 2022-08-09 RX ORDER — SEMAGLUTIDE 1.34 MG/ML
1 INJECTION, SOLUTION SUBCUTANEOUS WEEKLY
Qty: 4.5 ML | Refills: 0 | Status: SHIPPED | OUTPATIENT
Start: 2022-08-09

## 2022-12-08 ENCOUNTER — OFFICE VISIT (OUTPATIENT)
Dept: FAMILY MEDICINE CLINIC | Facility: CLINIC | Age: 57
End: 2022-12-08
Payer: COMMERCIAL

## 2022-12-08 VITALS
TEMPERATURE: 97 F | OXYGEN SATURATION: 100 % | HEIGHT: 67 IN | BODY MASS INDEX: 36.73 KG/M2 | RESPIRATION RATE: 18 BRPM | SYSTOLIC BLOOD PRESSURE: 124 MMHG | WEIGHT: 234 LBS | HEART RATE: 81 BPM | DIASTOLIC BLOOD PRESSURE: 72 MMHG

## 2022-12-08 DIAGNOSIS — R39.9 UTI SYMPTOMS: Primary | ICD-10-CM

## 2022-12-08 DIAGNOSIS — N30.00 ACUTE CYSTITIS WITHOUT HEMATURIA: ICD-10-CM

## 2022-12-08 LAB
BILIRUBIN: NEGATIVE
GLUCOSE (URINE DIPSTICK): NEGATIVE MG/DL
KETONES (URINE DIPSTICK): 40 MG/DL
MULTISTIX LOT#: ABNORMAL NUMERIC
NITRITE, URINE: POSITIVE
PH, URINE: 5 (ref 4.5–8)
PROTEIN (URINE DIPSTICK): 100 MG/DL
SPECIFIC GRAVITY: >1.03 (ref 1–1.03)
UROBILINOGEN,SEMI-QN: 0.2 MG/DL (ref 0–1.9)

## 2022-12-08 PROCEDURE — 87088 URINE BACTERIA CULTURE: CPT | Performed by: NURSE PRACTITIONER

## 2022-12-08 PROCEDURE — 87186 SC STD MICRODIL/AGAR DIL: CPT | Performed by: NURSE PRACTITIONER

## 2022-12-08 PROCEDURE — 3008F BODY MASS INDEX DOCD: CPT | Performed by: NURSE PRACTITIONER

## 2022-12-08 PROCEDURE — 3078F DIAST BP <80 MM HG: CPT | Performed by: NURSE PRACTITIONER

## 2022-12-08 PROCEDURE — 87086 URINE CULTURE/COLONY COUNT: CPT | Performed by: NURSE PRACTITIONER

## 2022-12-08 PROCEDURE — 81003 URINALYSIS AUTO W/O SCOPE: CPT | Performed by: NURSE PRACTITIONER

## 2022-12-08 PROCEDURE — 99213 OFFICE O/P EST LOW 20 MIN: CPT | Performed by: NURSE PRACTITIONER

## 2022-12-08 PROCEDURE — 3074F SYST BP LT 130 MM HG: CPT | Performed by: NURSE PRACTITIONER

## 2022-12-08 RX ORDER — NITROFURANTOIN 25; 75 MG/1; MG/1
100 CAPSULE ORAL 2 TIMES DAILY
Qty: 10 CAPSULE | Refills: 0 | Status: SHIPPED | OUTPATIENT
Start: 2022-12-08 | End: 2022-12-13

## 2022-12-28 ENCOUNTER — OFFICE VISIT (OUTPATIENT)
Dept: INTERNAL MEDICINE CLINIC | Facility: CLINIC | Age: 57
End: 2022-12-28
Payer: COMMERCIAL

## 2022-12-28 VITALS
RESPIRATION RATE: 16 BRPM | BODY MASS INDEX: 37.2 KG/M2 | HEART RATE: 82 BPM | DIASTOLIC BLOOD PRESSURE: 80 MMHG | SYSTOLIC BLOOD PRESSURE: 126 MMHG | HEIGHT: 67 IN | OXYGEN SATURATION: 98 % | WEIGHT: 237 LBS

## 2022-12-28 DIAGNOSIS — Z51.81 ENCOUNTER FOR THERAPEUTIC DRUG MONITORING: Primary | ICD-10-CM

## 2022-12-28 DIAGNOSIS — R73.01 IFG (IMPAIRED FASTING GLUCOSE): ICD-10-CM

## 2022-12-28 DIAGNOSIS — E03.9 HYPOTHYROIDISM, UNSPECIFIED TYPE: ICD-10-CM

## 2022-12-28 DIAGNOSIS — E66.01 CLASS 2 SEVERE OBESITY WITH SERIOUS COMORBIDITY AND BODY MASS INDEX (BMI) OF 36.0 TO 36.9 IN ADULT, UNSPECIFIED OBESITY TYPE (HCC): ICD-10-CM

## 2022-12-28 DIAGNOSIS — E78.2 MIXED HYPERLIPIDEMIA: ICD-10-CM

## 2022-12-28 DIAGNOSIS — R63.2 BINGE EATING: ICD-10-CM

## 2022-12-28 DIAGNOSIS — I10 ESSENTIAL HYPERTENSION: ICD-10-CM

## 2022-12-28 PROCEDURE — 3079F DIAST BP 80-89 MM HG: CPT | Performed by: NURSE PRACTITIONER

## 2022-12-28 PROCEDURE — 3008F BODY MASS INDEX DOCD: CPT | Performed by: NURSE PRACTITIONER

## 2022-12-28 PROCEDURE — 3074F SYST BP LT 130 MM HG: CPT | Performed by: NURSE PRACTITIONER

## 2022-12-28 PROCEDURE — 99214 OFFICE O/P EST MOD 30 MIN: CPT | Performed by: NURSE PRACTITIONER

## 2022-12-28 RX ORDER — DIETHYLPROPION HYDROCHLORIDE 75 MG/1
1 TABLET ORAL EVERY MORNING
Qty: 30 TABLET | Refills: 1 | Status: SHIPPED | OUTPATIENT
Start: 2022-12-28 | End: 2023-01-27

## 2022-12-28 RX ORDER — SEMAGLUTIDE 1.34 MG/ML
0.25 INJECTION, SOLUTION SUBCUTANEOUS WEEKLY
Qty: 1.5 ML | Refills: 1 | Status: SHIPPED | OUTPATIENT
Start: 2022-12-28

## 2022-12-28 NOTE — PATIENT INSTRUCTIONS
Next steps:  1. Fill your prescribed medication and take as discussed and prescribed: stop phentermine   Will trial diethylpropion 75mg daily  Will restart ozempic 0.25mg X 3 weeks, 0.5mg X 4 weeks and 1mg X 4 weeks and stay at this dose   2. Schedule a personal nutrition consultation with one of our registered dieticians  3. Check out interm. Fasting 16:8 (dr. Yolie Maldonado)      Please try to work on the following dietary changes:  Daily protein recommendation to start:   grams  Daily carbohydrate: <125g  Daily calories: 1,500  1. Drink water with meals and throughout the day, cut down on soda and/or juice if consumed. Consider flavored water options like Bubbly, Spindrift, Hint and Sandra. 2.  Eat breakfast daily and focus on having protein with each meal, examples include: greek yogurt, cottage cheese, hard boiled egg, whole grain toast with peanut butter. 3.  Reduce refined carbohydrates and sugars which includes items such as sweets, as well as rice, pasta, and bread and make sure to choose whole grain options when having them with just 1 serving per meal about the size of your inner palm. 4.  Consume non starchy veggies daily working towards making them a good 50% of your daily food intake. Add them to lunch and dinner consistently. 5.  Start a daily probiotic: VSL#3 is recommended, (order on line at www.vsl3. com). Take 1 capsule daily with water for 30 days, then reduce to 1 every other day (this will reduce the cost). Capsules can be left out for 2 weeks, but then must be refrigerated. Please download tomás Superplayer Fitness Juan Carlos Dsouza! Or Net Diary to monitor daily dietary intake and you will be able to see if you are eating the right amount of calories or too much or too little which would hinder weight loss. Additionally this will help to see your daily carbohydrate and protein intake.  When you set the tomás up choose 1-2 lbs/week as a goal.  Keeping a paper food journal is an option as well to remain accountable for your choices- this is the start to mindful eating! A low calorie diet has been consistently shown to support weight loss. Continue or start exercising to help establish a routine. If not already exercising begin with 1 day and progress as able with long-term goal of 30 minutes 5 days a week at a minimum. Meditation daily can help manage and control stress. Chronic stress can make weight loss difficult. Exercising is one way to help with stress, but meditation using the CALM Monet or another comparable alternative can be done in your home or place of work with little time commitment. This Monet can also help work on behavior change and improve sleep. Check out the segment under Calm Masterclass and listen to The 4 Pillars of Health. A great way to begin learning about the foundation of lifestyle with practical tips to use in your every day. Check out www.yourweightmatters. org blog for continued daily support and education along this weight loss journey! Patient Resources:     Personal Training/Fitness Classes/Health Coaching     [facility] KLEBERG and [place] @ [email or web address] Full fitness center with group fitness and personal training. Discount available as client of [facility] Weight Management. Health Coaching and Personal Training with Anthony Jones at our [facility]- individual weekly coaching with option to add personal training and small group fitness classes targeted at weight loss- [phone] and/or email @ Moni [email or web address]. [email or web address]  360FIT [place] [email or web address] Group Fitness [phone] and/or email Tyler Lopez at [email or web address]  [address] with multiple locations: Aetna (www.Hyginex), Eat The Frog Fitness (www.[place] [employer] (PPWSA). Cashier Live), Calpian Body Bootcamp (www.Tutor Technologiesp.Cashier Live), Radiation Watch (www.Cascade Technologies. Cashier Live), The Exercise  (www.exercisecoach.Zodio)     Online Fitness  Fitness  on Whole Foods in 10 DVD series- www. gpyva14IHM. Zodio  Sit and Be Fit - Chair exercise series Www.sitandbefit. org  Hip Hop Fit with Melvin Carter at www.hiphopfit. net     Apps for on the HolyTransaction 7 Minute Workout (orange box with white 7) - free on the go HIIT training monet  Peloton Monet @ wwwWyoos     Nutrition Trackers and Tools  LoseIT! And My Fitness Pal apps and on line for tracking nutrition  NOOM - virtual health coaching  FitFoundation (healthy meals on the go) in [employer] @ www. caftfkjqzgpas0t. Romeo Skinner MD @ wwwChi2gel and Rose Sanchez (keto and low carb plans recommended) @ www. YMHTBR32.DJO, Metabolic Meals @ www. MyMetabolicMeals. com - individual prepared meals to go  [facility] Shoebox, [facility], [facility], [facility]- on line meal delivery programs for preparation at home  [employer] in [place] for homemade meals to go @ wwwTypo Keyboards  Diet Doctor @ www. dietdoctor. com - low carb swaps  Yu3D Forms - meal prep and planning monet (www.yummly. com)     Stress Management/Behavior/Mindful Eating  CALM meditation monet (www.calmAquaMobile)  Headspace  Am I Hungry? Mindful eating virtual  monet  Www.yourweightmatters. org - Obesity Action Coalition sponsored Blog posts daily  Motivation monet (black box with white \")- daily supportive messages sent to your phone     Books/Video Education/Podcasts  Mindless Eating by Albino Li  Why We Get Sick by Candice Ownes (a book about insulin resistance)  Atomic Habits by Evertt Barthel (a book about taking small steps to promote greater behavior change)   Can't Hurt Me by Griffin Foster (a book exploring the power of discipline in achieving your goals)  The End of Dieting: How to Live for Life by Dr. Yessenia Gaspar M.D. or listen to The [address] [facility] [address] Episode 61: Understanding \"Nutritarian\" Eating w/Dr. Yessenia Gaspar  Your Body in Balance:  The World [employer] Corporation of Food, Hormones, and Health by  Shant Kinney  The Menopause Diet Plan by [facility] AT [facility]  The Complete Guide to fasting by Dr. Jasbir Jha, [address] [facility] by Patricio Rios, Ph.D, R.D. Weight Loss Surgery Will Not Treat Food Addiction by Sylvester Rea Ph.D  The [address] [place] [address] on plant based nutrition  Fed Up - documentary about obesity (Free on [place])  The Truth About Sugar - documentary on sugar (Free on Weblance, https://youReadyforce. be/5D3mvsdMS9h)  The Dr. Chantel Au by Dr. Cristina Cabrera MD  Fitlosophy Fitspiration - journal to better health (found at [employer] in fitness aisle)  What Happened to You?- a look at the impact trauma has on behavior written by Berta Monet and Dr. Erin Schulz Again by Zoey Pitts - discovering your true self after trauma  Angelica Dumas talk on [employer], The Call to Courage  Podcasts: The Exam Room by the Physician's Committee, Nutrition Facts by Dr. Ashley Phillips    We are here to support you with weight loss, but please remember that you still need your primary care provider for your routine health maintenance.

## 2023-02-23 RX ORDER — PHENTERMINE HYDROCHLORIDE 37.5 MG/1
TABLET ORAL
Qty: 30 TABLET | Refills: 0 | OUTPATIENT
Start: 2023-02-23

## 2023-02-23 NOTE — TELEPHONE ENCOUNTER
Requesting phentermine 37.5 mg  LOV: 12/28/22  RTC: 8 weeks  Last Relevant Labs: na  Filled: 11/9/22 #30 with 1 refills last [place] 1[place]11/23 [place]    Future Appointments   Date Time Provider Ben Fisher   3/31/2023 11:40 AM ARAM Vera [facility]     After visit 12/28/22 patient was changed to diethylpropion - denied this request

## 2023-04-21 RX ORDER — DIETHYLPROPION HYDROCHLORIDE 75 MG/1
TABLET ORAL
Qty: 30 TABLET | Refills: 0 | OUTPATIENT
Start: 2023-04-21

## 2023-06-05 ENCOUNTER — OFFICE VISIT (OUTPATIENT)
Dept: INTERNAL MEDICINE CLINIC | Facility: CLINIC | Age: 58
End: 2023-06-05
Payer: COMMERCIAL

## 2023-06-05 VITALS
HEART RATE: 88 BPM | DIASTOLIC BLOOD PRESSURE: 80 MMHG | SYSTOLIC BLOOD PRESSURE: 130 MMHG | OXYGEN SATURATION: 98 % | HEIGHT: 67 IN | BODY MASS INDEX: 37.2 KG/M2 | WEIGHT: 237 LBS | RESPIRATION RATE: 16 BRPM

## 2023-06-05 DIAGNOSIS — Z51.81 ENCOUNTER FOR THERAPEUTIC DRUG MONITORING: Primary | ICD-10-CM

## 2023-06-05 DIAGNOSIS — I10 ESSENTIAL HYPERTENSION: ICD-10-CM

## 2023-06-05 DIAGNOSIS — E78.2 MIXED HYPERLIPIDEMIA: ICD-10-CM

## 2023-06-05 DIAGNOSIS — E03.9 HYPOTHYROIDISM, UNSPECIFIED TYPE: ICD-10-CM

## 2023-06-05 DIAGNOSIS — R63.2 BINGE EATING: ICD-10-CM

## 2023-06-05 DIAGNOSIS — R73.01 IFG (IMPAIRED FASTING GLUCOSE): ICD-10-CM

## 2023-06-05 DIAGNOSIS — E66.9 OBESITY (BMI 30-39.9): ICD-10-CM

## 2023-06-05 PROBLEM — E66.3 OVERWEIGHT (BMI 25.0-29.9): Status: ACTIVE | Noted: 2023-06-05

## 2023-06-05 PROCEDURE — 3079F DIAST BP 80-89 MM HG: CPT | Performed by: NURSE PRACTITIONER

## 2023-06-05 PROCEDURE — 3075F SYST BP GE 130 - 139MM HG: CPT | Performed by: NURSE PRACTITIONER

## 2023-06-05 PROCEDURE — 99214 OFFICE O/P EST MOD 30 MIN: CPT | Performed by: NURSE PRACTITIONER

## 2023-06-05 PROCEDURE — 3008F BODY MASS INDEX DOCD: CPT | Performed by: NURSE PRACTITIONER

## 2023-06-05 RX ORDER — PHENTERMINE HYDROCHLORIDE 37.5 MG/1
37.5 TABLET ORAL
Qty: 30 TABLET | Refills: 2 | Status: SHIPPED | OUTPATIENT
Start: 2023-06-05

## 2023-06-05 NOTE — PATIENT INSTRUCTIONS
Next steps:  1. Fill your prescribed medication and take as discussed and prescribed:   Restart phentermine  Will trial wegovy 0.25mg weekly X 4 weeks (sample)- send in gantto message to send in 0.5mg   2. Schedule a personal nutrition consultation with one of our registered dieticians     Please try to work on the following dietary changes:  Daily protein recommendation to start:   grams  Daily carbohydrate: <120g  Daily calories: 1,500-1,600  1. Drink water with meals and throughout the day, cut down on soda and/or juice if consumed. Consider flavored water options like Bubbly, Spindrift, Hint and Sandra. 2.  Eat breakfast daily and focus on having protein with each meal, examples include: greek yogurt, cottage cheese, hard boiled egg, whole grain toast with peanut butter. 3.  Reduce refined carbohydrates and sugars which includes items such as sweets, as well as rice, pasta, and bread and make sure to choose whole grain options when having them with just 1 serving per meal about the size of your inner palm. 4.  Consume non starchy veggies daily working towards making them a good 50% of your daily food intake. Add them to lunch and dinner consistently. 5.  Start a daily probiotic: VSL#3 is recommended, (order on line at www.vsl3. com). Take 1 capsule daily with water for 30 days, then reduce to 1 every other day (this will reduce the cost). Capsules can be left out for 2 weeks, but then must be refrigerated. Please download tomás My Fitness Donny President! Or Net Diary to monitor daily dietary intake and you will be able to see if you are eating the right amount of calories or too much or too little which would hinder weight loss. Additionally this will help to see your daily carbohydrate and protein intake. When you set the tomás up choose 1-2 lbs/week as a goal.  Keeping a paper food journal is an option as well to remain accountable for your choices- this is the start to mindful eating!  A low calorie diet has been consistently shown to support weight loss. Continue or start exercising to help establish a routine. If not already exercising begin with 1 day and progress as able with long-term goal of 30 minutes 5 days a week at a minimum. Meditation daily can help manage and control stress. Chronic stress can make weight loss difficult. Exercising is one way to help with stress, but meditation using the CALM Monet or another comparable alternative can be done in your home or place of work with little time commitment. This Monet can also help work on behavior change and improve sleep. Check out the segment under Calm Masterclass and listen to The 4 Pillars of Health. A great way to begin learning about the foundation of lifestyle with practical tips to use in your every day. Check out www.yourweightmatters. org blog for continued daily support and education along this weight loss journey! Patient Resources:     Personal Training/Fitness Classes/Health Coaching     Filion TAMELA SANCHEZ and Murry Oraliasherita @ [email or web address] Full fitness center with group fitness and personal training. Discount available as client of [facility]. Health Coaching and Personal Training with Galilea Pacheco at our [facility]- individual weekly coaching with option to add personal training and small group fitness classes targeted at weight loss- [phone] and/or email @ [email or web address]  360FIT [place] [email or web address] Group Fitness [phone] and/or email Vin Danielson at [email or web address] Edge  [address] with multiple locations: Aetna (www.Revolution Analytics), Eat The Frog Fitness (www.RealTargeting. Study Edge), Fit Body Bootcamp (www.Guardiump.Study Edge), Semadic (www.CrowdTransfer), The Exercise  (www.exercisecoach.Study Edge)     Online Fitness  Fitness  on Whole Foods in 10 DVD series- www. jgjkr76EKA. twtMob  Sit and Be Fit - Chair exercise series Www.sitandbefit. org  Hip Hop Fit with Melvin Carter at www.hiphopfit. net     Apps for on the Bagel Nash 7 Minute Workout (orange box with white 7) - free on the go HIIT training monet  Peloton Monet @ wwwMahoot Games     Nutrition Trackers and Tools  LoseIT! And My Fitness Pal apps and on line for tracking nutrition  NOOM - virtual health coaching  FitFoundation (healthy meals on the go) in [facility] @ www. hsvidtigozvdo6c. Adrianne January MD @ www.bistromd.com and Paul Grande (keto and low carb plans recommended) @ www. TFBBDP37.XLA, Metabolic Meals @ www. Common GroundMetabolicMeals. com - individual prepared meals to go  Engineering Ideas, Glassy Pro, International Business Machines, Every Plate, SplashMaps- on line meal delivery programs for preparation at home  [place] [employer] in [place] for homemade meals to go @ wwwLuxrmealThe Beer CafÃ©. twtMob  Diet Doctor @ www. dietdoctor. twtMob - low carb swaps  YuWallstr - meal prep and planning monet (www.yummly. com)     Stress Management/Behavior/Mindful Eating  CALM meditation monet (www.calm. twtMob)  Headspace  Am I Hungry? Mindful eating virtual  monet  Www.yourweightmatters. org - Obesity Action Coalition sponsored Blog posts daily  Motivation monet (black box with white \")- daily supportive messages sent to your phone     Books/Video Education/Podcasts  Mindless Eating by Michael Escort  Why We Get Sick by Catalina Jarquin (a book about insulin resistance)  Atomic Habits by Jennifer Saldana (a book about taking small steps to promote greater behavior change)   Can't Hurt Me by Scar Ramsay (a book exploring the power of discipline in achieving your goals)  The End of Dieting: How to Live for Life by Dr. Brittany Salvador M.D. or listen to The [address] [facility] [address] Episode 61: Understanding \"Nutritarian\" Eating w/Dr. Brittany Salvador  Your Body in Balance:  The World Fuel Services Corporation of Food, Hormones, and Health by Dr. Loan Strauss  The Menopause Diet Plan by Jada Garibay and Trinidad Montague Thony  The Complete Guide to fasting by Dr. Isabella Browne, [address] [facility] by Jett Mathis, Ph.D, R.D. Weight Loss Surgery Will Not Treat Food Addiction by Tiana Rodriguez Ph.D  The [address] [place] [address] on plant based nutrition  Fed Up - documentary about obesity (Free on [facility])  The Truth About Sugar - documentary on sugar (Free on Utube, https://youtu. be/9U1nurjJM6h)  The Dr. Ruffin Gaucher by Dr. Bard Carine MD  Fitlosophy Fitspiration - journal to better health (found at [employer] in fitness aisle)  What Happened to You?- a look at the impact trauma has on behavior written by Jaylen Rodríguez and Dr. Krys Treviño Again by Edna Kyle - discovering your true self after trauma  Kimber Bell talk on Metooo, The Call to Courage  Podcasts: The Exam Room by the Physician's Committee, Nutrition Facts by Dr. Hope Burleson    We are here to support you with weight loss, but please remember that you still need your primary care provider for your routine health maintenance.

## 2023-06-16 ENCOUNTER — PATIENT MESSAGE (OUTPATIENT)
Dept: INTERNAL MEDICINE CLINIC | Facility: CLINIC | Age: 58
End: 2023-06-16

## 2023-06-16 ENCOUNTER — TELEPHONE (OUTPATIENT)
Dept: FAMILY MEDICINE CLINIC | Facility: CLINIC | Age: 58
End: 2023-06-16

## 2023-06-16 NOTE — TELEPHONE ENCOUNTER
Problems   The patient or proxy has not reviewed this information. Medications   The patient or proxy has not reviewed this information, and there are updates pending:   Requested Medication Removals Start Date Reported By  Comments   Blood Pressure Monitoring Does not apply Kit 5/30/2019 Kiarra Dexter    albuterol 108 (60 Base) MCG/ACT Aers 1/22/2020 Kiarra Dexter    Ozempic (0.25 or 0.5 MG/DOSE) 2 MG/1.5ML Sopn 12/28/2022 Doreen Dexter    Ozempic (1 MG/DOSE) 2 MG/1.5ML Sopn 11/9/2022 Kiarra Dexter    ergocalciferol 1.25 MG (42447 UT) Caps 8/1/2018 Roxi Kim      Allergies   The patient or proxy has not reviewed this information. Medication list updated as requested.

## 2023-06-18 RX ORDER — SEMAGLUTIDE 0.5 MG/.5ML
0.5 INJECTION, SOLUTION SUBCUTANEOUS WEEKLY
Qty: 2 ML | Refills: 0 | Status: SHIPPED | OUTPATIENT
Start: 2023-06-18 | End: 2023-07-10

## 2023-06-22 ENCOUNTER — TELEPHONE (OUTPATIENT)
Dept: INTERNAL MEDICINE CLINIC | Facility: CLINIC | Age: 58
End: 2023-06-22

## 2023-07-03 NOTE — TELEPHONE ENCOUNTER
[facility] ASKED FOR DIAGNOSIS, SENT TO [facility].       E66.9) Obesity (BMI 30-39.9)     (R63.2) Binge eating     (R73.01) IFG (impaired fasting glucose)     (E03.9) Hypothyroidism, unspecified type     (I10) Essential hypertension     (E78.2) Mixed hyperlipidemia

## 2023-07-06 NOTE — PROGRESS NOTES
[facility] WEIGHT MANAGEMENT VIRTUAL ENCOUNTER     Ernestina Vazquez verbally consents to a Virtual/Telephone Check-In service on 07/07/23   Patient understands and accepts financial responsibility for any deductible, co-insurance and/or co-pays associated with this service. HISTORY OF PRESENT ILLNESS  No chief complaint on file. Ernestina Vazquez is a 65year old female is being evaluated as a video visit using Telemedicine with live, interactive video and audio    Weight gain/loss since LOV based on home monitoring:   Home scale: 243 lbs  Has up #6 lbs since LOV 1 months ago     Compliance with medication: phentermine 37.5mg (started it last week and is doing 15mg),  wegovy 0.25mg (didn't start yet- since was waiting to hear back from insurance if it was covered or not)   Tolerating well, helping with decreasing appetite and no side effects     Is a little frustrated that she hasn't lost weight. .   Retired, is getting into a new routine with exercising- walking daily   Exercise/Activity: 2-4x/ week, via walking, yard-work   Nutrition: skipping meals, +protein, minimal veggies. Not tracking reports  Stress is manageable    Denies chest pain, shortness of breath, dizziness, blurred vision, headache, paresthesia, nausea/vomiting.      Wt Readings from Last 6 Encounters:  06/05/23 : 237 lb (107.5 kg)  12/28/22 : 237 lb (107.5 kg)  12/08/22 : 234 lb (106.1 kg)  08/09/22 : 244 lb (110.7 kg)  08/08/22 : 242 lb (109.8 kg)  08/08/22 : 242 lb (109.8 kg)         Subjective  REVIEW OF SYSTEMS  GENERAL HEALTH: feels well otherwise, denied any fevers chills or night sweats   RESPIRATORY: denies shortness of breath   CARDIOVASCULAR: denies chest pain  GI: denies abdominal pain  PSYCH: denies any mood changes    Objective  EXAM  Reviewed most recent set of vitals   Physical Exam:  GENERAL: well developed, well nourished, in no apparent distress, speaking in full sentences comfortably   SKIN: warm, pink, dry without rashes to exposed area   EYES: conjunctiva pink  HEENT: atraumatic, normocephalic  LUNGS: normal work of breathing, non labored  CARDIO: normal work, no exertion  EXTREMITIES: no cyanosis, no clubbing, no edema  NEURO: Oriented times three  PSYCH: pleasant, cooperative, normal mood and affect    Lab Results   Component Value Date    WBC 7.6 07/24/2020    RBC 4.83 07/24/2020    HGB 13.8 07/24/2020    HCT 44.0 07/24/2020    MCV 91.1 07/24/2020    MCH 28.6 07/24/2020    MCHC 31.4 07/24/2020    RDW 12.5 07/24/2020    .0 07/24/2020     Lab Results   Component Value Date    GLU 85 07/24/2020    BUN 18 07/24/2020    BUNCREA 18.4 07/24/2020    CREATSERUM 0.98 07/24/2020    ANIONGAP 2 07/24/2020    GFR 90 03/04/2017    GFRNAA 66 07/24/2020    GFRAA 76 07/24/2020    CA 9.8 07/24/2020    OSMOCALC 289 07/24/2020    ALKPHO 80 07/24/2020    AST 17 07/24/2020    ALT 28 07/24/2020    BILT 0.7 07/24/2020    TP 7.9 07/24/2020    ALB 3.8 07/24/2020    GLOBULIN 4.1 07/24/2020     07/24/2020    K 3.7 07/24/2020     07/24/2020    CO2 29.0 07/24/2020     Lab Results   Component Value Date     07/24/2020    A1C 5.6 07/24/2020     Lab Results   Component Value Date    CHOLEST 214 (H) 07/24/2020    TRIG 83 07/24/2020    HDL 58 07/24/2020     (H) 07/24/2020    VLDL 17 07/24/2020    TCHDLRATIO 2.67 02/08/2018    NONHDLC 156 (H) 07/24/2020     Lab Results   Component Value Date    T4F 1.4 03/04/2017    TSH 2.270 07/24/2020     Lab Results   Component Value Date    B12 424 07/24/2020     Lab Results   Component Value Date    VITD 42.9 07/24/2020       semaglutide-weight management (WEGOVY) 0.5 MG/0.5ML Subcutaneous Solution Auto-injector, Inject 0.5 mL (0.5 mg total) into the skin once a week for 4 doses. , Disp: 2 mL, Rfl: 0  Phentermine HCl 37.5 MG Oral Tab, Take 1 tablet (37.5 mg total) by mouth every morning before breakfast., Disp: 30 tablet, Rfl: 2  Levothyroxine Sodium (SYNTHROID) 88 MCG Oral Tab, Take 1 tablet (88 mcg total) by mouth before breakfast., Disp: 90 tablet, Rfl: 0    ipratropium-albuterol (DUONEB) nebulizer solution 3 mL, 3 mL, Nebulization, Once, [place], [place], APRN        ASSESSMENT  Analyzed weight data:       Diagnoses and all orders for this visit:    Encounter for therapeutic drug monitoring    Obesity (BMI 30-39. 9)    Binge eating    IFG (impaired fasting glucose)    Hypothyroidism, unspecified type    Essential hypertension    Mixed hyperlipidemia      PLAN  Continue with medications: phentermine 37.5mg   Will start wegovy 0.25mg weekly X 4 weeks (sample- encouraged to take and she will send in the information that the insurance company gave her)   --advised of side effects and adverse effects of this medication  Contradictions: has done vyvanse, belviq, saxenda, qsymia, metformin, VSL #3, topamax, diethylpropion, ozempic    Reviewed labs  HTN  blood pressure is in office is stable - continue present management  HLD  stable on current medication regimen, continue to follow-up with PCP  Hypothyroid- stable, continue with current medication regimen, managed by PCP   Encouraged to start exercising and add in strength training   Advised to monitor blood pressure and pulse at home/ given parameters to review and contact provider. Nutrition: low carb diet/ recommended to eat breakfast daily/ regular protein intake  Follow up with dietitian and psychologist as recommended. Discussed the role of sleep and stress in weight management. Counseled on comprehensive weight loss plan including attention to nutrition, exercise and behavior/stress management for success. See patient instruction below for more details. Discussed strategies to overcome barriers to successful weight loss and weight maintenance  FITTE: ACSM recommendations (150-300 minutes/ week in active weight loss)       There are no Patient Instructions on file for this visit. No follow-ups on file.     Patient verbalizes understanding. Total time spent on chart review, pre-charting, obtaining history, counseling, and educating, reviewing labs was 24 minutes. Pt understands phone/video evaluation is not a substitute for face to face examination or emergency care. Pt advised to go to the ER or call 911 for worsening symptoms or acute distress. Please note that the following visit was completed using two-way, real-time interactive audio and/or video communication. This has been done in good moreno to provide continuity of care in the best interest of the provider-patient relationship, due to the ongoing public health crisis/national emergency and because of restrictions of visitation. There are limitations of this visit as no physical exam could be performed. Every conscious effort was taken to allow for sufficient and adequate time. This billing was spent on reviewing labs, medications, radiology tests and decision making. Appropriate medical decision-making and tests are ordered as detailed in the plan of care above. NOTE TO PATIENT: The 21st Century Cures Act makes clinical notes like these available to patients in the interest of transparency. Clinical notes are medical documents used by physicians and care providers to communicate with each other. These documents include medical language and terminology, abbreviations, and treatment information that may sound technical and at times possibly unfamiliar. In addition, at times, the verbiage may appear blunt or direct. These documents are one tool providers use to communicate relevant information and clinical opinions of the care providers in a way that allows common understanding of the clinical context.      Kenna Flaherty, APRN  7/6/2023

## 2023-07-07 ENCOUNTER — TELEMEDICINE (OUTPATIENT)
Dept: INTERNAL MEDICINE CLINIC | Facility: CLINIC | Age: 58
End: 2023-07-07
Payer: COMMERCIAL

## 2023-07-07 DIAGNOSIS — E03.9 HYPOTHYROIDISM, UNSPECIFIED TYPE: ICD-10-CM

## 2023-07-07 DIAGNOSIS — R73.01 IFG (IMPAIRED FASTING GLUCOSE): ICD-10-CM

## 2023-07-07 DIAGNOSIS — E78.2 MIXED HYPERLIPIDEMIA: ICD-10-CM

## 2023-07-07 DIAGNOSIS — E66.9 OBESITY (BMI 30-39.9): ICD-10-CM

## 2023-07-07 DIAGNOSIS — Z51.81 ENCOUNTER FOR THERAPEUTIC DRUG MONITORING: Primary | ICD-10-CM

## 2023-07-07 DIAGNOSIS — I10 ESSENTIAL HYPERTENSION: ICD-10-CM

## 2023-07-07 DIAGNOSIS — R63.2 BINGE EATING: ICD-10-CM

## 2023-07-07 PROCEDURE — 99213 OFFICE O/P EST LOW 20 MIN: CPT | Performed by: NURSE PRACTITIONER

## 2023-07-07 NOTE — PATIENT INSTRUCTIONS
Next steps:  1. Fill your prescribed medication and take as discussed and prescribed: phentermine and wegovy  2. Schedule a personal nutrition consultation with one of our registered dieticians       1. Drink water with meals and throughout the day, cut down on soda and/or juice if consumed. Consider flavored water options like Bubbly, Spindrift, Hint and Sandra. 2.  Eat breakfast daily and focus on having protein with each meal, examples include: greek yogurt, cottage cheese, hard boiled egg, whole grain toast with peanut butter. 3.  Reduce refined carbohydrates and sugars which includes items such as sweets, as well as rice, pasta, and bread and make sure to choose whole grain options when having them with just 1 serving per meal about the size of your inner palm. 4.  Consume non starchy veggies daily working towards making them a good 50% of your daily food intake. Add them to lunch and dinner consistently. 5.  Start a daily probiotic: VSL#3 is recommended, (order on line at www.vsl3. com). Take 1 capsule daily with water for 30 days, then reduce to 1 every other day (this will reduce the cost). Capsules can be left out for 2 weeks, but then must be refrigerated. Please download tomás My Fitness Michel Gasmen! Or Net Diary to monitor daily dietary intake and you will be able to see if you are eating the right amount of calories or too much or too little which would hinder weight loss. Additionally this will help to see your daily carbohydrate and protein intake. When you set the tomás up choose 1-2 lbs/week as a goal.  Keeping a paper food journal is an option as well to remain accountable for your choices- this is the start to mindful eating! A low calorie diet has been consistently shown to support weight loss. Continue or start exercising to help establish a routine. If not already exercising begin with 1 day and progress as able with long-term goal of 30 minutes 5 days a week at a minimum. Meditation daily can help manage and control stress. Chronic stress can make weight loss difficult. Exercising is one way to help with stress, but meditation using the CALM Monet or another comparable alternative can be done in your home or place of work with little time commitment. This Monet can also help work on behavior change and improve sleep. Check out the segment under Calm Masterclass and listen to The 4 Pillars of Health. A great way to begin learning about the foundation of lifestyle with practical tips to use in your every day. Check out www.yourweightmatters. org blog for continued daily support and education along this weight loss journey! Patient Resources:     Personal Training/Fitness Classes/Health Coaching     Jaime Emanuel and Murry Sophiaside @ [email or web address] Full fitness center with group fitness and personal training. Discount available as client of [facility]. Health Coaching and Personal Training with Abhishek Carroll at our [facility]- individual weekly coaching with option to add personal training and small group fitness classes targeted at weight loss- [phone] and/or email @ [email or web address]  360[employer]IT [place] [email or web address] Group Fitness [phone] and/or email Rosanne Good at [email or web address]  [address] [facility] with multiple locations: Aetna (www.Travora Networks), Eat The Owned it (www.avox. myJambi), Fit Body Bootcamp (www.Precyse Technologiesp.myJambi), VertiFlex (www.Health Global Connect), The Exercise  (www.exercisecoach.myJambi)     Online Fitness  Fitness  on [employer] in 10 DVD series- www. vwvbj10HPU. myJambi  Sit and Be Fit - Chair exercise series Www.sitandbefit. org  Hip Hop Fit with Melvin Carter at www.hiphopfit. net     Apps for on the Traction 7 Minute Workout (orange box with white 7) - free on the go HIIT training monet  Peloton Monet @ www. [place]     Nutrition Trackers and Tools  LoseIT! And My Fitness Pal apps and on line for tracking nutrition  NOOM - virtual health coaching  FitFoundation (healthy meals on the go) in [facility] @ www. xlnwyjubvstfc1d. Gerry Hewitt MD @ www.Metroview CapitaldCogentus Pharm[employer] and Gutierrez Duron (keto and low carb plans recommended) @ www. RTHSAQ39.K, Metabolic Meals @ www. CricHQMetabolicMeals. com - individual prepared meals to go  StartupMojo, RIO Brands, International [employer] Machines, Every Plate, [place]- on line meal delivery programs for preparation at home  [place] [employer]achting in [place] for homemade meals to go @ wwwBeachhead Exports USA  Diet Doctor @ www. dietdoctor. Beaumaris Networks - low carb swaps  Yummly - meal prep and planning monet (www.yummly. com)     Stress Management/Behavior/Mindful Eating  CALM meditation monet (wwwSilicon Frontline Technology)  Headspace  Am I Hungry? Mindful eating virtual  monet  Www.yourweightmatters. org - Obesity Action Coalition sponsored Blog posts daily  Motivation monet (black box with white \")- daily supportive messages sent to your phone     Books/Video Education/Podcasts  Mindless Eating by Erick Hogan  Why We Get Sick by Salena Ellington (a book about insulin resistance)  Atomic Habits by Matthew Knight (a book about taking small steps to promote greater behavior change)   Can't Hurt Me by Tash Martinez (a book exploring the power of discipline in achieving your goals)  The End of Dieting: How to Live for Life by Dr. Leisa Manzo M.D. or listen to The [facility] Episode 61: Understanding \"Nutritarian\" Eating w/Dr. Leisa Manzo  Your Body in Balance: The World [employer] of Food, Hormones, and Health by Dr. Hermilo Little  The Menopause Diet Plan by Daniela Christensen and [facility]  The Complete Guide to fasting by Dr. Vanesa Brown, [address] [facility] by Mireya Stone, Ph.D, R.D.   Weight Loss Surgery Will Not Treat Food Addiction by Rozina Madera Ph.D  The Game Changers- Netflix Documentary on plant based nutrition  Fed Up - documentary about obesity (Free on Utube)  The Truth About Sugar - documentary on sugar (Free on Utube, https://youtu. be/0F2gvsnST7l)  The Dr. Islas Minium by Dr. Radha Mg MD  Fitlosophy Fitspiration - journal to better health (found at [employer] in fitness aisle)  What Happened to You?- a look at the impact trauma has on behavior written by Lori Johnston and Dr. Jessica Lopez Again by Dilia Grande - discovering your true self after trauma  Oddis Sprain talk on RehabDev, The Call to Courage  Podcasts: The Exam Room by the Physician's Committee, Nutrition Facts by Dr. Harman Lopez    We are here to support you with weight loss, but please remember that you still need your primary care provider for your routine health maintenance.

## 2023-07-25 ENCOUNTER — TELEMEDICINE (OUTPATIENT)
Dept: INTERNAL MEDICINE CLINIC | Facility: CLINIC | Age: 58
End: 2023-07-25
Payer: COMMERCIAL

## 2023-07-25 DIAGNOSIS — I10 ESSENTIAL HYPERTENSION: ICD-10-CM

## 2023-07-25 DIAGNOSIS — R63.2 BINGE EATING: Primary | ICD-10-CM

## 2023-07-25 DIAGNOSIS — E03.9 HYPOTHYROIDISM, UNSPECIFIED TYPE: ICD-10-CM

## 2023-07-25 DIAGNOSIS — E78.2 MIXED HYPERLIPIDEMIA: ICD-10-CM

## 2023-07-25 DIAGNOSIS — R73.01 IFG (IMPAIRED FASTING GLUCOSE): ICD-10-CM

## 2023-07-25 PROCEDURE — 99214 OFFICE O/P EST MOD 30 MIN: CPT | Performed by: INTERNAL MEDICINE

## 2023-07-25 NOTE — PROGRESS NOTES
[facility] WEIGHT MANAGEMENT VIRTUAL ENCOUNTER     Warren Webber verbally consents to a Virtual/Telephone Check-In service on 07/07/23   Patient understands and accepts financial responsibility for any deductible, co-insurance and/or co-pays associated with this service. HISTORY OF PRESENT ILLNESS  Patient presents with:  Weight Check: Video      Warren Webber is a 65year old female is being evaluated as a video visit using Telemedicine with live, interactive video and audio    Weight gain/loss since LOV based on home monitoring:   Saw Zeina Gustafson on 7/7   Was given injections on the wegovy   Was recommended to start in a few weeks for the shortage. Did the sample and was told it would not be covered. Most recent weight: 240.6     Walking every morning, every 35 minutes and on 3 bike rides for 30 minutes         Denies chest pain, shortness of breath, dizziness, blurred vision, headache, paresthesia, nausea/vomiting.      Wt Readings from Last 6 Encounters:  06/05/23 : 237 lb (107.5 kg)  12/28/22 : 237 lb (107.5 kg)  12/08/22 : 234 lb (106.1 kg)  08/09/22 : 244 lb (110.7 kg)  08/08/22 : 242 lb (109.8 kg)  08/08/22 : 242 lb (109.8 kg)         Subjective  REVIEW OF SYSTEMS  GENERAL HEALTH: feels well otherwise, denied any fevers chills or night sweats   RESPIRATORY: denies shortness of breath   CARDIOVASCULAR: denies chest pain  GI: denies abdominal pain  PSYCH: denies any mood changes    Objective  EXAM  Reviewed most recent set of vitals   Physical Exam:  GENERAL: well developed, well nourished, in no apparent distress, speaking in full sentences comfortably   SKIN: warm, pink, dry without rashes to exposed area   EYES: conjunctiva pink  HEENT: atraumatic, normocephalic  LUNGS: normal work of breathing, non labored  CARDIO: normal work, no exertion  EXTREMITIES: no cyanosis, no clubbing, no edema  NEURO: Oriented times three  PSYCH: pleasant, cooperative, normal mood and affect    Lab Results Component Value Date    WBC 7.6 07/24/2020    RBC 4.83 07/24/2020    HGB 13.8 07/24/2020    HCT 44.0 07/24/2020    MCV 91.1 07/24/2020    MCH 28.6 07/24/2020    MCHC 31.4 07/24/2020    RDW 12.5 07/24/2020    .0 07/24/2020     Lab Results   Component Value Date    GLU 85 07/24/2020    BUN 18 07/24/2020    BUNCREA 18.4 07/24/2020    CREATSERUM 0.98 07/24/2020    ANIONGAP 2 07/24/2020    GFR 90 03/04/2017    GFRNAA 66 07/24/2020    GFRAA 76 07/24/2020    CA 9.8 07/24/2020    OSMOCALC 289 07/24/2020    ALKPHO 80 07/24/2020    AST 17 07/24/2020    ALT 28 07/24/2020    BILT 0.7 07/24/2020    TP 7.9 07/24/2020    ALB 3.8 07/24/2020    GLOBULIN 4.1 07/24/2020     07/24/2020    K 3.7 07/24/2020     07/24/2020    CO2 29.0 07/24/2020     Lab Results   Component Value Date     07/24/2020    A1C 5.6 07/24/2020     Lab Results   Component Value Date    CHOLEST 214 (H) 07/24/2020    TRIG 83 07/24/2020    HDL 58 07/24/2020     (H) 07/24/2020    VLDL 17 07/24/2020    TCHDLRATIO 2.67 02/08/2018    NONHDLC 156 (H) 07/24/2020     Lab Results   Component Value Date    T4F 1.4 03/04/2017    TSH 2.270 07/24/2020     Lab Results   Component Value Date    B12 424 07/24/2020     Lab Results   Component Value Date    VITD 42.9 07/24/2020       Phentermine HCl 37.5 MG Oral Tab, Take 1 tablet (37.5 mg total) by mouth every morning before breakfast., Disp: 30 tablet, Rfl: 2  Levothyroxine Sodium (SYNTHROID) 88 MCG Oral Tab, Take 1 tablet (88 mcg total) by mouth before breakfast., Disp: 90 tablet, Rfl: 0    ipratropium-albuterol (DUONEB) nebulizer solution 3 mL, 3 mL, Nebulization, Once, [place], [place], APRN        ASSESSMENT  Analyzed weight data:       Diagnoses and all orders for this visit:    Binge eating  -     COMP METABOLIC PANEL (14); Future  -     HEMOGLOBIN A1C; Future  -     B12 AND FOLATE; Future  -     TSH+FREE T4; Future  -     VITAMIN D; Future  -     CBC WITH DIFFERENTIAL WITH PLATELET;  Future  - LIPID PANEL; Future  -     LEPTIN, SERUM; Future    IFG (impaired fasting glucose)  -     COMP METABOLIC PANEL (14); Future  -     HEMOGLOBIN A1C; Future  -     B12 AND FOLATE; Future  -     TSH+FREE T4; Future  -     VITAMIN D; Future  -     CBC WITH DIFFERENTIAL WITH PLATELET; Future  -     LIPID PANEL; Future  -     LEPTIN, SERUM; Future    Hypothyroidism, unspecified type  -     COMP METABOLIC PANEL (14); Future  -     HEMOGLOBIN A1C; Future  -     B12 AND FOLATE; Future  -     TSH+FREE T4; Future  -     VITAMIN D; Future  -     CBC WITH DIFFERENTIAL WITH PLATELET; Future  -     LIPID PANEL; Future  -     LEPTIN, SERUM; Future    Essential hypertension  -     COMP METABOLIC PANEL (14); Future  -     HEMOGLOBIN A1C; Future  -     B12 AND FOLATE; Future  -     TSH+FREE T4; Future  -     VITAMIN D; Future  -     CBC WITH DIFFERENTIAL WITH PLATELET; Future  -     LIPID PANEL; Future  -     LEPTIN, SERUM; Future    Mixed hyperlipidemia  -     LEPTIN, SERUM; Future        PLAN  Continue with medications: phentermine 37.5mg currently on half tablet   Total time spent on chart review, pre-charting, obtaining history, counseling, and educating, reviewing labs was 30 minutes. GBWEJQ not covered   Start ozempic once labwork completed  Contradictions: has done vyvanse, belviq, saxenda, qsymia, metformin, VSL #3, topamax, diethylpropion, ozempic    Due for labwork   HTN  blood pressure is in office is stable - continue present management  HLD  stable on current medication regimen, continue to follow-up with PCP  Hypothyroid- stable, continue with current medication regimen, managed by PCP   Encouraged to start exercising and add in strength training   Advised to monitor blood pressure and pulse at home/ given parameters to review and contact provider. Nutrition: low carb diet/ recommended to eat breakfast daily/ regular protein intake  Follow up with dietitian and psychologist as recommended.   Discussed the role of sleep and stress in weight management. Counseled on comprehensive weight loss plan including attention to nutrition, exercise and behavior/stress management for success. See patient instruction below for more details. Discussed strategies to overcome barriers to successful weight loss and weight maintenance  FITTE: ACSM recommendations (150-300 minutes/ week in active weight loss)       There are no Patient Instructions on file for this visit. No follow-ups on file. Patient verbalizes understanding. Total time spent on chart review, pre-charting, obtaining history, counseling, and educating, reviewing labs was 24 minutes. Pt understands phone/video evaluation is not a substitute for face to face examination or emergency care. Pt advised to go to the ER or call 911 for worsening symptoms or acute distress. Please note that the following visit was completed using two-way, real-time interactive audio and/or video communication. This has been done in good moreno to provide continuity of care in the best interest of the provider-patient relationship, due to the ongoing public health crisis/national emergency and because of restrictions of visitation. There are limitations of this visit as no physical exam could be performed. Every conscious effort was taken to allow for sufficient and adequate time. This billing was spent on reviewing labs, medications, radiology tests and decision making. Appropriate medical decision-making and tests are ordered as detailed in the plan of care above. NOTE TO PATIENT: The 21st Century Cures Act makes clinical notes like these available to patients in the interest of transparency. Clinical notes are medical documents used by physicians and care providers to communicate with each other. These documents include medical language and terminology, abbreviations, and treatment information that may sound technical and at times possibly unfamiliar.  In addition, at times, the verbiage may appear blunt or direct. These documents are one tool providers use to communicate relevant information and clinical opinions of the care providers in a way that allows common understanding of the clinical context.

## 2023-08-07 ENCOUNTER — OFFICE VISIT (OUTPATIENT)
Dept: INTERNAL MEDICINE CLINIC | Facility: CLINIC | Age: 58
End: 2023-08-07
Payer: COMMERCIAL

## 2023-08-07 VITALS
HEIGHT: 67 IN | DIASTOLIC BLOOD PRESSURE: 84 MMHG | BODY MASS INDEX: 38.14 KG/M2 | SYSTOLIC BLOOD PRESSURE: 130 MMHG | HEART RATE: 76 BPM | WEIGHT: 243 LBS | RESPIRATION RATE: 16 BRPM

## 2023-08-07 DIAGNOSIS — E66.9 OBESITY (BMI 30-39.9): ICD-10-CM

## 2023-08-07 DIAGNOSIS — E03.9 HYPOTHYROIDISM, UNSPECIFIED TYPE: ICD-10-CM

## 2023-08-07 DIAGNOSIS — Z51.81 ENCOUNTER FOR THERAPEUTIC DRUG MONITORING: Primary | ICD-10-CM

## 2023-08-07 DIAGNOSIS — I10 ESSENTIAL HYPERTENSION: ICD-10-CM

## 2023-08-07 DIAGNOSIS — R73.01 IFG (IMPAIRED FASTING GLUCOSE): ICD-10-CM

## 2023-08-07 DIAGNOSIS — E78.2 MIXED HYPERLIPIDEMIA: ICD-10-CM

## 2023-08-07 DIAGNOSIS — R63.2 BINGE EATING: ICD-10-CM

## 2023-08-07 PROCEDURE — 3075F SYST BP GE 130 - 139MM HG: CPT | Performed by: NURSE PRACTITIONER

## 2023-08-07 PROCEDURE — 99213 OFFICE O/P EST LOW 20 MIN: CPT | Performed by: NURSE PRACTITIONER

## 2023-08-07 PROCEDURE — 3079F DIAST BP 80-89 MM HG: CPT | Performed by: NURSE PRACTITIONER

## 2023-08-07 PROCEDURE — 3008F BODY MASS INDEX DOCD: CPT | Performed by: NURSE PRACTITIONER

## 2023-08-07 NOTE — PATIENT INSTRUCTIONS
Next steps:  1. Fill your prescribed medication and take as discussed and prescribed: phentermine (can increase the dose)  2. Schedule a personal nutrition consultation with one of our registered dieticians     Please try to work on the following dietary changes:  Daily protein recommendation to start:   grams  Daily carbohydrate: <120g  Daily calories: 1,500-1,600  1. Drink water with meals and throughout the day, cut down on soda and/or juice if consumed. Consider flavored water options like Bubbly, Spindrift, Hint and Sandra. 2.  Eat breakfast daily and focus on having protein with each meal, examples include: greek yogurt, cottage cheese, hard boiled egg, whole grain toast with peanut butter. 3.  Reduce refined carbohydrates and sugars which includes items such as sweets, as well as rice, pasta, and bread and make sure to choose whole grain options when having them with just 1 serving per meal about the size of your inner palm. 4.  Consume non starchy veggies daily working towards making them a good 50% of your daily food intake. Add them to lunch and dinner consistently. 5.  Start a daily probiotic: VSL#3 is recommended, (order on line at www.vsl3. com). Take 1 capsule daily with water for 30 days, then reduce to 1 every other day (this will reduce the cost). Capsules can be left out for 2 weeks, but then must be refrigerated. Please download tomás [employer] Gino Otero! Or Net Diary to monitor daily dietary intake and you will be able to see if you are eating the right amount of calories or too much or too little which would hinder weight loss. Additionally this will help to see your daily carbohydrate and protein intake. When you set the tomás up choose 1-2 lbs/week as a goal.  Keeping a paper food journal is an option as well to remain accountable for your choices- this is the start to mindful eating! A low calorie diet has been consistently shown to support weight loss.      Continue or start exercising to help establish a routine. If not already exercising begin with 1 day and progress as able with long-term goal of 30 minutes 5 days a week at a minimum. Meditation daily can help manage and control stress. Chronic stress can make weight loss difficult. Exercising is one way to help with stress, but meditation using the CALM Monet or another comparable alternative can be done in your home or place of work with little time commitment. This Monet can also help work on behavior change and improve sleep. Check out the segment under Calm Masterclass and listen to The 4 Pillars of Health. A great way to begin learning about the foundation of lifestyle with practical tips to use in your every day. Check out www.yourweightmatters. org blog for continued daily support and education along this weight loss journey! Patient Resources:     Personal Training/Fitness Classes/Health Coaching     Jaime Emanuel and Murry Sophiaside @ [email or web address] Full fitness center with group fitness and personal training. Discount available as client of [facility]. Health Coaching and Personal Training with Yo Penny at our [facility]- individual weekly coaching with option to add personal training and small group fitness classes targeted at weight loss- [phone] and/or email @ [email or web address]  360FIT Dirk [email or web address] Group Fitness [phone] and/or email Aleksey Calderón at [email or web address] Kids  [address] [place] [facility] with multiple locations: [employer]et[employer] (www.PulmOne. Peek Kids), Eat The Frog Fitness (www.KIHEITAI. Peek Kids), Fit Body Bootcamp (www.Bliipsbodybootcamp.Peek Kids), Pixplit Fitness (www.Monkeysee. Peek Kids), The Exercise  (www.exercisecoach.Peek Kids)     Online Fitness  Fitness  on [employer] in 10 DVD series- www. rcnud00DSI. Peek Kids  Sit and Be Fit - Chair exercise series Www.sitandbefit. org  Hip Hop Fit with Melvin Carter at www.hiphopfit. net     Apps for on the [place] Outdoor 7 Minute Workout (orange box with white 7) - free on the go HIIT training monet  Peloton Monet @ wwwCorporama     Nutrition Trackers and Tools  LoseIT! And My Fitness Pal apps and on line for tracking nutrition  NOOM - virtual health coaching  FitFoundation (healthy meals on the go) in [facility] @ www. blhaicysedfld7q. Karen Aquino MD @ www.CellcatromdAirTouch Communications and Sania Hallman (keto and low carb plans recommended) @ www. [email or web address].QCP, Metabolic Meals @ www. Skyline International DevelopmentabolicMeals. com - individual prepared meals to go  En[employer]son, Cycle, [employer], Every Plate, Zairge- on line meal delivery programs for preparation at home  AK Vonvo.com in [place] for homemade meals to go @ wwwFlexEl. TabbedOut  Diet Doctor @ www. dietdoctor. com - low carb swaps  Yummly - meal prep and planning monet (www.yummly. com)     Stress Management/Behavior/Mindful Eating  CALM meditation monet (www.memloom. TabbedOut)  Headspace  Am I Hungry? Mindful eating virtual  monet  Www.yourweightmatters. org - Obesity Action Coalition sponsored Blog posts daily  Motivation monet (black box with white \")- daily supportive messages sent to your phone     Books/Video Education/Podcasts  Mindless Eating by Raysa Lisa  Why We Get Sick by Jacoby Dunn (a book about insulin resistance)  Atomic Habits by Jarrett Lira (a book about taking small steps to promote greater behavior change)   Can't Hurt Me by Jr Saba (a book exploring the power of discipline in achieving your goals)  The End of Dieting: How to Live for Life by Dr. Manoj Jj M.D. or listen to The [address] Episode 61: Understanding \"Nutritarian\" Eating w/Dr. Manoj Jj  Your Body in Balance:  The [employer] of Food, Hormones, and Health by Dr. Dayanna Wilson  The Menopause Diet Plan by Neida Otto and [facility]  The Complete Guide to fasting by Dr. Bunny Nunez, [address] [facility] by Jemima Ward Sage Barker, Ph.D, R.D. Weight Loss Surgery Will Not Treat Food Addiction by Demetra Infante Ph.D  The [address] [place] [address] on plant based nutrition  Fed Up - documentary about obesity (Free on [employer]town)  The Truth About Sugar - documentary on sugar (Free on [employer], https://youtu. be/1X8dtztJE0u)  The Dr. Love Nail by Dr. Fede Lusi MD  Fitlosophy Fitspiration - journal to better health (found at [employer] in fitness aisle)  What Happened to You?- a look at the impact trauma has on behavior written by Prieto Dennison and Dr. Zeinab Blackburn Again by Damon Brunner - discovering your true self after trauma  Marcus Hernandez talk on Cloud9 IDE, The Call to [employer]  Podcasts: The Exam Room by the Physician's Committee, Nutrition Facts by Dr. Lonell Claude    We are here to support you with weight loss, but please remember that you still need your primary care provider for your routine health maintenance.

## 2023-10-13 ENCOUNTER — TELEPHONE (OUTPATIENT)
Dept: INTERNAL MEDICINE CLINIC | Facility: CLINIC | Age: 58
End: 2023-10-13

## 2023-10-13 DIAGNOSIS — Z51.81 ENCOUNTER FOR THERAPEUTIC DRUG MONITORING: Primary | ICD-10-CM

## 2023-10-13 DIAGNOSIS — I10 ESSENTIAL HYPERTENSION: ICD-10-CM

## 2023-10-13 DIAGNOSIS — E66.9 OBESITY (BMI 30-39.9): ICD-10-CM

## 2023-10-13 DIAGNOSIS — R63.2 BINGE EATING: ICD-10-CM

## 2023-10-13 DIAGNOSIS — E78.2 MIXED HYPERLIPIDEMIA: ICD-10-CM

## 2023-10-19 ENCOUNTER — ORDER TRANSCRIPTION (OUTPATIENT)
Dept: ADMINISTRATIVE | Facility: HOSPITAL | Age: 58
End: 2023-10-19

## 2023-10-19 DIAGNOSIS — E78.2 MIXED HYPERLIPIDEMIA: ICD-10-CM

## 2023-10-19 DIAGNOSIS — R63.2 BINGE EATING: ICD-10-CM

## 2023-10-19 DIAGNOSIS — E66.9 OBESITY: Primary | ICD-10-CM

## 2023-10-19 DIAGNOSIS — I10 ESSENTIAL HYPERTENSION: ICD-10-CM

## 2023-10-26 ENCOUNTER — OFFICE VISIT (OUTPATIENT)
Dept: INTERNAL MEDICINE CLINIC | Facility: CLINIC | Age: 58
End: 2023-10-26
Payer: COMMERCIAL

## 2023-10-26 VITALS
WEIGHT: 232 LBS | RESPIRATION RATE: 16 BRPM | SYSTOLIC BLOOD PRESSURE: 120 MMHG | HEIGHT: 65.5 IN | BODY MASS INDEX: 38.19 KG/M2 | HEART RATE: 78 BPM | DIASTOLIC BLOOD PRESSURE: 80 MMHG

## 2023-10-26 DIAGNOSIS — R63.2 BINGE EATING: ICD-10-CM

## 2023-10-26 DIAGNOSIS — I10 ESSENTIAL HYPERTENSION: ICD-10-CM

## 2023-10-26 DIAGNOSIS — E66.9 OBESITY (BMI 30-39.9): ICD-10-CM

## 2023-10-26 DIAGNOSIS — E78.2 MIXED HYPERLIPIDEMIA: ICD-10-CM

## 2023-10-26 DIAGNOSIS — E03.9 HYPOTHYROIDISM, UNSPECIFIED TYPE: ICD-10-CM

## 2023-10-26 DIAGNOSIS — Z51.81 ENCOUNTER FOR THERAPEUTIC DRUG MONITORING: Primary | ICD-10-CM

## 2023-10-26 DIAGNOSIS — R73.01 IFG (IMPAIRED FASTING GLUCOSE): ICD-10-CM

## 2023-10-26 PROCEDURE — 97802 MEDICAL NUTRITION INDIV IN: CPT

## 2023-10-26 PROCEDURE — 3079F DIAST BP 80-89 MM HG: CPT | Performed by: INTERNAL MEDICINE

## 2023-10-26 PROCEDURE — 3008F BODY MASS INDEX DOCD: CPT | Performed by: INTERNAL MEDICINE

## 2023-10-26 PROCEDURE — 3074F SYST BP LT 130 MM HG: CPT | Performed by: INTERNAL MEDICINE

## 2023-10-26 PROCEDURE — 99214 OFFICE O/P EST MOD 30 MIN: CPT | Performed by: INTERNAL MEDICINE

## 2023-10-26 RX ORDER — CHLORTHALIDONE 25 MG/1
25 TABLET ORAL DAILY
COMMUNITY
Start: 2023-09-11

## 2023-10-26 NOTE — PROGRESS NOTES
HISTORY OF PRESENT ILLNESS  Patient presents with:  Weight Check: Down 11 lb    Misti Ramos is a 63year old female is being evaluated as a video visit using Telemedicine with live, interactive video and audio    Down 11 lb from previous   Reviewed labwork from [address] [place] [address]  Did reviewed labs with patient. [place] that she tolerated the ozempic better then the wegovy   Has been exercising every day. Struggling with with the exercise.          Wt Readings from Last 6 Encounters:  10/26/23 : 232 lb (105.2 kg)  08/07/23 : 243 lb (110.2 kg)  06/05/23 : 237 lb (107.5 kg)  12/28/22 : 237 lb (107.5 kg)  12/08/22 : 234 lb (106.1 kg)  08/09/22 : 244 lb (110.7 kg)         Subjective  REVIEW OF SYSTEMS  GENERAL HEALTH: feels well otherwise, denied any fevers chills or night sweats   RESPIRATORY: denies shortness of breath   CARDIOVASCULAR: denies chest pain  GI: denies abdominal pain  PSYCH: denies any mood changes    Objective  EXAM  Reviewed most recent set of vitals   Physical Exam:  GENERAL: well developed, well nourished, in no apparent distress, speaking in full sentences comfortably   SKIN: warm, pink, dry without rashes to exposed area   EYES: conjunctiva pink  HEENT: atraumatic, normocephalic  LUNGS: normal work of breathing, non labored  CARDIO: normal work, no exertion  EXTREMITIES: no cyanosis, no clubbing, no edema  NEURO: Oriented times three  PSYCH: pleasant, cooperative, normal mood and affect    Lab Results   Component Value Date    WBC 7.6 07/24/2020    RBC 4.83 07/24/2020    HGB 13.8 07/24/2020    HCT 44.0 07/24/2020    MCV 91.1 07/24/2020    MCH 28.6 07/24/2020    MCHC 31.4 07/24/2020    RDW 12.5 07/24/2020    .0 07/24/2020     Lab Results   Component Value Date    GLU 85 07/24/2020    BUN 18 07/24/2020    BUNCREA 18.4 07/24/2020    CREATSERUM 0.98 07/24/2020    ANIONGAP 2 07/24/2020    GFR 90 03/04/2017    GFRNAA 66 07/24/2020    GFRAA 76 07/24/2020    CA 9.8 07/24/2020    Kd 498 800 07/24/2020    ALKPHO 80 07/24/2020    AST 17 07/24/2020    ALT 28 07/24/2020    BILT 0.7 07/24/2020    TP 7.9 07/24/2020    ALB 3.8 07/24/2020    GLOBULIN 4.1 07/24/2020     07/24/2020    K 3.7 07/24/2020     07/24/2020    CO2 29.0 07/24/2020     Lab Results   Component Value Date     07/24/2020    A1C 5.6 07/24/2020     Lab Results   Component Value Date    CHOLEST 214 (H) 07/24/2020    TRIG 83 07/24/2020    HDL 58 07/24/2020     (H) 07/24/2020    VLDL 17 07/24/2020    TCHDLRATIO 2.67 02/08/2018    NONHDLC 156 (H) 07/24/2020     Lab Results   Component Value Date    T4F 1.4 03/04/2017    TSH 2.270 07/24/2020     Lab Results   Component Value Date    B12 424 07/24/2020     Lab Results   Component Value Date    VITD 42.9 07/24/2020       chlorthalidone 25 MG Oral Tab, Take 1 tablet (25 mg total) by mouth daily. , Disp: , Rfl:   Phentermine HCl 37.5 MG Oral Tab, Take 1 tablet (37.5 mg total) by mouth every morning before breakfast., Disp: 30 tablet, Rfl: 2  Levothyroxine Sodium (SYNTHROID) 88 MCG Oral Tab, Take 1 tablet (88 mcg total) by mouth before breakfast., Disp: 90 tablet, Rfl: 0    ipratropium-albuterol (DUONEB) nebulizer solution 3 mL, 3 mL, Nebulization, Once, ARAM Diaz        ASSESSMENT  Analyzed weight data:       Diagnoses and all orders for this visit:    Encounter for therapeutic drug monitoring    Obesity (BMI 30-39. 9)    IFG (impaired fasting glucose)    Hypothyroidism, unspecified type    Essential hypertension    Other orders  -     semaglutide (OZEMPIC, 0.25 OR 0.5 MG/DOSE,) 2 MG/3ML Subcutaneous Solution Pen-injector; 0.25mg q 2 weeks then increase to 0.5 mg q weekly  -     Phentermine HCl 37.5 MG Oral Tab;  Take 1 tablet (37.5 mg total) by mouth every morning before breakfast.          PLAN  Continue with medications: phentermine 37.5mg currently   Total time spent on chart review, pre-charting, obtaining history, counseling, and educating, reviewing labs was 27 minutes. TCYZOT not covered   Add ozempic   -advised of side effects and adverse effects of this medication    Contradictions: has done vyvanse, belviq, saxenda, qsymia, metformin, VSL #3, topamax, diethylpropion, ozempic      HTN  blood pressure is in office is stable - continue present management  HLD  stable on current medication regimen, continue to follow-up with PCP  Hypothyroid- stable, continue with current medication regimen, managed by PCP   Encouraged to start exercising and add in strength training  Nutrition: low carb diet/ recommended to eat breakfast daily/ regular protein intake  Follow up with dietitian and psychologist as recommended. Discussed the role of sleep and stress in weight management. Counseled on comprehensive weight loss plan including attention to nutrition, exercise and behavior/stress management for success. See patient instruction below for more details. Discussed strategies to overcome barriers to successful weight loss and weight maintenance  FITTE: ACSM recommendations (150-300 minutes/ week in active weight loss)       There are no Patient Instructions on file for this visit. No follow-ups on file. Patient verbalizes understanding.

## 2023-10-27 RX ORDER — PHENTERMINE HYDROCHLORIDE 37.5 MG/1
37.5 TABLET ORAL
Qty: 30 TABLET | Refills: 2 | Status: SHIPPED | OUTPATIENT
Start: 2023-10-27

## 2023-10-27 RX ORDER — SEMAGLUTIDE 0.68 MG/ML
INJECTION, SOLUTION SUBCUTANEOUS
Qty: 1 EACH | Refills: 1 | Status: SHIPPED | OUTPATIENT
Start: 2023-10-27

## 2023-11-12 ENCOUNTER — HOSPITAL ENCOUNTER (OUTPATIENT)
Dept: CT IMAGING | Age: 58
Discharge: HOME | End: 2023-11-12
Attending: INTERNAL MEDICINE

## 2023-11-12 DIAGNOSIS — Z13.6 SCREENING FOR CARDIOVASCULAR CONDITION: ICD-10-CM

## 2023-11-21 ENCOUNTER — OFFICE VISIT (OUTPATIENT)
Dept: INTERNAL MEDICINE CLINIC | Facility: CLINIC | Age: 58
End: 2023-11-21
Payer: COMMERCIAL

## 2023-11-21 VITALS
HEART RATE: 70 BPM | SYSTOLIC BLOOD PRESSURE: 124 MMHG | RESPIRATION RATE: 16 BRPM | DIASTOLIC BLOOD PRESSURE: 82 MMHG | BODY MASS INDEX: 37.86 KG/M2 | HEIGHT: 65.5 IN | WEIGHT: 230 LBS

## 2023-11-21 DIAGNOSIS — I10 ESSENTIAL HYPERTENSION: ICD-10-CM

## 2023-11-21 DIAGNOSIS — E78.2 MIXED HYPERLIPIDEMIA: ICD-10-CM

## 2023-11-21 DIAGNOSIS — Z51.81 ENCOUNTER FOR THERAPEUTIC DRUG MONITORING: Primary | ICD-10-CM

## 2023-11-21 DIAGNOSIS — E66.9 OBESITY (BMI 30-39.9): ICD-10-CM

## 2023-11-21 PROCEDURE — 3079F DIAST BP 80-89 MM HG: CPT | Performed by: INTERNAL MEDICINE

## 2023-11-21 PROCEDURE — 3008F BODY MASS INDEX DOCD: CPT | Performed by: INTERNAL MEDICINE

## 2023-11-21 PROCEDURE — 99214 OFFICE O/P EST MOD 30 MIN: CPT | Performed by: INTERNAL MEDICINE

## 2023-11-21 PROCEDURE — 3074F SYST BP LT 130 MM HG: CPT | Performed by: INTERNAL MEDICINE

## 2023-11-21 NOTE — PROGRESS NOTES
HISTORY OF PRESENT ILLNESS  Chief Complaint   Patient presents with    Weight Check     -2     Hal James is a 63year old female is being evaluated as a video visit using Telemedicine with live, interactive video and audio  Down 2 lb from previous   Retired and switched insurance and yet to start new ozempic. Yet to start medication, considering zepbound   Met with cardiology prior to long-term and had testing   Had ultrasound and calcium score   Had  full clearance to continue medication   Saw Dr. Breanna Montes De Oca   Also followed up with endocrine     Walks to and from the exercise center but is not losing weight at that rate. Does weigh in every day and does graph her weight loss.        Wt Readings from Last 6 Encounters:   11/21/23 230 lb (104.3 kg)   10/26/23 232 lb (105.2 kg)   08/07/23 243 lb (110.2 kg)   06/05/23 237 lb (107.5 kg)   12/28/22 237 lb (107.5 kg)   12/08/22 234 lb (106.1 kg)          Subjective  REVIEW OF SYSTEMS  GENERAL HEALTH: feels well otherwise, denied any fevers chills or night sweats   RESPIRATORY: denies shortness of breath   CARDIOVASCULAR: denies chest pain  GI: denies abdominal pain  PSYCH: denies any mood changes      Objective  EXAM  Reviewed most recent set of vitals   Physical Exam:  GENERAL: well developed, well nourished, in no apparent distress, speaking in full sentences comfortably   SKIN: warm, pink, dry without rashes to exposed area   EYES: conjunctiva pink  HEENT: atraumatic, normocephalic  LUNGS: normal work of breathing, non labored  CARDIO: normal work, no exertion  EXTREMITIES: no cyanosis, no clubbing, no edema  NEURO: Oriented times three  PSYCH: pleasant, cooperative, normal mood and affect    Lab Results   Component Value Date    WBC 7.6 07/24/2020    RBC 4.83 07/24/2020    HGB 13.8 07/24/2020    HCT 44.0 07/24/2020    MCV 91.1 07/24/2020    MCH 28.6 07/24/2020    MCHC 31.4 07/24/2020    RDW 12.5 07/24/2020    .0 07/24/2020     Lab Results Component Value Date    GLU 85 07/24/2020    BUN 18 07/24/2020    BUNCREA 18.4 07/24/2020    CREATSERUM 0.98 07/24/2020    ANIONGAP 2 07/24/2020    GFR 90 03/04/2017    GFRNAA 66 07/24/2020    GFRAA 76 07/24/2020    CA 9.8 07/24/2020    OSMOCALC 289 07/24/2020    ALKPHO 80 07/24/2020    AST 17 07/24/2020    ALT 28 07/24/2020    BILT 0.7 07/24/2020    TP 7.9 07/24/2020    ALB 3.8 07/24/2020    GLOBULIN 4.1 07/24/2020     07/24/2020    K 3.7 07/24/2020     07/24/2020    CO2 29.0 07/24/2020     Lab Results   Component Value Date     07/24/2020    A1C 5.6 07/24/2020     Lab Results   Component Value Date    CHOLEST 214 (H) 07/24/2020    TRIG 83 07/24/2020    HDL 58 07/24/2020     (H) 07/24/2020    VLDL 17 07/24/2020    TCHDLRATIO 2.67 02/08/2018    NONHDLC 156 (H) 07/24/2020     Lab Results   Component Value Date    T4F 1.4 03/04/2017    TSH 2.270 07/24/2020     Lab Results   Component Value Date    B12 424 07/24/2020     Lab Results   Component Value Date    VITD 42.9 07/24/2020       Current Outpatient Medications on File Prior to Visit   Medication Sig Dispense Refill    semaglutide (OZEMPIC, 0.25 OR 0.5 MG/DOSE,) 2 MG/3ML Subcutaneous Solution Pen-injector 0.25mg q 2 weeks then increase to 0.5 mg q weekly 1 each 1    Phentermine HCl 37.5 MG Oral Tab Take 1 tablet (37.5 mg total) by mouth every morning before breakfast. 30 tablet 2    chlorthalidone 25 MG Oral Tab Take 1 tablet (25 mg total) by mouth daily.       Phentermine HCl 37.5 MG Oral Tab Take 1 tablet (37.5 mg total) by mouth every morning before breakfast. 30 tablet 2    Levothyroxine Sodium (SYNTHROID) 88 MCG Oral Tab Take 1 tablet (88 mcg total) by mouth before breakfast. 90 tablet 0     Current Facility-Administered Medications on File Prior to Visit   Medication Dose Route Frequency Provider Last Rate Last Admin    ipratropium-albuterol (DUONEB) nebulizer solution 3 mL  3 mL Nebulization Once Raj Kenyon, APRN ASSESSMENT  Analyzed weight data:       Diagnoses and all orders for this visit:    Encounter for therapeutic drug monitoring    Obesity (BMI 30-39. 9)    Mixed hyperlipidemia    Essential hypertension          PLAN  Continue with medications: phentermine 37.5mg currently   Total time spent on chart review, pre-charting, obtaining history, counseling, and educating, reviewing labs was 30 minutes. AJRUYN not covered   Start  ozempic   -advised of side effects and adverse effects of this medication  Reviewed CV risk reduction  Continue phentermine   -advised of side effects and adverse effects of this medication  Reviewed cardiovascular benefits with ozempic     Contradictions: has done vyvanse, belviq, saxenda, qsymia, metformin, VSL #3, topamax, diethylpropion, ozempic      HTN  blood pressure is in office is stable - continue present management  HLD  stable on current medication regimen, continue to follow-up with PCP  Hypothyroid- stable, continue with current medication regimen, managed by PCP   Encouraged to start exercising and add in strength training  Nutrition: low carb diet/ recommended to eat breakfast daily/ regular protein intake  Follow up with dietitian and psychologist as recommended. Discussed the role of sleep and stress in weight management. Counseled on comprehensive weight loss plan including attention to nutrition, exercise and behavior/stress management for success. See patient instruction below for more details. Discussed strategies to overcome barriers to successful weight loss and weight maintenance  FITTE: ACSM recommendations (150-300 minutes/ week in active weight loss)       There are no Patient Instructions on file for this visit. No follow-ups on file. Patient verbalizes understanding.

## 2023-11-30 ENCOUNTER — TELEPHONE (OUTPATIENT)
Dept: INTERNAL MEDICINE CLINIC | Facility: CLINIC | Age: 58
End: 2023-11-30

## 2023-11-30 RX ORDER — SEMAGLUTIDE 0.68 MG/ML
INJECTION, SOLUTION SUBCUTANEOUS
Qty: 1 EACH | Refills: 1 | Status: SHIPPED | OUTPATIENT
Start: 2023-11-30 | End: 2023-11-30

## 2023-11-30 NOTE — TELEPHONE ENCOUNTER
PA needed for Zepbound 2.5 mg  Entered on CMM  Awaiting decision      Sabine Lightning (Key: [id number])

## 2023-11-30 NOTE — TELEPHONE ENCOUNTER
See note from patient regarding Ozempic  she is not diabetic. How do you want me to proceed?   For pa call [phone]  ID [id number]

## 2023-12-06 NOTE — TELEPHONE ENCOUNTER
[facility] wants us to verify if patient has tried the preferred drugs: Orlistat, Jonathan, Shereen Carnes  Sent fax 12/5/23 at 12:05 pm  We can send back information for their review - if she has not tried - will be denied

## 2023-12-08 NOTE — TELEPHONE ENCOUNTER
Pt has tried nela Snow, phentermine  Need to print each med and send back with note  [facility] printed notice and medications and faxed back  [phone]

## 2023-12-13 RX ORDER — PHENTERMINE AND TOPIRAMATE 15; 92 MG/1; MG/1
1 CAPSULE, EXTENDED RELEASE ORAL DAILY
Qty: 30 CAPSULE | Refills: 0 | Status: SHIPPED | OUTPATIENT
Start: 2023-12-13 | End: 2024-01-12

## 2023-12-13 NOTE — TELEPHONE ENCOUNTER
[facility] still sent denial of coverage of zepbound after we sent notice and copy of meds tried:  saxenda, wegovy, phentermine.   We must appeal if we want to pursue this  Fax received 12/12/23 at 2:27

## 2023-12-13 NOTE — TELEPHONE ENCOUNTER
I called to speak with someone to clarify why this is denied if we sent all the forms and paperwork and copies of meds tried? I was told I have to appeal. I asked to speak to a supervisor. I was given Guerda who said she can see the information and it was read - she is not sure why denied. She put me on hold and is reaching out to pharmacist to see exact reason for denial.      It was denied because she has not tried Qsymia - I said she has tried phentermine and they will not accept that.

## 2023-12-14 ENCOUNTER — TELEPHONE (OUTPATIENT)
Dept: INTERNAL MEDICINE CLINIC | Facility: CLINIC | Age: 58
End: 2023-12-14

## 2023-12-21 ENCOUNTER — OFFICE VISIT (OUTPATIENT)
Dept: INTERNAL MEDICINE CLINIC | Facility: CLINIC | Age: 58
End: 2023-12-21
Payer: COMMERCIAL

## 2023-12-21 VITALS
SYSTOLIC BLOOD PRESSURE: 120 MMHG | BODY MASS INDEX: 38.02 KG/M2 | RESPIRATION RATE: 18 BRPM | WEIGHT: 231 LBS | HEIGHT: 65.5 IN | OXYGEN SATURATION: 99 % | HEART RATE: 69 BPM | DIASTOLIC BLOOD PRESSURE: 78 MMHG

## 2023-12-21 DIAGNOSIS — E78.2 MIXED HYPERLIPIDEMIA: ICD-10-CM

## 2023-12-21 DIAGNOSIS — I10 ESSENTIAL HYPERTENSION: ICD-10-CM

## 2023-12-21 DIAGNOSIS — R63.2 BINGE EATING: ICD-10-CM

## 2023-12-21 DIAGNOSIS — Z51.81 ENCOUNTER FOR THERAPEUTIC DRUG MONITORING: ICD-10-CM

## 2023-12-21 DIAGNOSIS — E66.9 OBESITY (BMI 30-39.9): Primary | ICD-10-CM

## 2023-12-21 NOTE — PROGRESS NOTES
HISTORY OF PRESENT ILLNESS  Chief Complaint   Patient presents with    Weight Check     Up 1 pounds      Karey Lainez is a 63year old female is being evaluated as a video visit using Telemedicine with live, interactive video and audio  UP 1 lb from previous   No ozempic is not covered and struggling with cardiology   Has concerns around qsymia and high blood pressure and blurry vision   Does struggle with her vision   Is doing a lot of the right things with nutrition and eating   IS walking and rowing and doing elliptical and taking aleve 3 times   Reviewed  24 dietary recall.          Wt Readings from Last 6 Encounters:   12/21/23 231 lb (104.8 kg)   11/21/23 230 lb (104.3 kg)   10/26/23 232 lb (105.2 kg)   08/07/23 243 lb (110.2 kg)   06/05/23 237 lb (107.5 kg)   12/28/22 237 lb (107.5 kg)          Subjective  REVIEW OF SYSTEMS  GENERAL HEALTH: feels well otherwise, denied any fevers chills or night sweats   RESPIRATORY: denies shortness of breath   CARDIOVASCULAR: denies chest pain  GI: denies abdominal pain  PSYCH: denies any mood changes      Objective  EXAM  Reviewed most recent set of vitals   Physical Exam:  GENERAL: well developed, well nourished, in no apparent distress, speaking in full sentences comfortably   SKIN: warm, pink, dry without rashes to exposed area   EYES: conjunctiva pink  HEENT: atraumatic, normocephalic  LUNGS: normal work of breathing, non labored  CARDIO: normal work, no exertion  EXTREMITIES: no cyanosis, no clubbing, no edema  NEURO: Oriented times three  PSYCH: pleasant, cooperative, normal mood and affect    Lab Results   Component Value Date    WBC 7.6 07/24/2020    RBC 4.83 07/24/2020    HGB 13.8 07/24/2020    HCT 44.0 07/24/2020    MCV 91.1 07/24/2020    MCH 28.6 07/24/2020    MCHC 31.4 07/24/2020    RDW 12.5 07/24/2020    .0 07/24/2020     Lab Results   Component Value Date    GLU 85 07/24/2020    BUN 18 07/24/2020    BUNCREA 18.4 07/24/2020    CREATSERUM 0.98 07/24/2020    ANIONGAP 2 07/24/2020    GFR 90 03/04/2017    GFRNAA 66 07/24/2020    GFRAA 76 07/24/2020    CA 9.8 07/24/2020    OSMOCALC 289 07/24/2020    ALKPHO 80 07/24/2020    AST 17 07/24/2020    ALT 28 07/24/2020    BILT 0.7 07/24/2020    TP 7.9 07/24/2020    ALB 3.8 07/24/2020    GLOBULIN 4.1 07/24/2020     07/24/2020    K 3.7 07/24/2020     07/24/2020    CO2 29.0 07/24/2020     Lab Results   Component Value Date     07/24/2020    A1C 5.6 07/24/2020     Lab Results   Component Value Date    CHOLEST 214 (H) 07/24/2020    TRIG 83 07/24/2020    HDL 58 07/24/2020     (H) 07/24/2020    VLDL 17 07/24/2020    TCHDLRATIO 2.67 02/08/2018    NONHDLC 156 (H) 07/24/2020     Lab Results   Component Value Date    T4F 1.4 03/04/2017    TSH 2.270 07/24/2020     Lab Results   Component Value Date    B12 424 07/24/2020     Lab Results   Component Value Date    VITD 42.9 07/24/2020       Current Outpatient Medications on File Prior to Visit   Medication Sig Dispense Refill    chlorthalidone 25 MG Oral Tab Take 1 tablet (25 mg total) by mouth daily. Levothyroxine Sodium (SYNTHROID) 88 MCG Oral Tab Take 1 tablet (88 mcg total) by mouth before breakfast. 90 tablet 0    Phentermine-Topiramate (QSYMIA) 15-92 MG Oral Capsule SR 24 Hr Take 1 tablet by mouth daily. (Patient not taking: Reported on 12/21/2023) 30 capsule 0    Tirzepatide 2.5 MG/0.5ML Subcutaneous Solution Pen-injector Inject 2.5 mg into the skin once a week. ZEPBOUND (Patient not taking: Reported on 12/21/2023) 2 mL 0     Current Facility-Administered Medications on File Prior to Visit   Medication Dose Route Frequency Provider Last Rate Last Admin    ipratropium-albuterol (DUONEB) nebulizer solution 3 mL  3 mL Nebulization Once ARAM Guido           ASSESSMENT  Analyzed weight data:       Diagnoses and all orders for this visit:    Obesity (BMI 30-39. 9)    Encounter for therapeutic drug monitoring    Mixed hyperlipidemia    Essential hypertension    Binge eating          PLAN  Continue with medications: phentermine 37.5mg currently   Total time spent on chart review, pre-charting, obtaining history, counseling, and educating, reviewing labs was 30 minutes. No ozempic coverage  May 2018-June 2018 for [facility] and [facility] [place] are on back order  -advised of side effects and adverse effects of this medication  Contradictions: has done vyvanse, belviq, saxenda, qsymia, metformin, VSL #3, topamax, diethylpropion, ozempic      HTN  blood pressure is in office is stable - continue present management  HLD  stable on current medication regimen, continue to follow-up with PCP  Hypothyroid- stable, continue with current medication regimen, managed by PCP   Encouraged to start exercising and add in strength training  Nutrition: low carb diet/ recommended to eat breakfast daily/ regular protein intake  Follow up with dietitian and psychologist as recommended. Discussed the role of sleep and stress in weight management. Counseled on comprehensive weight loss plan including attention to nutrition, exercise and behavior/stress management for success. See patient instruction below for more details. Discussed strategies to overcome barriers to successful weight loss and weight maintenance  FITTE: ACSM recommendations (150-300 minutes/ week in active weight loss)       There are no Patient Instructions on file for this visit. No follow-ups on file. Patient verbalizes understanding.

## 2024-01-09 NOTE — TELEPHONE ENCOUNTER
See following msg from pt MCM/ please advise. Would you like to send Rx for Ozempic or Zepbound?       From: Doreen Dexter  To: Pily Woods  Sent: 1/9/2024  3:11 PM CST  Subject: Meds    Happy New Year!    I’m so sorry to have to send another message regarding Qsymia 15-92 MG Cp24.  At the end of my last visit you called me on my cell phone in the office and told me not to pick it up since I already tried it back in 2018.      I sent a message to Jenni back on Dec. 21st and did not receive a reply which is unlike the office.      The insurance company will not cover Ozempic or Zepbound for weight loss.  I cannot take Qsymia due to side affects.      I would like to see if they will cover Wegovy if the office can state I tried Qsymia, phentermine and the daily Saxenda over the last four years.    Thank you so much for all of your help!

## 2024-01-13 NOTE — TELEPHONE ENCOUNTER
I can send for wegovy and we can get approval buts on dose shortage so theres no efficacy to sending unless she would like to trial something like compounded ozempic for 2-4 months then switch to wgovy     Compound semaglutide is a custom-made medication that mimics the GLP-1 hormone.. It is used to treat type 2 diabetes and lower the risk of heart or blood vessel disease. It works by increasing insulin release, lowering glucagon release, delaying gastric emptying and reducing appetite. Compound semaglutide may be prescribed when an FDA-approved medication, dose, or dosage form is unavailable

## 2024-01-25 ENCOUNTER — OFFICE VISIT (OUTPATIENT)
Dept: INTERNAL MEDICINE CLINIC | Facility: CLINIC | Age: 59
End: 2024-01-25
Payer: COMMERCIAL

## 2024-01-25 VITALS
WEIGHT: 227 LBS | SYSTOLIC BLOOD PRESSURE: 132 MMHG | HEART RATE: 72 BPM | HEIGHT: 65.5 IN | DIASTOLIC BLOOD PRESSURE: 82 MMHG | BODY MASS INDEX: 37.37 KG/M2 | RESPIRATION RATE: 16 BRPM

## 2024-01-25 DIAGNOSIS — E66.9 OBESITY (BMI 30-39.9): ICD-10-CM

## 2024-01-25 DIAGNOSIS — I10 ESSENTIAL HYPERTENSION: ICD-10-CM

## 2024-01-25 DIAGNOSIS — R63.2 BINGE EATING: ICD-10-CM

## 2024-01-25 DIAGNOSIS — R73.01 IFG (IMPAIRED FASTING GLUCOSE): ICD-10-CM

## 2024-01-25 DIAGNOSIS — Z51.81 ENCOUNTER FOR THERAPEUTIC DRUG MONITORING: Primary | ICD-10-CM

## 2024-01-25 DIAGNOSIS — E78.2 MIXED HYPERLIPIDEMIA: ICD-10-CM

## 2024-01-25 DIAGNOSIS — E03.9 HYPOTHYROIDISM, UNSPECIFIED TYPE: ICD-10-CM

## 2024-01-25 PROCEDURE — 99214 OFFICE O/P EST MOD 30 MIN: CPT | Performed by: INTERNAL MEDICINE

## 2024-01-25 PROCEDURE — 3008F BODY MASS INDEX DOCD: CPT | Performed by: INTERNAL MEDICINE

## 2024-01-25 PROCEDURE — 3075F SYST BP GE 130 - 139MM HG: CPT | Performed by: INTERNAL MEDICINE

## 2024-01-25 PROCEDURE — 3079F DIAST BP 80-89 MM HG: CPT | Performed by: INTERNAL MEDICINE

## 2024-01-25 RX ORDER — MELOXICAM 7.5 MG/1
7.5 TABLET ORAL DAILY
COMMUNITY
Start: 2024-01-18 | End: 2024-02-17

## 2024-01-25 RX ORDER — TIRZEPATIDE 2.5 MG/.5ML
2.5 INJECTION, SOLUTION SUBCUTANEOUS WEEKLY
Qty: 2 ML | Refills: 0 | Status: SHIPPED | OUTPATIENT
Start: 2024-01-25

## 2024-01-25 RX ORDER — TIRZEPATIDE 5 MG/.5ML
5 INJECTION, SOLUTION SUBCUTANEOUS WEEKLY
Qty: 2 ML | Refills: 0 | Status: SHIPPED | OUTPATIENT
Start: 2024-01-25

## 2024-01-25 NOTE — PROGRESS NOTES
HISTORY OF PRESENT ILLNESS  Chief Complaint   Patient presents with    Weight Check     Lost 4 pounds     Doreen Dexter is a 58 year old female is being evaluated as a video visit using Telemedicine with live, interactive video and audio    Cannot lose weight on her own   Is currently struggling with weigh tloss  Struggles with appetite/ constipation     Is currently exercising and monitoring closely     Seeing cardology recommended to start glp1 treatment     Reviewed 24 dietary recall   Working with / health  Lyssa Donald Readings from Last 6 Encounters:   01/25/24 227 lb (103 kg)   12/21/23 231 lb (104.8 kg)   11/21/23 230 lb (104.3 kg)   10/26/23 232 lb (105.2 kg)   08/07/23 243 lb (110.2 kg)   06/05/23 237 lb (107.5 kg)          Subjective  REVIEW OF SYSTEMS  GENERAL HEALTH: feels well otherwise, denied any fevers chills or night sweats   RESPIRATORY: denies shortness of breath   CARDIOVASCULAR: denies chest pain  GI: denies abdominal pain  PSYCH: denies any mood changes      Objective  EXAM  Reviewed most recent set of vitals   Physical Exam:  GENERAL: well developed, well nourished, in no apparent distress, speaking in full sentences comfortably   SKIN: warm, pink, dry without rashes to exposed area   EYES: conjunctiva pink  HEENT: atraumatic, normocephalic  LUNGS: normal work of breathing, non labored  CARDIO: normal work, no exertion  EXTREMITIES: no cyanosis, no clubbing, no edema  NEURO: Oriented times three  PSYCH: pleasant, cooperative, normal mood and affect    Lab Results   Component Value Date    WBC 7.6 07/24/2020    RBC 4.83 07/24/2020    HGB 13.8 07/24/2020    HCT 44.0 07/24/2020    MCV 91.1 07/24/2020    MCH 28.6 07/24/2020    MCHC 31.4 07/24/2020    RDW 12.5 07/24/2020    .0 07/24/2020     Lab Results   Component Value Date    GLU 85 07/24/2020    BUN 18 07/24/2020    BUNCREA 18.4 07/24/2020    CREATSERUM 0.98 07/24/2020    ANIONGAP 2 07/24/2020    GFR 90  03/04/2017    GFRNAA 66 07/24/2020    GFRAA 76 07/24/2020    CA 9.8 07/24/2020    OSMOCALC 289 07/24/2020    ALKPHO 80 07/24/2020    AST 17 07/24/2020    ALT 28 07/24/2020    BILT 0.7 07/24/2020    TP 7.9 07/24/2020    ALB 3.8 07/24/2020    GLOBULIN 4.1 07/24/2020     07/24/2020    K 3.7 07/24/2020     07/24/2020    CO2 29.0 07/24/2020     Lab Results   Component Value Date     07/24/2020    A1C 5.6 07/24/2020     Lab Results   Component Value Date    CHOLEST 214 (H) 07/24/2020    TRIG 83 07/24/2020    HDL 58 07/24/2020     (H) 07/24/2020    VLDL 17 07/24/2020    TCHDLRATIO 2.67 02/08/2018    NONHDLC 156 (H) 07/24/2020     Lab Results   Component Value Date    T4F 1.4 03/04/2017    TSH 2.270 07/24/2020     Lab Results   Component Value Date    B12 424 07/24/2020     Lab Results   Component Value Date    VITD 42.9 07/24/2020       Current Outpatient Medications on File Prior to Visit   Medication Sig Dispense Refill    metRONIDAZOLE 0.75 % External Cream Apply 1 Application topically 2 (two) times daily.      hydrocortisone 2.5 % External Cream APPLY 1 BEAD TOPICALLY TO THE AFFECTED AREA TWICE DAILY AS NEEDED      Meloxicam 7.5 MG Oral Tab Take 1 tablet (7.5 mg total) by mouth daily.      chlorthalidone 25 MG Oral Tab Take 1 tablet (25 mg total) by mouth daily.      Levothyroxine Sodium (SYNTHROID) 88 MCG Oral Tab Take 1 tablet (88 mcg total) by mouth before breakfast. 90 tablet 0     Current Facility-Administered Medications on File Prior to Visit   Medication Dose Route Frequency Provider Last Rate Last Admin    ipratropium-albuterol (DUONEB) nebulizer solution 3 mL  3 mL Nebulization Once Stacie Martinez APRN           ASSESSMENT  Analyzed weight data:       Diagnoses and all orders for this visit:    Encounter for therapeutic drug monitoring    Mixed hyperlipidemia    Obesity (BMI 30-39.9)    Essential hypertension    Binge eating    IFG (impaired fasting glucose)    Hypothyroidism,  unspecified type    Other orders  -     Discontinue: Tirzepatide 2.5 MG/0.5ML Subcutaneous Solution Pen-injector; Inject 2.5 mg into the skin once a week. ZEPBOUND  -     Discontinue: Tirzepatide 2.5 MG/0.5ML Subcutaneous Solution Pen-injector; Inject 2.5 mg into the skin once a week. ZEPBOUND  -     Tirzepatide-Weight Management (ZEPBOUND) 2.5 MG/0.5ML Subcutaneous Solution Auto-injector; Inject 2.5 mg into the skin once a week.            PLAN  Continue with medications: phentermine 37.5mg currently   Total time spent on chart review, pre-charting, obtaining history, counseling, and educating, reviewing labs was 30 minutes.  Trial of zepbound   -advised of side effects and adverse effects of this medication  Needs immediate access to obesity medicine treatment, delaying her care will be detrimental  to her health.     May 2018-June 2018 for QYSMIA : cannot take increased bloodpressure   Cannot take contrave due to increase blood pressure in 2020   [facility] are on back order per FDA with no end in sight.  -advised of side effects and adverse effects of this medication  CANNOT TAKE:: has done  belviq, saxenda, qsymia, metformin, VSL #3, topamax, diethylpropion,     HTN  blood pressure is in office is stable - continue present management  HLD  stable on current medication regimen, continue to follow-up with PCP  Hypothyroid- stable, continue with current medication regimen, managed by PCP   Encouraged to start exercising and add in strength training  Nutrition: low carb diet/ recommended to eat breakfast daily/ regular protein intake  Follow up with dietitian and psychologist as recommended.  Discussed the role of sleep and stress in weight management.  Counseled on comprehensive weight loss plan including attention to nutrition, exercise and behavior/stress management for success. See patient instruction below for more details.  Discussed strategies to overcome barriers to successful weight loss and weight  maintenance  FITTE: ACSM recommendations (150-300 minutes/ week in active weight loss)       There are no Patient Instructions on file for this visit.    No follow-ups on file.    Patient verbalizes understanding.

## 2024-02-02 ENCOUNTER — TELEPHONE (OUTPATIENT)
Dept: INTERNAL MEDICINE CLINIC | Facility: CLINIC | Age: 59
End: 2024-02-02

## 2024-02-02 NOTE — TELEPHONE ENCOUNTER
Patient called to state the letter of appeal Dr. Woods wrote needs to be faxed to [phone] attention Appeal Department  And [facility]      I reached out to Mervat at office and she printed the letter and faxed   Awaiting confirmation

## 2024-02-21 RX ORDER — TIRZEPATIDE 2.5 MG/.5ML
2.5 INJECTION, SOLUTION SUBCUTANEOUS WEEKLY
Qty: 2 ML | Refills: 0 | OUTPATIENT
Start: 2024-02-21

## 2024-02-28 ENCOUNTER — OFFICE VISIT (OUTPATIENT)
Dept: INTERNAL MEDICINE CLINIC | Facility: CLINIC | Age: 59
End: 2024-02-28
Payer: COMMERCIAL

## 2024-02-28 DIAGNOSIS — E66.9 OBESITY (BMI 30-39.9): ICD-10-CM

## 2024-02-28 PROCEDURE — 97803 MED NUTRITION INDIV SUBSEQ: CPT

## 2024-02-28 NOTE — PROGRESS NOTES
FOLLOW UP NUTRITION CONSULTATION    Nutrition Assessment    Number of consultations with dietitian: 1    Height:  Ht Readings from Last 1 Encounters:   01/25/24 5' 5.5\" (1.664 m)       Weight:   Wt Readings from Last 2 Encounters:   01/25/24 227 lb   12/21/23 231 lb       BMI:  BMI Readings from Last 1 Encounters:   01/25/24 37.20 kg/m²       Weight change: stable recently, just starting to take Zepbound                                                                                                                                   Diet/Lifestyle Modifications Following Previous Nutrition Consultation      Food journal: not tracking currently     Food/Beverage Intake:   Breakfast: two good yogurt 80 calories 12 g protein, 1 string cheese 7 g protein (19 g)  Lunch: 3 oz meatball (18 g protein) cherry tomato string cheese 7 g (25 g total protein) pears   Dinner: chicken breast (30 g protein), broccoli, salad (light ranch)   Snacks: popcorn (100 shelbie smart pop)   Beverages: water (2 large smart camacho)     Physical Activity: 40 min per day: weight classes every day for arms  6 k steps per day     Spent 45 minutes in consultation with the patient.      Nutrition Assessment and Intervention/Education:    Nutrition follow up for weight loss was provided. Changes in Eating Habits: patient is focusing on protein and still exercising a lot. There were days when pt would wait until the afternoon to eat - believes she may have been under eating. She is now on a weight loss medication; paying more attention to eating throughout the day and getting protein in. Encouraged pt to continue and to add protein to snack. She has added in strength training recently  - encouraged to continue to help boost metabolism/preserve LBM.   Patient agreed to goals below.    Goals:   Continue exercise/strength training - try to work on all muscle groups   Continue small, frequent meals with protein snacks     Monitoring/Evaluation: {Follow up  appt scheduled with dietitian        Katrin Rubalcava RD, LDN

## 2024-02-29 ENCOUNTER — OFFICE VISIT (OUTPATIENT)
Dept: INTERNAL MEDICINE CLINIC | Facility: CLINIC | Age: 59
End: 2024-02-29
Payer: COMMERCIAL

## 2024-02-29 VITALS
WEIGHT: 225 LBS | HEART RATE: 74 BPM | HEIGHT: 65.5 IN | BODY MASS INDEX: 37.04 KG/M2 | RESPIRATION RATE: 16 BRPM | DIASTOLIC BLOOD PRESSURE: 84 MMHG | SYSTOLIC BLOOD PRESSURE: 128 MMHG

## 2024-02-29 DIAGNOSIS — I10 ESSENTIAL HYPERTENSION: ICD-10-CM

## 2024-02-29 DIAGNOSIS — R73.01 IFG (IMPAIRED FASTING GLUCOSE): ICD-10-CM

## 2024-02-29 DIAGNOSIS — E66.9 OBESITY (BMI 30-39.9): ICD-10-CM

## 2024-02-29 DIAGNOSIS — R63.2 BINGE EATING: ICD-10-CM

## 2024-02-29 DIAGNOSIS — Z51.81 ENCOUNTER FOR THERAPEUTIC DRUG MONITORING: Primary | ICD-10-CM

## 2024-02-29 DIAGNOSIS — E78.2 MIXED HYPERLIPIDEMIA: ICD-10-CM

## 2024-02-29 PROCEDURE — 99214 OFFICE O/P EST MOD 30 MIN: CPT | Performed by: INTERNAL MEDICINE

## 2024-02-29 RX ORDER — TIRZEPATIDE 5 MG/.5ML
5 INJECTION, SOLUTION SUBCUTANEOUS WEEKLY
Qty: 2 ML | Refills: 2 | Status: SHIPPED | OUTPATIENT
Start: 2024-02-29

## 2024-02-29 RX ORDER — TIRZEPATIDE 5 MG/.5ML
5 INJECTION, SOLUTION SUBCUTANEOUS WEEKLY
Qty: 2 ML | Refills: 2 | Status: SHIPPED | OUTPATIENT
Start: 2024-02-29 | End: 2024-02-29

## 2024-02-29 RX ORDER — CELECOXIB 100 MG/1
100 CAPSULE ORAL
COMMUNITY
Start: 2024-02-01 | End: 2024-04-01

## 2024-02-29 RX ORDER — TIRZEPATIDE 2.5 MG/.5ML
2.5 INJECTION, SOLUTION SUBCUTANEOUS WEEKLY
Qty: 2 ML | Refills: 0 | Status: SHIPPED | OUTPATIENT
Start: 2024-02-29

## 2024-02-29 NOTE — PROGRESS NOTES
HISTORY OF PRESENT ILLNESS  Chief Complaint   Patient presents with    Weight Check     Lost 2 pounds     Doreen Dexter is a 58 year old female is being evaluated as a video visit using Telemedicine with live, interactive video and audio  Reviewed recommendations with Katrin   Overall nutrition has been good!  Started on zepbound this past week   Saw Katrin yesterday and doing really well but weight loss is going slow   Had pen malfunction with the injection   Feel appetite has shut down   No symptoms of hunger   Drinking only water throughout the day   Doing 70 grams of protein throughout the day   Doing excellent with exercise        Wt Readings from Last 6 Encounters:   02/29/24 225 lb (102.1 kg)   01/25/24 227 lb (103 kg)   12/21/23 231 lb (104.8 kg)   11/21/23 230 lb (104.3 kg)   10/26/23 232 lb (105.2 kg)   08/07/23 243 lb (110.2 kg)          Subjective  REVIEW OF SYSTEMS  GENERAL HEALTH: feels well otherwise, denied any fevers chills or night sweats   RESPIRATORY: denies shortness of breath   CARDIOVASCULAR: denies chest pain  GI: denies abdominal pain  PSYCH: denies any mood changes      Objective  EXAM  Reviewed most recent set of vitals   Physical Exam:  GENERAL: well developed, well nourished, in no apparent distress, speaking in full sentences comfortably   SKIN: warm, pink, dry without rashes to exposed area   EYES: conjunctiva pink  HEENT: atraumatic, normocephalic  LUNGS: normal work of breathing, non labored  CARDIO: normal work, no exertion  EXTREMITIES: no cyanosis, no clubbing, no edema  NEURO: Oriented times three  PSYCH: pleasant, cooperative, normal mood and affect    Lab Results   Component Value Date    WBC 7.6 07/24/2020    RBC 4.83 07/24/2020    HGB 13.8 07/24/2020    HCT 44.0 07/24/2020    MCV 91.1 07/24/2020    MCH 28.6 07/24/2020    MCHC 31.4 07/24/2020    RDW 12.5 07/24/2020    .0 07/24/2020     Lab Results   Component Value Date    GLU 85 07/24/2020    BUN 18 07/24/2020     BUNCREA 18.4 07/24/2020    CREATSERUM 0.98 07/24/2020    ANIONGAP 2 07/24/2020    GFR 90 03/04/2017    GFRNAA 66 07/24/2020    GFRAA 76 07/24/2020    CA 9.8 07/24/2020    OSMOCALC 289 07/24/2020    ALKPHO 80 07/24/2020    AST 17 07/24/2020    ALT 28 07/24/2020    BILT 0.7 07/24/2020    TP 7.9 07/24/2020    ALB 3.8 07/24/2020    GLOBULIN 4.1 07/24/2020     07/24/2020    K 3.7 07/24/2020     07/24/2020    CO2 29.0 07/24/2020     Lab Results   Component Value Date     07/24/2020    A1C 5.6 07/24/2020     Lab Results   Component Value Date    CHOLEST 214 (H) 07/24/2020    TRIG 83 07/24/2020    HDL 58 07/24/2020     (H) 07/24/2020    VLDL 17 07/24/2020    TCHDLRATIO 2.67 02/08/2018    NONHDLC 156 (H) 07/24/2020     Lab Results   Component Value Date    T4F 1.4 03/04/2017    TSH 2.270 07/24/2020     Lab Results   Component Value Date    B12 424 07/24/2020     Lab Results   Component Value Date    VITD 42.9 07/24/2020       Current Outpatient Medications on File Prior to Visit   Medication Sig Dispense Refill    celecoxib 100 MG Oral Cap Take 1 capsule (100 mg total) by mouth.      chlorthalidone 25 MG Oral Tab Take 1 tablet (25 mg total) by mouth daily.      Levothyroxine Sodium (SYNTHROID) 88 MCG Oral Tab Take 1 tablet (88 mcg total) by mouth before breakfast. 90 tablet 0     Current Facility-Administered Medications on File Prior to Visit   Medication Dose Route Frequency Provider Last Rate Last Admin    ipratropium-albuterol (DUONEB) nebulizer solution 3 mL  3 mL Nebulization Once Stacie Martinez APRN           ASSESSMENT  Analyzed weight data:       Diagnoses and all orders for this visit:    Encounter for therapeutic drug monitoring  -     Tirzepatide-Weight Management (ZEPBOUND) 2.5 MG/0.5ML Subcutaneous Solution Auto-injector; Inject 2.5 mg into the skin once a week.    Obesity (BMI 30-39.9)  -     Tirzepatide-Weight Management (ZEPBOUND) 2.5 MG/0.5ML Subcutaneous Solution Auto-injector;  Inject 2.5 mg into the skin once a week.    Essential hypertension  -     Tirzepatide-Weight Management (ZEPBOUND) 2.5 MG/0.5ML Subcutaneous Solution Auto-injector; Inject 2.5 mg into the skin once a week.    IFG (impaired fasting glucose)  -     Tirzepatide-Weight Management (ZEPBOUND) 2.5 MG/0.5ML Subcutaneous Solution Auto-injector; Inject 2.5 mg into the skin once a week.    Mixed hyperlipidemia  -     Tirzepatide-Weight Management (ZEPBOUND) 2.5 MG/0.5ML Subcutaneous Solution Auto-injector; Inject 2.5 mg into the skin once a week.    Binge eating  -     Tirzepatide-Weight Management (ZEPBOUND) 2.5 MG/0.5ML Subcutaneous Solution Auto-injector; Inject 2.5 mg into the skin once a week.    Other orders  -     Discontinue: Tirzepatide-Weight Management (ZEPBOUND) 5 MG/0.5ML Subcutaneous Solution Auto-injector; Inject 5 mg into the skin once a week.  -     Tirzepatide-Weight Management (ZEPBOUND) 5 MG/0.5ML Subcutaneous Solution Auto-injector; Inject 5 mg into the skin once a week.              PLAN  Wt Readings from Last 6 Encounters:   02/29/24 225 lb (102.1 kg)   01/25/24 227 lb (103 kg)   12/21/23 231 lb (104.8 kg)   11/21/23 230 lb (104.3 kg)   10/26/23 232 lb (105.2 kg)   08/07/23 243 lb (110.2 kg)     Weight Max: 243 lb   Total time spent on chart review, pre-charting, obtaining history, counseling, and educating, reviewing labs was 30 minutes.  Down 2 lb from previous   Continue zepbound   -advised of side effects and adverse effects of this medication    May 2018-June 2018 for QYSMIA : cannot take increased bloodpressure   Cannot take contrave due to increase blood pressure in 2020   [facility] are on back order per FDA with no end in sight.  -advised of side effects and adverse effects of this medication  CANNOT TAKE:: has done  belviq, saxenda, qsymia, metformin, VSL #3, topamax, diethylpropion,     HTN  blood pressure is in office is stable - continue present management  HLD  stable on current  medication regimen, continue to follow-up with PCP  Hypothyroid- stable, continue with current medication regimen, managed by PCP   Encouraged to start exercising and add in strength training  Nutrition: low carb diet/ recommended to eat breakfast daily/ regular protein intake  Follow up with dietitian and psychologist as recommended.  Discussed the role of sleep and stress in weight management.  Counseled on comprehensive weight loss plan including attention to nutrition, exercise and behavior/stress management for success. See patient instruction below for more details.  Discussed strategies to overcome barriers to successful weight loss and weight maintenance  FITTE: ACSM recommendations (150-300 minutes/ week in active weight loss)       There are no Patient Instructions on file for this visit.    No follow-ups on file.    Patient verbalizes understanding.

## 2024-03-19 ENCOUNTER — TELEPHONE (OUTPATIENT)
Dept: INTERNAL MEDICINE CLINIC | Facility: CLINIC | Age: 59
End: 2024-03-19

## 2024-03-19 NOTE — TELEPHONE ENCOUNTER
I talked to patient and discussed her concerns.  I explained zepbound does not effect urine issues.  She did proceed with next dose up and will let us know how she is doing.  I advised Dr. Woods gave refills at 5 mg but to let us know if she reaches standstill with weight loss or no appetite control if repeating the dose.

## 2024-03-19 NOTE — TELEPHONE ENCOUNTER
Patient left a message to call  her regarding possible UTI and correlation to medicine  I called her today and LVM to call me back

## 2024-04-01 NOTE — PROGRESS NOTES
FOLLOW UP NUTRITION CONSULTATION        Nutrition Assessment    Number of consultations with dietitian: 2    Height:  Ht Readings from Last 1 Encounters:   02/29/24 5' 5.5\" (1.664 m)       Weight:   Wt Readings from Last 2 Encounters:   02/29/24 225 lb   01/25/24 227 lb       BMI:  BMI Readings from Last 1 Encounters:   02/29/24 36.87 kg/m²       Weight change: Decrease of 5 lbs in the past 1 month  Increased dosage of zepbound           Diet/Lifestyle Modifications Following Previous Nutrition Consultation      Food journal: tracks on paper her grams of protein and carbs - aiming for 20-30 g per meal     Food/Beverage Intake:   Breakfast: cheese stick before gym; after gym - has meat and salad two good yogurt 80 calories 12 g protein, 1 string cheese 7 g protein (19 g)  Lunch: huge plate of salad with some meat (3 oz) 3 oz meatball (18 g protein) cherry tomato string cheese 7 g (25 g total protein) pears   2 hours later has more meat   Dinner: salad and meat sometimes with 1/2 cup to 1 cup of pasta or rice chicken breast (30 g protein), broccoli, salad (light ranch)   Snacks: popcorn (100 shelbie smart pop)   Beverages: water (2 large smart camacho)      Physical Activity: 40 min per day: weight classes every day for arms  6 k steps per day   Added in strength with  and physical therapist - legs     Spent 45 minutes in consultation with the patient.      Nutrition Assessment and Intervention/Education:    Nutrition follow up for weight loss was provided. Changes in Eating Habits: added in protein with snacks; having a lot of vegetables; avoiding most carbs but will enjoy 1/2 cup of pasta on occasion - encouraged to add fiber rich carbs such as fruit/sweet potatoes in moderation for energy if needed. She is likely getting more than enough fiber and protein, calories low. Weight trending down at an appropriate rate. Congratulated pt on her efforts. She is also now working with at  and physical  therapist. Patient agreed to goals below.    Goals:   Continue exercise/strength training - try to work on all muscle groups goal met, started with a    Continue small, frequent meals with protein snacks - goal met     Monitoring/Evaluation: {Follow up appt scheduled with dietitian        Katrin Rubalcava RD, LDN

## 2024-04-02 ENCOUNTER — OFFICE VISIT (OUTPATIENT)
Dept: INTERNAL MEDICINE CLINIC | Facility: CLINIC | Age: 59
End: 2024-04-02
Payer: COMMERCIAL

## 2024-04-02 VITALS — WEIGHT: 220 LBS | BODY MASS INDEX: 36 KG/M2

## 2024-04-02 DIAGNOSIS — E66.9 OBESITY (BMI 30-39.9): Primary | ICD-10-CM

## 2024-04-02 PROCEDURE — 97803 MED NUTRITION INDIV SUBSEQ: CPT

## 2024-04-09 RX ORDER — TIRZEPATIDE 5 MG/.5ML
5 INJECTION, SOLUTION SUBCUTANEOUS WEEKLY
Qty: 2 ML | Refills: 2 | Status: CANCELLED | OUTPATIENT
Start: 2024-04-09

## 2024-04-10 NOTE — TELEPHONE ENCOUNTER
I tried calling patient to discuss and got voicemail.  Left message I will reach out on my chart.

## 2024-04-11 NOTE — PROGRESS NOTES
HISTORY OF PRESENT ILLNESS  Chief Complaint   Patient presents with    Weight Check     Video        Doreen Dexter is a 58 year old female is being evaluated as a video visit using Telemedicine with live, interactive video and audio  Reviewed recommendations with Katrin   Saw her on 4/2/24   Currently on zepbound   Curretly at 214.5 on home scale   Working out an hour most days and very regimented on her eating.   Has been watching her carbs closely   Currently also in PT with torn meniscus as well and currently working with orthopedic surgeon   Blood pressure has been stable         Wt Readings from Last 6 Encounters:   04/02/24 220 lb (99.8 kg)   02/29/24 225 lb (102.1 kg)   01/25/24 227 lb (103 kg)   12/21/23 231 lb (104.8 kg)   11/21/23 230 lb (104.3 kg)   10/26/23 232 lb (105.2 kg)          Subjective  REVIEW OF SYSTEMS  GENERAL HEALTH: feels well otherwise, denied any fevers chills or night sweats   RESPIRATORY: denies shortness of breath   CARDIOVASCULAR: denies chest pain  GI: denies abdominal pain  PSYCH: denies any mood changes      Objective  EXAM  Reviewed most recent set of vitals   Physical Exam:  GENERAL: well developed, well nourished, in no apparent distress, speaking in full sentences comfortably   SKIN: warm, pink, dry without rashes to exposed area   EYES: conjunctiva pink  HEENT: atraumatic, normocephalic  LUNGS: normal work of breathing, non labored  CARDIO: normal work, no exertion  EXTREMITIES: no cyanosis, no clubbing, no edema  NEURO: Oriented times three  PSYCH: pleasant, cooperative, normal mood and affect    Lab Results   Component Value Date    WBC 7.6 07/24/2020    RBC 4.83 07/24/2020    HGB 13.8 07/24/2020    HCT 44.0 07/24/2020    MCV 91.1 07/24/2020    MCH 28.6 07/24/2020    MCHC 31.4 07/24/2020    RDW 12.5 07/24/2020    .0 07/24/2020     Lab Results   Component Value Date    GLU 85 07/24/2020    BUN 18 07/24/2020    BUNCREA 18.4 07/24/2020    CREATSERUM 0.98 07/24/2020     ANIONGAP 2 07/24/2020    GFR 90 03/04/2017    GFRNAA 66 07/24/2020    GFRAA 76 07/24/2020    CA 9.8 07/24/2020    OSMOCALC 289 07/24/2020    ALKPHO 80 07/24/2020    AST 17 07/24/2020    ALT 28 07/24/2020    BILT 0.7 07/24/2020    TP 7.9 07/24/2020    ALB 3.8 07/24/2020    GLOBULIN 4.1 07/24/2020     07/24/2020    K 3.7 07/24/2020     07/24/2020    CO2 29.0 07/24/2020     Lab Results   Component Value Date     07/24/2020    A1C 5.6 07/24/2020     Lab Results   Component Value Date    CHOLEST 214 (H) 07/24/2020    TRIG 83 07/24/2020    HDL 58 07/24/2020     (H) 07/24/2020    VLDL 17 07/24/2020    TCHDLRATIO 2.67 02/08/2018    NONHDLC 156 (H) 07/24/2020     Lab Results   Component Value Date    T4F 1.4 03/04/2017    TSH 2.270 07/24/2020     Lab Results   Component Value Date    B12 424 07/24/2020     Lab Results   Component Value Date    VITD 42.9 07/24/2020       Current Outpatient Medications on File Prior to Visit   Medication Sig Dispense Refill    chlorthalidone 25 MG Oral Tab Take 1 tablet (25 mg total) by mouth daily.      Levothyroxine Sodium (SYNTHROID) 88 MCG Oral Tab Take 1 tablet (88 mcg total) by mouth before breakfast. 90 tablet 0     Current Facility-Administered Medications on File Prior to Visit   Medication Dose Route Frequency Provider Last Rate Last Admin    ipratropium-albuterol (DUONEB) nebulizer solution 3 mL  3 mL Nebulization Once Stacie Martinez APRN           ASSESSMENT  Analyzed weight data:       Diagnoses and all orders for this visit:    Essential hypertension    Obesity (BMI 30-39.9)    Encounter for therapeutic drug monitoring    Mixed hyperlipidemia    IFG (impaired fasting glucose)    Other orders  -     Tirzepatide-Weight Management (ZEPBOUND) 7.5 MG/0.5ML Subcutaneous Solution Auto-injector; Inject 7.5 mg into the skin once a week.              PLAN  Wt Readings from Last 6 Encounters:   04/02/24 220 lb (99.8 kg)   02/29/24 225 lb (102.1 kg)   01/25/24  227 lb (103 kg)   12/21/23 231 lb (104.8 kg)   11/21/23 230 lb (104.3 kg)   10/26/23 232 lb (105.2 kg)     Weight Max: 243 lb   Down to 214 on scale   Total time spent on chart review, pre-charting, obtaining history, counseling, and educating, reviewing labs was 30 minutes.    Continue zepbound   -advised of side effects and adverse effects of this medication  Continues to follow regularly with Katrin and scheduled in [place]       May 2018-June 2018 for QYSMIA : cannot take increased bloodpressure   Cannot take contrave due to increase blood pressure in 2020   WEGOVDANIEL and SAXENDA are on back order per FDA with no end in sight.  -advised of side effects and adverse effects of this medication  CANNOT TAKE:: has done  belviq, saxenda, qsymia, metformin, VSL #3, topamax, diethylpropion,     HTN  blood pressure is in office is stable - continue present management  HLD  stable on current medication regimen, continue to follow-up with PCP  Hypothyroid- stable, continue with current medication regimen, managed by PCP   Encouraged to start exercising and add in strength training  Nutrition: low carb diet/ recommended to eat breakfast daily/ regular protein intake  Follow up with dietitian and psychologist as recommended.  Discussed the role of sleep and stress in weight management.  Counseled on comprehensive weight loss plan including attention to nutrition, exercise and behavior/stress management for success. See patient instruction below for more details.  Discussed strategies to overcome barriers to successful weight loss and weight maintenance  FITTE: ACSM recommendations (150-300 minutes/ week in active weight loss)       There are no Patient Instructions on file for this visit.    No follow-ups on file.    Patient verbalizes understanding.

## 2024-04-15 ENCOUNTER — TELEMEDICINE (OUTPATIENT)
Dept: INTERNAL MEDICINE CLINIC | Facility: CLINIC | Age: 59
End: 2024-04-15
Payer: COMMERCIAL

## 2024-04-15 DIAGNOSIS — E66.9 OBESITY (BMI 30-39.9): ICD-10-CM

## 2024-04-15 DIAGNOSIS — I10 ESSENTIAL HYPERTENSION: Primary | ICD-10-CM

## 2024-04-15 DIAGNOSIS — R73.01 IFG (IMPAIRED FASTING GLUCOSE): ICD-10-CM

## 2024-04-15 DIAGNOSIS — E78.2 MIXED HYPERLIPIDEMIA: ICD-10-CM

## 2024-04-15 DIAGNOSIS — Z51.81 ENCOUNTER FOR THERAPEUTIC DRUG MONITORING: ICD-10-CM

## 2024-04-15 RX ORDER — TIRZEPATIDE 7.5 MG/.5ML
7.5 INJECTION, SOLUTION SUBCUTANEOUS WEEKLY
Qty: 2 ML | Refills: 2 | Status: SHIPPED | OUTPATIENT
Start: 2024-04-15

## 2024-04-17 ENCOUNTER — TELEPHONE (OUTPATIENT)
Dept: INTERNAL MEDICINE CLINIC | Facility: CLINIC | Age: 59
End: 2024-04-17

## 2024-04-17 NOTE — TELEPHONE ENCOUNTER
I called patient to discuss her concerns with zepbound  Patient has been on 5 mg and trying to move up to 7.5 mg dose.  She cannot find that anywhere and no other doses.  Asking what do do.     May 2018-June 2018 for QYSMIA : cannot take increased bloodpressure   Cannot take contrave due to increase blood pressure in 2020   Carola are on back order per FDA with no end in sight.  -advised of side effects and adverse effects of this medication  CANNOT TAKE:: has done  belviq, saxenda, qsymia, metformin, VSL #3, topamax, diethylpropion,

## 2024-04-23 ENCOUNTER — LAB ENCOUNTER (OUTPATIENT)
Dept: LAB | Age: 59
End: 2024-04-23
Attending: INTERNAL MEDICINE
Payer: COMMERCIAL

## 2024-04-23 ENCOUNTER — OFFICE VISIT (OUTPATIENT)
Dept: FAMILY MEDICINE CLINIC | Facility: CLINIC | Age: 59
End: 2024-04-23
Payer: COMMERCIAL

## 2024-04-23 ENCOUNTER — LAB ENCOUNTER (OUTPATIENT)
Dept: LAB | Age: 59
End: 2024-04-23
Attending: EMERGENCY MEDICINE
Payer: COMMERCIAL

## 2024-04-23 ENCOUNTER — TELEPHONE (OUTPATIENT)
Dept: FAMILY MEDICINE CLINIC | Facility: CLINIC | Age: 59
End: 2024-04-23

## 2024-04-23 VITALS
OXYGEN SATURATION: 98 % | WEIGHT: 214.75 LBS | HEIGHT: 65.5 IN | BODY MASS INDEX: 35.35 KG/M2 | HEART RATE: 81 BPM | RESPIRATION RATE: 14 BRPM

## 2024-04-23 DIAGNOSIS — Z01.818 PRE-OP TESTING: Primary | ICD-10-CM

## 2024-04-23 DIAGNOSIS — R73.01 IFG (IMPAIRED FASTING GLUCOSE): ICD-10-CM

## 2024-04-23 DIAGNOSIS — I10 ESSENTIAL HYPERTENSION: ICD-10-CM

## 2024-04-23 DIAGNOSIS — I10 ESSENTIAL HYPERTENSION, MALIGNANT: ICD-10-CM

## 2024-04-23 DIAGNOSIS — R63.2 BINGE EATING: ICD-10-CM

## 2024-04-23 DIAGNOSIS — E03.9 HYPOTHYROIDISM, UNSPECIFIED TYPE: ICD-10-CM

## 2024-04-23 DIAGNOSIS — S83.282A ACUTE LATERAL MENISCUS TEAR OF LEFT KNEE, INITIAL ENCOUNTER: ICD-10-CM

## 2024-04-23 DIAGNOSIS — E78.2 MIXED HYPERLIPIDEMIA: ICD-10-CM

## 2024-04-23 LAB
ALBUMIN SERPL-MCNC: 3.6 G/DL (ref 3.4–5)
ALBUMIN/GLOB SERPL: 0.9 {RATIO} (ref 1–2)
ALP LIVER SERPL-CCNC: 59 U/L
ALT SERPL-CCNC: 28 U/L
ANION GAP SERPL CALC-SCNC: 6 MMOL/L (ref 0–18)
AST SERPL-CCNC: 22 U/L (ref 15–37)
BASOPHILS # BLD AUTO: 0.04 X10(3) UL (ref 0–0.2)
BASOPHILS NFR BLD AUTO: 0.7 %
BILIRUB SERPL-MCNC: 0.7 MG/DL (ref 0.1–2)
BILIRUB UR QL STRIP.AUTO: NEGATIVE
BUN BLD-MCNC: 18 MG/DL (ref 9–23)
CALCIUM BLD-MCNC: 9.8 MG/DL (ref 8.5–10.1)
CHLORIDE SERPL-SCNC: 100 MMOL/L (ref 98–112)
CHOLEST SERPL-MCNC: 200 MG/DL (ref ?–200)
CO2 SERPL-SCNC: 33 MMOL/L (ref 21–32)
COLOR UR AUTO: YELLOW
CREAT BLD-MCNC: 0.9 MG/DL
EGFRCR SERPLBLD CKD-EPI 2021: 75 ML/MIN/1.73M2 (ref 60–?)
EOSINOPHIL # BLD AUTO: 0.1 X10(3) UL (ref 0–0.7)
EOSINOPHIL NFR BLD AUTO: 1.6 %
ERYTHROCYTE [DISTWIDTH] IN BLOOD BY AUTOMATED COUNT: 13.2 %
EST. AVERAGE GLUCOSE BLD GHB EST-MCNC: 114 MG/DL (ref 68–126)
FASTING PATIENT LIPID ANSWER: YES
FASTING STATUS PATIENT QL REPORTED: YES
FOLATE SERPL-MCNC: 12.5 NG/ML (ref 8.7–?)
GLOBULIN PLAS-MCNC: 4 G/DL (ref 2.8–4.4)
GLUCOSE BLD-MCNC: 93 MG/DL (ref 70–99)
GLUCOSE UR STRIP.AUTO-MCNC: NORMAL MG/DL
HBA1C MFR BLD: 5.6 % (ref ?–5.7)
HCT VFR BLD AUTO: 41.6 %
HDLC SERPL-MCNC: 55 MG/DL (ref 40–59)
HGB BLD-MCNC: 13.8 G/DL
IMM GRANULOCYTES # BLD AUTO: 0.02 X10(3) UL (ref 0–1)
IMM GRANULOCYTES NFR BLD: 0.3 %
INR BLD: 0.97 (ref 0.8–1.2)
KETONES UR STRIP.AUTO-MCNC: 10 MG/DL
LDLC SERPL CALC-MCNC: 133 MG/DL (ref ?–100)
LEUKOCYTE ESTERASE UR QL STRIP.AUTO: NEGATIVE
LYMPHOCYTES # BLD AUTO: 2.58 X10(3) UL (ref 1–4)
LYMPHOCYTES NFR BLD AUTO: 42.2 %
MCH RBC QN AUTO: 28.3 PG (ref 26–34)
MCHC RBC AUTO-ENTMCNC: 33.2 G/DL (ref 31–37)
MCV RBC AUTO: 85.4 FL
MONOCYTES # BLD AUTO: 0.45 X10(3) UL (ref 0.1–1)
MONOCYTES NFR BLD AUTO: 7.4 %
NEUTROPHILS # BLD AUTO: 2.93 X10 (3) UL (ref 1.5–7.7)
NEUTROPHILS # BLD AUTO: 2.93 X10(3) UL (ref 1.5–7.7)
NEUTROPHILS NFR BLD AUTO: 47.8 %
NITRITE UR QL STRIP.AUTO: NEGATIVE
NONHDLC SERPL-MCNC: 145 MG/DL (ref ?–130)
OSMOLALITY SERPL CALC.SUM OF ELEC: 290 MOSM/KG (ref 275–295)
PH UR STRIP.AUTO: 6.5 [PH] (ref 5–8)
PLATELET # BLD AUTO: 292 10(3)UL (ref 150–450)
POTASSIUM SERPL-SCNC: 3.3 MMOL/L (ref 3.5–5.1)
PROT SERPL-MCNC: 7.6 G/DL (ref 6.4–8.2)
PROT UR STRIP.AUTO-MCNC: 20 MG/DL
PROTHROMBIN TIME: 12.9 SECONDS (ref 11.6–14.8)
RBC # BLD AUTO: 4.87 X10(6)UL
RBC UR QL AUTO: NEGATIVE
SODIUM SERPL-SCNC: 139 MMOL/L (ref 136–145)
SP GR UR STRIP.AUTO: 1.03 (ref 1–1.03)
T4 FREE SERPL-MCNC: 1.2 NG/DL (ref 0.8–1.7)
TRIGL SERPL-MCNC: 64 MG/DL (ref 30–149)
TSI SER-ACNC: 2.01 MIU/ML (ref 0.36–3.74)
UROBILINOGEN UR STRIP.AUTO-MCNC: NORMAL MG/DL
VIT B12 SERPL-MCNC: 365 PG/ML (ref 193–986)
VIT D+METAB SERPL-MCNC: 53.4 NG/ML (ref 30–100)
VLDLC SERPL CALC-MCNC: 12 MG/DL (ref 0–30)
WBC # BLD AUTO: 6.1 X10(3) UL (ref 4–11)

## 2024-04-23 PROCEDURE — 85610 PROTHROMBIN TIME: CPT

## 2024-04-23 PROCEDURE — 36415 COLL VENOUS BLD VENIPUNCTURE: CPT

## 2024-04-23 PROCEDURE — 85025 COMPLETE CBC W/AUTO DIFF WBC: CPT

## 2024-04-23 PROCEDURE — 82306 VITAMIN D 25 HYDROXY: CPT

## 2024-04-23 PROCEDURE — 82607 VITAMIN B-12: CPT

## 2024-04-23 PROCEDURE — 83520 IMMUNOASSAY QUANT NOS NONAB: CPT

## 2024-04-23 PROCEDURE — 80053 COMPREHEN METABOLIC PANEL: CPT

## 2024-04-23 PROCEDURE — 81001 URINALYSIS AUTO W/SCOPE: CPT

## 2024-04-23 PROCEDURE — 83036 HEMOGLOBIN GLYCOSYLATED A1C: CPT

## 2024-04-23 PROCEDURE — 84439 ASSAY OF FREE THYROXINE: CPT

## 2024-04-23 PROCEDURE — 82746 ASSAY OF FOLIC ACID SERUM: CPT

## 2024-04-23 PROCEDURE — 80061 LIPID PANEL: CPT

## 2024-04-23 PROCEDURE — 84443 ASSAY THYROID STIM HORMONE: CPT

## 2024-04-23 PROCEDURE — 99203 OFFICE O/P NEW LOW 30 MIN: CPT | Performed by: EMERGENCY MEDICINE

## 2024-04-23 NOTE — PROGRESS NOTES
Doreen Dexter is a 58 year old female who presents for a pre-operative physical exam.   HPI related to surgery:   Doreen Dexter is scheduled for a Left meniscal tear procedure at [facility] on 5/08/2024 to be performed by Dr ISH Woods.     Indication: Left meniscal tear    Patient reports previous anesthesia:  Yes.    Previous complications: No    Follows up with Cardiology for HTN, on Chlorthalidone  Dx with HTN in 10/2023  No hx of cardiac or pulm conditions            Social History     Socioeconomic History    Marital status:    Tobacco Use    Smoking status: Never    Smokeless tobacco: Never   Vaping Use    Vaping status: Never Used   Substance and Sexual Activity    Alcohol use: No    Drug use: No      Past Medical History:    Thyroid disease     Past Surgical History:   Procedure Laterality Date    Hysterectomy  12/05/2017    total    Upper arm/elbow surgery unlisted Left     Left forearm      Allergies   Allergen Reactions    Phentermine-Topiramate OTHER (SEE COMMENTS)     HIGH BLOOD PRESSURE    Other reaction(s): OTHER (SEE COMMENTS)   HIGH BLOOD PRESSURE     Current Outpatient Medications   Medication Sig Dispense Refill    Tirzepatide-Weight Management (ZEPBOUND) 7.5 MG/0.5ML Subcutaneous Solution Auto-injector Inject 7.5 mg into the skin once a week. 2 mL 2    chlorthalidone 25 MG Oral Tab Take 1 tablet (25 mg total) by mouth daily.      Levothyroxine Sodium (SYNTHROID) 88 MCG Oral Tab Take 1 tablet (88 mcg total) by mouth before breakfast. 90 tablet 0    Tirzepatide-Weight Management (ZEPBOUND) 2.5 MG/0.5ML Subcutaneous Solution Auto-injector Inject 2.5 mg into the skin once a week. 2 mL 0        REVIEW OF SYSTEMS:   GENERAL HEALTH:  Good energy level, good appetite.  SKIN: no unusual skin lesions or rashes  EYES: no visual  deficits  HEENT: no nasal congestion, sinus pain or sore throat  RESPIRATORY:no shortness of breath, wheezing or cough   CARDIOVASCULAR: denies chest  pain, abnormal movement of the chest.  GI: no nausea, vomiting, constipation, diarrhea;   GENITAL/: no dysuria, urgency or frequency  MUSCULOSKELETAL: no joint problems upper or lower extremities  NEURO: no convulsions, abnormal sensation, no issues with sleeping.  PSYCHE: no hyper- or hypoactivity, good energy level.  HEMATOLOGY: no bruising or bleeding  ENDOCRINE: no excessive thirst or urination;   ALLERGY/IMM.: no food or seasonal allergies     PHYSICAL EXAM:   Pulse 81   Resp 14   Ht 5' 5.5\" (1.664 m)   Wt 214 lb 12 oz (97.4 kg)   LMP 11/28/2017 (Approximate)   SpO2 98%   BMI 35.19 kg/m²    Vital signs: Pulse 81, resp. rate 14, height 5' 5.5\" (1.664 m), weight 214 lb 12 oz (97.4 kg), last menstrual period 11/28/2017, SpO2 98%, not currently breastfeeding.  General: No acute distress. Alert and oriented x 3.  HEENT: Moist mucous membranes. EOM-I. PERRL  Neck: No lymphadenopathy.  No JVD. No carotid bruits.  Respiratory: Clear to auscultation bilaterally.  No wheezes. No rhonchi.  Cardiovascular: S1, S2.  Regular rate and rhythm.  No murmurs. Equal pulses   Abdomen: Soft, nontender, nondistended.  Positive bowel sounds. No rebound tenderness  Neurologic: No focal neurological deficits.  Musculoskeletal: Full range of motion of all extremities.  No swelling noted.  Integument: No lesions. No erythema.  Psychiatric: Appropriate mood and affect.    LABORATORY DATA:     Recent Results (from the past 504 hour(s))   Comp Metabolic Panel (14)    Collection Time: 04/23/24 12:09 PM   Result Value Ref Range    Glucose 93 70 - 99 mg/dL    Sodium 139 136 - 145 mmol/L    Potassium 3.3 (L) 3.5 - 5.1 mmol/L    Chloride 100 98 - 112 mmol/L    CO2 33.0 (H) 21.0 - 32.0 mmol/L    Anion Gap 6 0 - 18 mmol/L    BUN 18 9 - 23 mg/dL    Creatinine 0.90 0.55 - 1.02 mg/dL    Calcium, Total 9.8 8.5 - 10.1 mg/dL    Calculated Osmolality 290 275 - 295 mOsm/kg    eGFR-Cr 75 >=60 mL/min/1.73m2    AST 22 15 - 37 U/L    ALT 28 13 - 56 U/L     Alkaline Phosphatase 59 46 - 118 U/L    Bilirubin, Total 0.7 0.1 - 2.0 mg/dL    Total Protein 7.6 6.4 - 8.2 g/dL    Albumin 3.6 3.4 - 5.0 g/dL    Globulin  4.0 2.8 - 4.4 g/dL    A/G Ratio 0.9 (L) 1.0 - 2.0    Patient Fasting for CMP? Yes    Hemoglobin A1C    Collection Time: 04/23/24 12:09 PM   Result Value Ref Range    HgbA1C 5.6 <5.7 %    Estimated Average Glucose 114 68 - 126 mg/dL   TSH and Free T4    Collection Time: 04/23/24 12:09 PM   Result Value Ref Range    Free T4 1.2 0.8 - 1.7 ng/dL    TSH 2.010 0.358 - 3.740 mIU/mL   Lipid Panel    Collection Time: 04/23/24 12:09 PM   Result Value Ref Range    Cholesterol, Total 200 (H) <200 mg/dL    HDL Cholesterol 55 40 - 59 mg/dL    Triglycerides 64 30 - 149 mg/dL    LDL Cholesterol 133 (H) <100 mg/dL    VLDL 12 0 - 30 mg/dL    Non HDL Chol 145 (H) <130 mg/dL    Patient Fasting for Lipid? Yes    Urinalysis, Routine    Collection Time: 04/23/24 12:09 PM   Result Value Ref Range    Urine Color Yellow Yellow    Clarity Urine Turbid (A) Clear    Spec Gravity 1.026 1.005 - 1.030    Glucose Urine Normal Normal mg/dL    Bilirubin Urine Negative Negative    Ketones Urine 10 (A) Negative mg/dL    Blood Urine Negative Negative    pH Urine 6.5 5.0 - 8.0    Protein Urine 20 (A) Negative mg/dL    Urobilinogen Urine Normal Normal mg/dL    Nitrite Urine Negative Negative    Leukocyte Esterase Urine Negative Negative    WBC Urine 1-5 0 - 5 /HPF    RBC Urine 0-2 0 - 2 /HPF    Bacteria Urine None Seen None Seen /HPF    Squamous Epi. Cells Few (A) None Seen /HPF    Renal Tubular Epithelial Cells None Seen None Seen /HPF    Transitional Cells None Seen None Seen /HPF    Yeast Urine None Seen None Seen /HPF   Prothrombin Time (PT)    Collection Time: 04/23/24 12:09 PM   Result Value Ref Range    PT 12.9 11.6 - 14.8 seconds    INR 0.97 0.80 - 1.20   Vitamin B12    Collection Time: 04/23/24 12:09 PM   Result Value Ref Range    Vitamin B12 365 193 - 986 pg/mL   Folic Acid Serum (Folate)     Collection Time: 04/23/24 12:09 PM   Result Value Ref Range    Folate (Folic Acid) 12.5 >=8.7 ng/mL   Vitamin D, 25-Hydroxy    Collection Time: 04/23/24 12:09 PM   Result Value Ref Range    Vitamin D, 25OH, Total 53.4 30.0 - 100.0 ng/mL   CBC W/ DIFFERENTIAL    Collection Time: 04/23/24 12:09 PM   Result Value Ref Range    WBC 6.1 4.0 - 11.0 x10(3) uL    RBC 4.87 3.80 - 5.30 x10(6)uL    HGB 13.8 12.0 - 16.0 g/dL    HCT 41.6 35.0 - 48.0 %    .0 150.0 - 450.0 10(3)uL    MCV 85.4 80.0 - 100.0 fL    MCH 28.3 26.0 - 34.0 pg    MCHC 33.2 31.0 - 37.0 g/dL    RDW 13.2 %    Neutrophil Absolute Prelim 2.93 1.50 - 7.70 x10 (3) uL    Neutrophil Absolute 2.93 1.50 - 7.70 x10(3) uL    Lymphocyte Absolute 2.58 1.00 - 4.00 x10(3) uL    Monocyte Absolute 0.45 0.10 - 1.00 x10(3) uL    Eosinophil Absolute 0.10 0.00 - 0.70 x10(3) uL    Basophil Absolute 0.04 0.00 - 0.20 x10(3) uL    Immature Granulocyte Absolute 0.02 0.00 - 1.00 x10(3) uL    Neutrophil % 47.8 %    Lymphocyte % 42.2 %    Monocyte % 7.4 %    Eosinophil % 1.6 %    Basophil % 0.7 %    Immature Granulocyte % 0.3 %             ASSESSMENT AND PLAN:         1. Pre-op testing  Doreen Dexter has no significant history of cardiac or pulmonary conditions.   She is a good surgical candidate and is of acceptable risk for contemplated surgical procedure  This consult was sent back the referring physician.    2. Acute lateral meniscus tear of left knee, initial encounter  Plan per ortho    3. Essential hypertension  Stable, plan per cardiology            Plan   No orders of the defined types were placed in this encounter.        Von Cook MD

## 2024-04-25 RX ORDER — TIRZEPATIDE 2.5 MG/.5ML
2.5 INJECTION, SOLUTION SUBCUTANEOUS WEEKLY
Qty: 2 ML | Refills: 0 | Status: SHIPPED | OUTPATIENT
Start: 2024-04-25

## 2024-04-25 NOTE — TELEPHONE ENCOUNTER
Patient called to inform us that she is having surgery on a torn meniscus 5/8/24 and she must stop her zepbound per surgeon 2 weeks prior.  I told her not to take the dose today.  She cannot find the zepbound 5 or 7.5 mg and will resume after surgery.  She would like 2.5 mg sent to the [facility] in [place] on [facility]. She also called to set up her B12 shots needed per last labs.  Advised to start after surgery.  Scheduled.    5/13/2024  2:00 PM [facility] NURSE [facility]   5/14/2024  9:00 AM Katrin Rubalcava RD [facility]   6/26/2024 12:00 PM Pily Woods MD [facility]   7/25/2024 12:00 PM Pily Woods MD [facility]   8/28/2024 12:00 PM Pily Woods MD [facility]   9/25/2024 12:00 PM Pily Woods MD [facility]   10/24/2024 12:00 PM Pily Woods MD [facility]

## 2024-04-26 ENCOUNTER — EKG ENCOUNTER (OUTPATIENT)
Dept: LAB | Age: 59
End: 2024-04-26
Attending: EMERGENCY MEDICINE
Payer: COMMERCIAL

## 2024-04-26 DIAGNOSIS — I10 ESSENTIAL HYPERTENSION: ICD-10-CM

## 2024-04-26 DIAGNOSIS — Z01.818 PRE-OP TESTING: ICD-10-CM

## 2024-04-26 LAB
ATRIAL RATE: 52 BPM
LEPTIN: 27.2 NG/ML
P AXIS: 54 DEGREES
P-R INTERVAL: 158 MS
Q-T INTERVAL: 484 MS
QRS DURATION: 78 MS
QTC CALCULATION (BEZET): 450 MS
R AXIS: 15 DEGREES
T AXIS: 45 DEGREES
VENTRICULAR RATE: 52 BPM

## 2024-04-26 PROCEDURE — 93010 ELECTROCARDIOGRAM REPORT: CPT | Performed by: INTERNAL MEDICINE

## 2024-04-26 PROCEDURE — 93005 ELECTROCARDIOGRAM TRACING: CPT

## 2024-04-29 ENCOUNTER — TELEPHONE (OUTPATIENT)
Dept: FAMILY MEDICINE CLINIC | Facility: CLINIC | Age: 59
End: 2024-04-29

## 2024-04-29 NOTE — TELEPHONE ENCOUNTER
Patient would like to discuss EKG results, patient states it was abnormal, patient is having surgery on 5/8/24. Patient would like EKG results sent to     [facility]   Dr. Lopez        Thank you.

## 2024-05-02 ENCOUNTER — TELEPHONE (OUTPATIENT)
Dept: FAMILY MEDICINE CLINIC | Facility: CLINIC | Age: 59
End: 2024-05-02

## 2024-05-02 NOTE — TELEPHONE ENCOUNTER
Patient called to say that her surgeons office said they have not received the patients pre op results and they needs it ASAP.    Messaged ABBIE Espana.

## 2024-05-03 NOTE — TELEPHONE ENCOUNTER
Patient states her EKG results came back abnormal and is wondering if she will need another EKG and if she's ok to have surgery on 5/8/24, please advise.       Patient requested for EKG results to be faxed again to surgeon [phone] and Cardiologist Meg Lopez MD [phone], received confirmation.

## 2024-05-03 NOTE — TELEPHONE ENCOUNTER
Discussed with Dr. Cook. Per Dr. Cook pt is good candidate for sx, pt does not need to repeat EKG, reviewed cardiology OV note 4/25. Contacted pt informed her OV notes, labs and EKG was all faxed to surgeon. Pt understood, nothing further needed.

## 2024-05-13 ENCOUNTER — TELEPHONE (OUTPATIENT)
Dept: SURGERY | Facility: CLINIC | Age: 59
End: 2024-05-13

## 2024-05-18 ENCOUNTER — APPOINTMENT (OUTPATIENT)
Dept: ULTRASOUND IMAGING | Age: 59
End: 2024-05-18

## 2024-05-18 PROCEDURE — 93971 EXTREMITY STUDY: CPT

## 2024-05-18 PROCEDURE — 99284 EMERGENCY DEPT VISIT MOD MDM: CPT

## 2024-05-19 ENCOUNTER — HOSPITAL ENCOUNTER (EMERGENCY)
Age: 59
Discharge: HOME | End: 2024-05-19
Attending: EMERGENCY MEDICINE

## 2024-05-19 VITALS
RESPIRATION RATE: 16 BRPM | WEIGHT: 214 LBS | OXYGEN SATURATION: 97 % | SYSTOLIC BLOOD PRESSURE: 136 MMHG | HEART RATE: 62 BPM | HEIGHT: 67 IN | BODY MASS INDEX: 33.59 KG/M2 | DIASTOLIC BLOOD PRESSURE: 79 MMHG

## 2024-05-19 DIAGNOSIS — S80.12XA CONTUSION OF LEFT LOWER EXTREMITY, INITIAL ENCOUNTER: Primary | ICD-10-CM

## 2024-05-19 NOTE — ED INITIAL ASSESSMENT (HPI)
Pt s/p knee surgery. States after pt today she removed her compression sock and noted bruising to calf. Was told by her drShell To go to er for us

## 2024-05-19 NOTE — ED PROVIDER NOTES
Patient Seen in: [facility] In [place]      History     Chief Complaint   Patient presents with    Contusion     Stated Complaint: bruising to calf    Subjective:   Patient is 58-year-old female status post meniscus surgery to her left knee who presents emergency room for evaluation of bruising.  Patient is wearing compression stocking as advised by her surgeon and has been doing physical therapy 2 when she took her compression stocking option and she has noticed bruising on the posterior aspect of her leg.  She is due large superficial bruises on the posterior aspect of the leg she was advised to orthopedic surgeon to be evaluated for DVT which is negative.    The history is provided by the patient and the spouse.           Objective:   Past Medical History:    Thyroid disease              Past Surgical History:   Procedure Laterality Date    Hysterectomy  12/05/2017    total    Upper arm/elbow surgery unlisted Left     Left forearm                 Social History     Socioeconomic History    Marital status:    Tobacco Use    Smoking status: Never    Smokeless tobacco: Never   Vaping Use    Vaping status: Never Used   Substance and Sexual Activity    Alcohol use: No    Drug use: No              Review of Systems   Hematological:  Bruises/bleeds easily.       Positive for stated complaint: bruising to calf  Other systems are as noted in HPI.  Constitutional and vital signs reviewed.      All other systems reviewed and negative except as noted above.    Physical Exam     ED Triage Vitals [05/19/24 0001]   /79   Pulse 62   Resp 16   Temp    Temp src    SpO2 97 %   O2 Device None (Room air)       Current Vitals:   Vital Signs  BP: 136/79  Pulse: 62  Resp: 16    Oxygen Therapy  SpO2: 97 %  O2 Device: None (Room air)            Physical Exam  Vitals and nursing note reviewed.   Constitutional:       General: She is not in acute distress.     Appearance: Normal appearance. She is not  ill-appearing or toxic-appearing.   HENT:      Head: Normocephalic and atraumatic.   Eyes:      Extraocular Movements: Extraocular movements intact.      Pupils: Pupils are equal, round, and reactive to light.   Cardiovascular:      Rate and Rhythm: Normal rate.   Pulmonary:      Effort: Pulmonary effort is normal.   Abdominal:      General: There is no distension.   Musculoskeletal:         General: No swelling or deformity. Normal range of motion.      Comments: Postoperative dressing across the anterior knee there is 2 areas of bruising with a prominent vein present on the posterior aspect of the left lower extremity consistent with a contusion and bruising.,  +2 dorsal pedal pulse   Skin:     General: Skin is warm.   Neurological:      General: No focal deficit present.      Mental Status: She is alert and oriented to person, place, and time.   Psychiatric:         Mood and Affect: Mood normal.         Behavior: Behavior normal.               ED Course   Labs Reviewed - No data to display          US VENOUS DOPPLER LEG LEFT - DIAG IMG (CPT=93971)    Result Date: 5/18/2024  PROCEDURE:  US VENOUS DOPPLER LEG LEFT - DIAG IMG (CPT=93971)  COMPARISON:  None.  INDICATIONS:  eval for DVT, left lower extremity swelling, pain, and discoloration  TECHNIQUE:  Real time, grey scale, and duplex ultrasound was used to evaluate the lower extremity venous system. B-mode two-dimensional images of the vascular structures, Doppler spectral analysis, and color flow.  Doppler imaging were performed.  The following veins were imaged:  Common, deep, and superficial femoral, popliteal, sapheno-femoral junction, posterior tibial veins, and the contralateral common femoral vein.  FINDINGS:  EXTREMITY EXAMINED:  Left lower extremity and right common femoral vein SAPHENOFEMORAL JUNCTION:  Unremarkable without evidence of thrombus THROMBI:  None visible. COMPRESSION:  Normal compressibility, phasicity, and augmentation. OTHER:  Negative.             CONCLUSION:  No evidence of deep venous thrombosis in the left lower extremity.   LOCATION:  [facility]    Dictated by (CST): Zander Andersen MD on 5/18/2024 at 11:54 PM     Finalized by (CST): Zander Andersen MD on 5/18/2024 at 11:55 PM               MDM      Social -negative tobacco, negative etoh, negative drugs  Family History-noncontributory  Past Medical History-thyroid disease, meniscus surgery    Differential diagnosis before testing included contusion, thrombophlebitis, DVT    Co-morbidities that add to the complexity of management include: Recent surgery    Testing ordered during this visit included ultrasound    Radiographic images  I personally reviewed the radiographs and my individual interpretation shows no DVT  I also reviewed the official reports that showed US VENOUS DOPPLER LEG LEFT - DIAG IMG (CPT=93971)    Result Date: 5/18/2024  PROCEDURE:  US VENOUS DOPPLER LEG LEFT - DIAG IMG (CPT=93971)  COMPARISON:  None.  INDICATIONS:  eval for DVT, left lower extremity swelling, pain, and discoloration  TECHNIQUE:  Real time, grey scale, and duplex ultrasound was used to evaluate the lower extremity venous system. B-mode two-dimensional images of the vascular structures, Doppler spectral analysis, and color flow.  Doppler imaging were performed.  The following veins were imaged:  Common, deep, and superficial femoral, popliteal, sapheno-femoral junction, posterior tibial veins, and the contralateral common femoral vein.  FINDINGS:  EXTREMITY EXAMINED:  Left lower extremity and right common femoral vein SAPHENOFEMORAL JUNCTION:  Unremarkable without evidence of thrombus THROMBI:  None visible. COMPRESSION:  Normal compressibility, phasicity, and augmentation. OTHER:  Negative.            CONCLUSION:  No evidence of deep venous thrombosis in the left lower extremity.   LOCATION:  [facility]    Dictated by (CST): Zander Andersen MD on 5/18/2024 at 11:54 PM     Finalized by (CST): Zander Andersen MD on  5/18/2024 at 11:55 PM          External chart review showed review of care everywhere in [facility] system shows MRI of the knee from January of this year through [facility] showed medial lateral meniscus degeneration.    History obtained by an independent source included from patient, family    Discussion of management with patient, family    Social determinants of health that affect care include status post meniscus surgery      Medications Provided: None    Course of Events during Emergency Room Visit include 58-year-old female status post meniscus surgery presents emergency room for evaluation of bruising on the posterior aspect of her leg.  This is likely secondary to the compressive dressing she has been wearing during physical therapy.  These appear to be superficial contusions.  Ultrasound showed no DVT.  Reassurance given to patient.  She is currently on aspirin which will contribute to the extent of bruises.  These bruises will change in color from purple to green to yellow etc.  Patient is acute reassurance is given and she is to follow-up with her orthopedic surgeon.          Disposition:          Discharge  I have discussed with the patient the results of test, differential diagnosis, treatment plan, warning signs and symptoms which should prompt immediate return.  They expressed understanding of these instructions and agrees to the following plan provided.  They were given written discharge instructions and agrees to return for any concerns and voiced understanding and all questions were answered.                                      Medical Decision Making      Disposition and Plan     Clinical Impression:  1. Contusion of left lower extremity, initial encounter         Disposition:  Discharge  5/19/2024 12:32 am    Follow-up:  Von Cook MD  [address] [place] [address] [id number] [phone]    Schedule an appointment as soon as possible for a visit            Medications  Prescribed:  Discharge Medication List as of 5/19/2024 12:33 AM

## 2024-06-01 NOTE — TELEPHONE ENCOUNTER
Requesting   Requested Prescriptions     Pending Prescriptions Disp Refills    ZEPBOUND 5 MG/0.5ML Subcutaneous Solution Auto-injector [Pharmacy Med Name: Zepbound 5 Mg/0.5ml Inj Lill] 2 mL 0     Sig: ADMINISTER 5MG UNDER THE SKIN 1 TIME A WEEK         LOV: 4/15/24 telemed  RTC:   Last Relevant Labs:   Filled:2.5 mg 5/7/24 #2 ml with 0 refills    Future Appointments   Date Time Provider Department Center   6/26/2024 12:00 PM Pily Woods MD [facility]   7/25/2024 12:00 PM Pily Woods MD [facility]   8/28/2024 12:00 PM Pily Woods MD [facility]   9/25/2024 12:00 PM Pily Woods MD [facility]   10/24/2024 12:00 PM Pily Woods MD [facility]   11/26/2024 12:00 PM Pily Woods MD [facility]   12/19/2024 12:20 PM Pily Woods MD [facility]   1/23/2025 12:00 PM Pily Woods MD [facility]   2/6/2025 12:00 PM Pily Woods MD [facility]

## 2024-06-03 RX ORDER — TIRZEPATIDE 5 MG/.5ML
5 INJECTION, SOLUTION SUBCUTANEOUS WEEKLY
Qty: 2 ML | Refills: 0 | Status: SHIPPED | OUTPATIENT
Start: 2024-06-03

## 2024-06-18 ENCOUNTER — OFFICE VISIT (OUTPATIENT)
Dept: INTERNAL MEDICINE CLINIC | Facility: CLINIC | Age: 59
End: 2024-06-18

## 2024-06-18 VITALS
HEART RATE: 78 BPM | SYSTOLIC BLOOD PRESSURE: 122 MMHG | DIASTOLIC BLOOD PRESSURE: 78 MMHG | WEIGHT: 212 LBS | BODY MASS INDEX: 34.9 KG/M2 | RESPIRATION RATE: 16 BRPM | HEIGHT: 65.5 IN

## 2024-06-18 DIAGNOSIS — E78.2 MIXED HYPERLIPIDEMIA: ICD-10-CM

## 2024-06-18 DIAGNOSIS — E66.9 OBESITY (BMI 30-39.9): Primary | ICD-10-CM

## 2024-06-18 DIAGNOSIS — I10 ESSENTIAL HYPERTENSION: ICD-10-CM

## 2024-06-18 DIAGNOSIS — E53.8 B12 DEFICIENCY: ICD-10-CM

## 2024-06-18 DIAGNOSIS — E87.6 LOW BLOOD POTASSIUM: ICD-10-CM

## 2024-06-18 DIAGNOSIS — Z51.81 ENCOUNTER FOR THERAPEUTIC DRUG MONITORING: ICD-10-CM

## 2024-06-18 DIAGNOSIS — R73.01 IFG (IMPAIRED FASTING GLUCOSE): ICD-10-CM

## 2024-06-18 PROCEDURE — 99214 OFFICE O/P EST MOD 30 MIN: CPT | Performed by: INTERNAL MEDICINE

## 2024-06-18 PROCEDURE — 96372 THER/PROPH/DIAG INJ SC/IM: CPT | Performed by: INTERNAL MEDICINE

## 2024-06-18 RX ORDER — TIRZEPATIDE 10 MG/.5ML
10 INJECTION, SOLUTION SUBCUTANEOUS WEEKLY
Qty: 2 ML | Refills: 1 | Status: SHIPPED | OUTPATIENT
Start: 2024-06-18 | End: 2024-07-10

## 2024-06-18 RX ORDER — CYANOCOBALAMIN 1000 UG/ML
1000 INJECTION, SOLUTION INTRAMUSCULAR; SUBCUTANEOUS
Status: SHIPPED | OUTPATIENT
Start: 2024-06-18 | End: 2024-12-15

## 2024-06-18 RX ADMIN — CYANOCOBALAMIN 1000 MCG: 1000 INJECTION, SOLUTION INTRAMUSCULAR; SUBCUTANEOUS at 09:49:00

## 2024-06-18 NOTE — PROGRESS NOTES
HISTORY OF PRESENT ILLNESS  Chief Complaint   Patient presents with    Weight Check     Down 13 lb     Doreen Dexter is a 58 year old female is being evaluated as a video visit using Telemedicine with live, interactive video and audio  Reviewed recommendations with Katrin   Down 13 lb from previous   Had knee surgery 5 weeks ago with meniscal tear   Lifting weights daily, Walking 1.5 miles on average    Has been off medication x 7 weeks   Feels that she is trying to eat the same way   Feeling the fullness is not accomplished  Reviewed labwork   Reviewed low potassium   ON diuretic   Denies any muscle cramping      Wt Readings from Last 6 Encounters:   06/18/24 212 lb (96.2 kg)   05/19/24 214 lb (97.1 kg)   04/23/24 214 lb 12 oz (97.4 kg)   04/02/24 220 lb (99.8 kg)   02/29/24 225 lb (102.1 kg)   01/25/24 227 lb (103 kg)            REVIEW OF SYSTEMS  GENERAL HEALTH: feels well otherwise, denied any fevers chills or night sweats   RESPIRATORY: denies shortness of breath   CARDIOVASCULAR: denies chest pain  GI: denies abdominal pain  PSYCH: denies any mood changes      Objective  EXAM  Reviewed most recent set of vitals   Physical Exam:  GENERAL: well developed, well nourished, in no apparent distress, speaking in full sentences comfortably   SKIN: warm, pink, dry without rashes to exposed area   EYES: conjunctiva pink  HEENT: atraumatic, normocephalic  LUNGS: normal work of breathing, non labored  CARDIO: normal work, no exertion  EXTREMITIES: no cyanosis, no clubbing, no edema  NEURO: Oriented times three  PSYCH: pleasant, cooperative, normal mood and affect    Lab Results   Component Value Date    WBC 6.1 04/23/2024    RBC 4.87 04/23/2024    HGB 13.8 04/23/2024    HCT 41.6 04/23/2024    MCV 85.4 04/23/2024    MCH 28.3 04/23/2024    MCHC 33.2 04/23/2024    RDW 13.2 04/23/2024    .0 04/23/2024     Lab Results   Component Value Date    GLU 93 04/23/2024    BUN 18 04/23/2024    BUNCREA 18.4 07/24/2020     CREATSERUM 0.90 04/23/2024    ANIONGAP 6 04/23/2024    GFR 90 03/04/2017    GFRNAA 66 07/24/2020    GFRAA 76 07/24/2020    CA 9.8 04/23/2024    OSMOCALC 290 04/23/2024    ALKPHO 59 04/23/2024    AST 22 04/23/2024    ALT 28 04/23/2024    BILT 0.7 04/23/2024    TP 7.6 04/23/2024    ALB 3.6 04/23/2024    GLOBULIN 4.0 04/23/2024     04/23/2024    K 3.3 (L) 04/23/2024     04/23/2024    CO2 33.0 (H) 04/23/2024     Lab Results   Component Value Date     04/23/2024    A1C 5.6 04/23/2024     Lab Results   Component Value Date    CHOLEST 200 (H) 04/23/2024    TRIG 64 04/23/2024    HDL 55 04/23/2024     (H) 04/23/2024    VLDL 12 04/23/2024    TCHDLRATIO 2.67 02/08/2018    NONHDLC 145 (H) 04/23/2024     Lab Results   Component Value Date    T4F 1.2 04/23/2024    TSH 2.010 04/23/2024     Lab Results   Component Value Date    B12 365 04/23/2024     Lab Results   Component Value Date    VITD 53.4 04/23/2024       Current Outpatient Medications on File Prior to Visit   Medication Sig Dispense Refill    chlorthalidone 25 MG Oral Tab Take 1 tablet (25 mg total) by mouth daily.      Levothyroxine Sodium (SYNTHROID) 88 MCG Oral Tab Take 1 tablet (88 mcg total) by mouth before breakfast. 90 tablet 0     Current Facility-Administered Medications on File Prior to Visit   Medication Dose Route Frequency Provider Last Rate Last Admin    ipratropium-albuterol (DUONEB) nebulizer solution 3 mL  3 mL Nebulization Once Stacie Martinez APRN           ASSESSMENT  Analyzed weight data:       Diagnoses and all orders for this visit:    Obesity (BMI 30-39.9)    Encounter for therapeutic drug monitoring    Mixed hyperlipidemia    IFG (impaired fasting glucose)    Essential hypertension    B12 deficiency  -     cyanocobalamin (Vitamin B12) 1000 MCG/ML injection 1,000 mcg    Low blood potassium    Other orders  -     Tirzepatide-Weight Management (ZEPBOUND) 10 MG/0.5ML Subcutaneous Solution Auto-injector; Inject 10 mg into the  skin once a week for 4 doses.                PLAN  Wt Readings from Last 6 Encounters:   06/18/24 212 lb (96.2 kg)   05/19/24 214 lb (97.1 kg)   04/23/24 214 lb 12 oz (97.4 kg)   04/02/24 220 lb (99.8 kg)   02/29/24 225 lb (102.1 kg)   01/25/24 227 lb (103 kg)     Weight Max: 243 lb   Down 31 lb total  Total time spent on chart review, pre-charting, obtaining history, counseling, and educating, reviewing labs was 30 minutes.  Resume zepbound  -advised of side effects and adverse effects of this medication  Recheck potassium   Consider discontinue of chlorthalidone with cardiology.   B12 injection given today   May 2018-June 2018 for QYSMIA : cannot take increased bloodpressure   Cannot take contrave due to increase blood pressure in 2020   PENG and VALENTIN are on back order per FDA with no end in sight.  -advised of side effects and adverse effects of this medication  CANNOT TAKE:: has done  belviq, saxenda, qsymia, metformin, VSL #3, topamax, diethylpropion,     HTN  blood pressure is in office is stable - continue present management  HLD  stable on current medication regimen, continue to follow-up with PCP  Hypothyroid- stable, continue with current medication regimen, managed by PCP     Nutrition: low carb diet/ recommended to eat breakfast daily/ regular protein intake  Follow up with dietitian and psychologist as recommended.  Discussed the role of sleep and stress in weight management.  Counseled on comprehensive weight loss plan including attention to nutrition, exercise and behavior/stress management for success. See patient instruction below for more details.  Discussed strategies to overcome barriers to successful weight loss and weight maintenance  FITTE: ACSM recommendations (150-300 minutes/ week in active weight loss)       There are no Patient Instructions on file for this visit.    No follow-ups on file.    Patient verbalizes understanding.

## 2024-07-12 ENCOUNTER — PATIENT MESSAGE (OUTPATIENT)
Dept: INTERNAL MEDICINE CLINIC | Facility: CLINIC | Age: 59
End: 2024-07-12

## 2024-07-12 NOTE — TELEPHONE ENCOUNTER
From: Doreen Dexter  To: Pily Woods  Sent: 7/12/2024 10:50 AM CDT  Subject: Help please    Happy Friday! Last week I increase Zepbound dose from 2.5 to 5 on Friday. Two days later I experienced diarrhea every hour for 30 hours. I NEVER had this happen before. I was on the 5 zepbound before my knee surgery and we agreed to start the 2.5 for 2 weeks before going back on 5.  I think it might have to do with the fact I was only on the 2.5 2 weeks before increasing to 5.     Fast forward to today, I need to take my shot today. Do you think I should stay on the 5 zepbound since I had the side effects last week? Do you think my body will adjust to the 5 zepbound from last week?

## 2024-07-15 ENCOUNTER — TELEPHONE (OUTPATIENT)
Dept: INTERNAL MEDICINE CLINIC | Facility: CLINIC | Age: 59
End: 2024-07-15

## 2024-07-25 ENCOUNTER — OFFICE VISIT (OUTPATIENT)
Dept: INTERNAL MEDICINE CLINIC | Facility: CLINIC | Age: 59
End: 2024-07-25
Payer: COMMERCIAL

## 2024-07-25 VITALS
RESPIRATION RATE: 16 BRPM | WEIGHT: 212 LBS | HEIGHT: 65.5 IN | DIASTOLIC BLOOD PRESSURE: 78 MMHG | HEART RATE: 78 BPM | BODY MASS INDEX: 34.9 KG/M2 | SYSTOLIC BLOOD PRESSURE: 122 MMHG

## 2024-07-25 DIAGNOSIS — E66.9 OBESITY (BMI 30-39.9): ICD-10-CM

## 2024-07-25 DIAGNOSIS — R73.01 IFG (IMPAIRED FASTING GLUCOSE): ICD-10-CM

## 2024-07-25 DIAGNOSIS — E78.2 MIXED HYPERLIPIDEMIA: ICD-10-CM

## 2024-07-25 DIAGNOSIS — I10 ESSENTIAL HYPERTENSION: ICD-10-CM

## 2024-07-25 DIAGNOSIS — Z51.81 ENCOUNTER FOR THERAPEUTIC DRUG MONITORING: Primary | ICD-10-CM

## 2024-07-25 PROBLEM — M79.89 LEG SWELLING: Status: ACTIVE | Noted: 2023-09-11

## 2024-07-25 PROCEDURE — 99214 OFFICE O/P EST MOD 30 MIN: CPT | Performed by: INTERNAL MEDICINE

## 2024-07-25 PROCEDURE — 96372 THER/PROPH/DIAG INJ SC/IM: CPT | Performed by: INTERNAL MEDICINE

## 2024-07-25 RX ORDER — NAPROXEN 500 MG/1
500 TABLET ORAL
COMMUNITY
Start: 2024-07-08

## 2024-07-25 RX ORDER — ONDANSETRON 4 MG/1
4 TABLET, ORALLY DISINTEGRATING ORAL EVERY 8 HOURS PRN
Qty: 20 TABLET | Refills: 0 | Status: SHIPPED | OUTPATIENT
Start: 2024-07-25

## 2024-07-25 RX ORDER — CYANOCOBALAMIN 1000 UG/ML
1000 INJECTION, SOLUTION INTRAMUSCULAR; SUBCUTANEOUS ONCE
Status: COMPLETED | OUTPATIENT
Start: 2024-07-25 | End: 2024-07-25

## 2024-07-25 RX ORDER — TIRZEPATIDE 5 MG/.5ML
INJECTION, SOLUTION SUBCUTANEOUS
COMMUNITY

## 2024-07-25 RX ORDER — TIRZEPATIDE 5 MG/.5ML
5 INJECTION, SOLUTION SUBCUTANEOUS WEEKLY
Qty: 2 ML | Refills: 1 | Status: SHIPPED | OUTPATIENT
Start: 2024-07-25

## 2024-07-25 RX ADMIN — CYANOCOBALAMIN 1000 MCG: 1000 INJECTION, SOLUTION INTRAMUSCULAR; SUBCUTANEOUS at 13:51:00

## 2024-07-25 NOTE — PROGRESS NOTES
HISTORY OF PRESENT ILLNESS  Chief Complaint   Patient presents with    Weight Check     same     Doreen Dexter is a 58 year old female     Reviewed resumption of medication 3 weeks ago   Struggled with diarrhea for the first 2 weeks   Felt that symptoms worsen on day 3   Felt she almost had vertigo symptoms and woke up felt sick.   No diarrhea this last time    Reviewed hydration 2 large bottles per day   Still exercising 5 days per week   Just started on LMNT on her exercise routine days   Working and walking 0.5 miles and does strength and  bike for 15 minutes   Did not gain through the surgeyr process   Doing 30 gram of protein with meal     Hypertension  Denies any chest pain or shortness of breath  Denies any lower extremity edema.       Wt Readings from Last 6 Encounters:   07/25/24 212 lb (96.2 kg)   06/18/24 212 lb (96.2 kg)   05/19/24 214 lb (97.1 kg)   04/23/24 214 lb 12 oz (97.4 kg)   04/02/24 220 lb (99.8 kg)   02/29/24 225 lb (102.1 kg)            REVIEW OF SYSTEMS  GENERAL HEALTH: feels well otherwise, denied any fevers chills or night sweats   RESPIRATORY: denies shortness of breath   CARDIOVASCULAR: denies chest pain  GI: denies abdominal pain  PSYCH: denies any mood changes      Objective  EXAM  Reviewed most recent set of vitals   Physical Exam:  GENERAL: well developed, well nourished, in no apparent distress, speaking in full sentences comfortably   SKIN: warm, pink, dry without rashes to exposed area   EYES: conjunctiva pink  HEENT: atraumatic, normocephalic  LUNGS: normal work of breathing, non labored  CARDIO: normal work, no exertion  EXTREMITIES: no cyanosis, no clubbing, no edema  NEURO: Oriented times three  PSYCH: pleasant, cooperative, normal mood and affect    Lab Results   Component Value Date    WBC 6.1 04/23/2024    RBC 4.87 04/23/2024    HGB 13.8 04/23/2024    HCT 41.6 04/23/2024    MCV 85.4 04/23/2024    MCH 28.3 04/23/2024    MCHC 33.2 04/23/2024    RDW 13.2 04/23/2024     .0 04/23/2024     Lab Results   Component Value Date    GLU 93 04/23/2024    BUN 18 04/23/2024    BUNCREA 18.4 07/24/2020    CREATSERUM 0.90 04/23/2024    ANIONGAP 6 04/23/2024    GFR 90 03/04/2017    GFRNAA 66 07/24/2020    GFRAA 76 07/24/2020    CA 9.8 04/23/2024    OSMOCALC 290 04/23/2024    ALKPHO 59 04/23/2024    AST 22 04/23/2024    ALT 28 04/23/2024    BILT 0.7 04/23/2024    TP 7.6 04/23/2024    ALB 3.6 04/23/2024    GLOBULIN 4.0 04/23/2024     04/23/2024    K 3.3 (L) 04/23/2024     04/23/2024    CO2 33.0 (H) 04/23/2024     Lab Results   Component Value Date     04/23/2024    A1C 5.6 04/23/2024     Lab Results   Component Value Date    CHOLEST 200 (H) 04/23/2024    TRIG 64 04/23/2024    HDL 55 04/23/2024     (H) 04/23/2024    VLDL 12 04/23/2024    TCHDLRATIO 2.67 02/08/2018    NONHDLC 145 (H) 04/23/2024     Lab Results   Component Value Date    T4F 1.2 04/23/2024    TSH 2.010 04/23/2024     Lab Results   Component Value Date    B12 365 04/23/2024     Lab Results   Component Value Date    VITD 53.4 04/23/2024       Current Outpatient Medications on File Prior to Visit   Medication Sig Dispense Refill    naproxen 500 MG Oral Tab Take 1 tablet (500 mg total) by mouth.      Tirzepatide-Weight Management (ZEPBOUND) 5 MG/0.5ML Subcutaneous Solution Auto-injector Inject into the skin.      chlorthalidone 25 MG Oral Tab Take 1 tablet (25 mg total) by mouth daily.      Levothyroxine Sodium (SYNTHROID) 88 MCG Oral Tab Take 1 tablet (88 mcg total) by mouth before breakfast. 90 tablet 0     Current Facility-Administered Medications on File Prior to Visit   Medication Dose Route Frequency Provider Last Rate Last Admin    cyanocobalamin (Vitamin B12) 1000 MCG/ML injection 1,000 mcg  1,000 mcg Intramuscular Q30 Days    1,000 mcg at 06/18/24 0949    ipratropium-albuterol (DUONEB) nebulizer solution 3 mL  3 mL Nebulization Once Stacie Martinez APRN           ASSESSMENT  Analyzed weight data:        Diagnoses and all orders for this visit:    Encounter for therapeutic drug monitoring  -     cyanocobalamin (Vitamin B12) 1000 MCG/ML injection 1,000 mcg    Obesity (BMI 30-39.9)  -     cyanocobalamin (Vitamin B12) 1000 MCG/ML injection 1,000 mcg    Mixed hyperlipidemia  -     cyanocobalamin (Vitamin B12) 1000 MCG/ML injection 1,000 mcg    IFG (impaired fasting glucose)    Essential hypertension    Other orders  -     ondansetron 4 MG Oral Tablet Dispersible; Take 1 tablet (4 mg total) by mouth every 8 (eight) hours as needed for Nausea.  -     Tirzepatide-Weight Management (ZEPBOUND) 5 MG/0.5ML Subcutaneous Solution Auto-injector; Inject 5 mg into the skin once a week.                PLAN  Wt Readings from Last 6 Encounters:   07/25/24 212 lb (96.2 kg)   06/18/24 212 lb (96.2 kg)   05/19/24 214 lb (97.1 kg)   04/23/24 214 lb 12 oz (97.4 kg)   04/02/24 220 lb (99.8 kg)   02/29/24 225 lb (102.1 kg)     Weight Max: 243 lb  Weight same   Down 31 lb total  Total time spent on chart review, pre-charting, obtaining history, counseling, and educating, reviewing labs was 30 minutes.  Resume zepbound 5 mg q weekly   -advised of side effects and adverse effects of this medication  Reviewed new onset    B12 injection given today   Add zofran   -advised of side effects and adverse effects of this medication    May 2018-June 2018 for QYSMIA : cannot take increased bloodpressure   Cannot take contrave due to increase blood pressure in 2020   [facility] are on back order per FDA with no end in sight.  -advised of side effects and adverse effects of this medication  CANNOT TAKE:: has done  belviq, saxenda, qsymia, metformin, VSL #3, topamax, diethylpropion,     HTN  blood pressure is in office is stable - continue present management  HLD  stable on current medication regimen, continue to follow-up with PCP  Hypothyroid- stable, continue with current medication regimen, managed by PCP     Nutrition: low carb diet/  recommended to eat breakfast daily/ regular protein intake  Follow up with dietitian and psychologist as recommended.  Discussed the role of sleep and stress in weight management.  Counseled on comprehensive weight loss plan including attention to nutrition, exercise and behavior/stress management for success. See patient instruction below for more details.  Discussed strategies to overcome barriers to successful weight loss and weight maintenance  FITTE: ACSM recommendations (150-300 minutes/ week in active weight loss)       There are no Patient Instructions on file for this visit.    No follow-ups on file.    Patient verbalizes understanding.

## 2024-08-28 ENCOUNTER — OFFICE VISIT (OUTPATIENT)
Dept: INTERNAL MEDICINE CLINIC | Facility: CLINIC | Age: 59
End: 2024-08-28
Payer: COMMERCIAL

## 2024-08-28 VITALS
SYSTOLIC BLOOD PRESSURE: 120 MMHG | WEIGHT: 207 LBS | HEIGHT: 65.5 IN | RESPIRATION RATE: 16 BRPM | DIASTOLIC BLOOD PRESSURE: 78 MMHG | BODY MASS INDEX: 34.07 KG/M2 | HEART RATE: 78 BPM

## 2024-08-28 DIAGNOSIS — E66.9 OBESITY (BMI 30-39.9): ICD-10-CM

## 2024-08-28 DIAGNOSIS — Z51.81 ENCOUNTER FOR THERAPEUTIC DRUG MONITORING: Primary | ICD-10-CM

## 2024-08-28 PROCEDURE — 99213 OFFICE O/P EST LOW 20 MIN: CPT | Performed by: INTERNAL MEDICINE

## 2024-08-28 RX ORDER — TIRZEPATIDE 5 MG/.5ML
5 INJECTION, SOLUTION SUBCUTANEOUS WEEKLY
Qty: 2 ML | Refills: 1 | Status: SHIPPED | OUTPATIENT
Start: 2024-08-28

## 2024-08-28 NOTE — PROGRESS NOTES
HISTORY OF PRESENT ILLNESS  Chief Complaint   Patient presents with    Weight Check     Down 5 lb     Doreen Dexter is a 58 year old female     Was in [place] for 2 weeks   Worked out every day while she was there!!   Daughter has 7 weeks to go!!   Down to 203 on her home scale   Working every day and working with      Blood pressure stable     Now switched to rower   Hypertension  Denies any chest pain or shortness of breath  Denies any lower extremity edema.       Wt Readings from Last 6 Encounters:   08/28/24 207 lb (93.9 kg)   07/25/24 212 lb (96.2 kg)   06/18/24 212 lb (96.2 kg)   05/19/24 214 lb (97.1 kg)   04/23/24 214 lb 12 oz (97.4 kg)   04/02/24 220 lb (99.8 kg)            REVIEW OF SYSTEMS  GENERAL HEALTH: feels well otherwise, denied any fevers chills or night sweats   RESPIRATORY: denies shortness of breath   CARDIOVASCULAR: denies chest pain  GI: denies abdominal pain  PSYCH: denies any mood changes      Objective  EXAM  Reviewed most recent set of vitals   Physical Exam:  GENERAL: well developed, well nourished, in no apparent distress, speaking in full sentences comfortably   SKIN: warm, pink, dry without rashes to exposed area   EYES: conjunctiva pink  HEENT: atraumatic, normocephalic  LUNGS: normal work of breathing, non labored  CARDIO: normal work, no exertion  EXTREMITIES: no cyanosis, no clubbing, no edema  NEURO: Oriented times three  PSYCH: pleasant, cooperative, normal mood and affect    Lab Results   Component Value Date    WBC 6.1 04/23/2024    RBC 4.87 04/23/2024    HGB 13.8 04/23/2024    HCT 41.6 04/23/2024    MCV 85.4 04/23/2024    MCH 28.3 04/23/2024    MCHC 33.2 04/23/2024    RDW 13.2 04/23/2024    .0 04/23/2024     Lab Results   Component Value Date    GLU 93 04/23/2024    BUN 18 04/23/2024    BUNCREA 18.4 07/24/2020    CREATSERUM 0.90 04/23/2024    ANIONGAP 6 04/23/2024    GFR 90 03/04/2017    GFRNAA 66 07/24/2020    GFRAA 76 07/24/2020    CA 9.8 04/23/2024     OSMOCALC 290 04/23/2024    ALKPHO 59 04/23/2024    AST 22 04/23/2024    ALT 28 04/23/2024    BILT 0.7 04/23/2024    TP 7.6 04/23/2024    ALB 3.6 04/23/2024    GLOBULIN 4.0 04/23/2024     04/23/2024    K 3.3 (L) 04/23/2024     04/23/2024    CO2 33.0 (H) 04/23/2024     Lab Results   Component Value Date     04/23/2024    A1C 5.6 04/23/2024     Lab Results   Component Value Date    CHOLEST 200 (H) 04/23/2024    TRIG 64 04/23/2024    HDL 55 04/23/2024     (H) 04/23/2024    VLDL 12 04/23/2024    TCHDLRATIO 2.67 02/08/2018    NONHDLC 145 (H) 04/23/2024     Lab Results   Component Value Date    T4F 1.2 04/23/2024    TSH 2.010 04/23/2024     Lab Results   Component Value Date    B12 365 04/23/2024     Lab Results   Component Value Date    VITD 53.4 04/23/2024       Current Outpatient Medications on File Prior to Visit   Medication Sig Dispense Refill    naproxen 500 MG Oral Tab Take 1 tablet (500 mg total) by mouth.      ondansetron 4 MG Oral Tablet Dispersible Take 1 tablet (4 mg total) by mouth every 8 (eight) hours as needed for Nausea. 20 tablet 0    chlorthalidone 25 MG Oral Tab Take 1 tablet (25 mg total) by mouth daily.      Levothyroxine Sodium (SYNTHROID) 88 MCG Oral Tab Take 1 tablet (88 mcg total) by mouth before breakfast. 90 tablet 0     Current Facility-Administered Medications on File Prior to Visit   Medication Dose Route Frequency Provider Last Rate Last Admin    cyanocobalamin (Vitamin B12) 1000 MCG/ML injection 1,000 mcg  1,000 mcg Intramuscular Q30 Days    1,000 mcg at 06/18/24 0949    ipratropium-albuterol (DUONEB) nebulizer solution 3 mL  3 mL Nebulization Once Stacie Martinez APRN           ASSESSMENT  Analyzed weight data:       Diagnoses and all orders for this visit:    Encounter for therapeutic drug monitoring    Obesity (BMI 30-39.9)    Other orders  -     Tirzepatide-Weight Management (ZEPBOUND) 5 MG/0.5ML Subcutaneous Solution Auto-injector; Inject 5 mg into the skin  once a week.                PLAN  Wt Readings from Last 6 Encounters:   08/28/24 207 lb (93.9 kg)   07/25/24 212 lb (96.2 kg)   06/18/24 212 lb (96.2 kg)   05/19/24 214 lb (97.1 kg)   04/23/24 214 lb 12 oz (97.4 kg)   04/02/24 220 lb (99.8 kg)     Weight Max: 243 lb  Down 5   Down 36 lb total  Continue  zepbound 5 mg q weekly   -advised of side effects and adverse effects of this medication  Side effects much better    Reviewed new onset    B12 injection given today     May 2018-June 2018 for QYSMIA : cannot take increased bloodpressure   Cannot take contrave due to increase blood pressure in 2020   PENG and VALENTIN are on back order per FDA with no end in sight.  -advised of side effects and adverse effects of this medication  CANNOT TAKE:: has done  belviq, saxenda, qsymia, metformin, VSL #3, topamax, diethylpropion,       Nutrition: low carb diet/ recommended to eat breakfast daily/ regular protein intake  Follow up with dietitian and psychologist as recommended.  Discussed the role of sleep and stress in weight management.  Counseled on comprehensive weight loss plan including attention to nutrition, exercise and behavior/stress management for success. See patient instruction below for more details.  Discussed strategies to overcome barriers to successful weight loss and weight maintenance  FITTE: ACSM recommendations (150-300 minutes/ week in active weight loss)       There are no Patient Instructions on file for this visit.    No follow-ups on file.    Patient verbalizes understanding.

## 2024-09-05 ENCOUNTER — NURSE ONLY (OUTPATIENT)
Dept: INTERNAL MEDICINE CLINIC | Facility: CLINIC | Age: 59
End: 2024-09-05
Payer: COMMERCIAL

## 2024-09-05 ENCOUNTER — OFFICE VISIT (OUTPATIENT)
Dept: INTERNAL MEDICINE CLINIC | Facility: CLINIC | Age: 59
End: 2024-09-05
Payer: COMMERCIAL

## 2024-09-05 VITALS — HEIGHT: 65.5 IN | WEIGHT: 206.63 LBS | BODY MASS INDEX: 34.01 KG/M2

## 2024-09-05 DIAGNOSIS — E66.9 OBESITY (BMI 30-39.9): Primary | ICD-10-CM

## 2024-09-05 DIAGNOSIS — E53.8 B12 DEFICIENCY: Primary | ICD-10-CM

## 2024-09-05 DIAGNOSIS — E78.2 MIXED HYPERLIPIDEMIA: ICD-10-CM

## 2024-09-05 DIAGNOSIS — E53.8 B12 DEFICIENCY: ICD-10-CM

## 2024-09-05 DIAGNOSIS — I10 ESSENTIAL HYPERTENSION: ICD-10-CM

## 2024-09-05 PROCEDURE — 97803 MED NUTRITION INDIV SUBSEQ: CPT

## 2024-09-05 NOTE — PROGRESS NOTES
FOLLOW UP NUTRITION CONSULTATION        Nutrition Assessment    Number of consultations with dietitian: 3    Height:  Ht Readings from Last 1 Encounters:   08/28/24 5' 5.5\" (1.664 m)       Weight:   Wt Readings from Last 2 Encounters:   08/28/24 207 lb (93.9 kg)   07/25/24 212 lb (96.2 kg)       BMI:  BMI Readings from Last 1 Encounters:   08/28/24 33.92 kg/m²       Weight change: Decrease of 13.4 lbs in the past 5 months        Current Diet/Changes in Eating Habits: Per pt has increased working out had left knee surgery May 6th.   Is eating 4 times per day. Tracks on paper her grams of protein and carbs - aiming for 20-30 g per meal     Food/Beverage Intake:   Breakfast: protein shake Bio metric powder and cheese stick before gym; after gym - instapot chicken breast with cheese and salad or has meat and salad two good yogurt 80 calories 12 g protein, 1 string cheese 7 g protein (19 g)  Lunch: huge plate of salad with some meat (3 oz) 3 oz meatball (18 g protein) cherry tomato string cheese 7 g (25 g total protein) pears   2 hours later has more meat   Dinner: salad and meat sometimes with 1/2 cup to 1 cup of pasta or rice chicken breast (30 g protein), broccoli, salad (light ranch)   Snacks: popcorn (100 shelbie smart pop)   Beverages: water (2 large smart camacho) 67 oz/day      Diet/Lifestyle Modifications Following Previous Nutrition Consultation      Food journal: on paper    Physical activity: walks recumbant bike 15 minutes, assult bike 15 minutes (cardio 60 min per day): weight classes every day for arms 30-40 minutes per day  6 k steps per day   Added in strength with  once per week for 1 hour and physical therapist - legs     Spent 60 minutes in consultation with the patient.      Nutrition Assessment and Intervention/Education:    Nutrition follow up for weight loss was provided. Met with pt for follow up evaluation.  Pt is being followed by Dr. Woods for medical weight management.  Per pt is  taking zepbound and feels control food noise.  Per pt has increased working out had left knee surgery May 6th.   Is eating 4 times per day. Per diet recall, current diet may be low in protein.  Discussed benefits of increasing protein and vegetables in current diet while keeping overall carbs low.  Provided ideas and recipes.  Pt verbalized understanding.  Per pt is drinking ~67 oz of water per day.  Per pt is exercising regularly since retiring earlier this year.  Per pt is doing about 60 minutes of cardio per day and strength training 30-40 minutes per day.  Patient agreed to goals below.    Goals:    1.  Keep a food record, My Net Diary, select the macros dashboard.  2.  Continue to consume at least 4-6 meals/snacks per day.  Include protein and produce when you eat.  Aim  for 75-94 grams of protein per day.  Try to keep the carbohydrates at 100-120 grams per day or less.    3.  Practice some mindful eating strategies, chew food 20-30 times before swallowing, eat food slowly.  Make the meals last 30 minutes.  4. Continue to drink at least 64 oz per day of water.  (Try Protein 2 O water, adding True Lemon, Crystal Light, use a measured container).  5.  Continue to exercise with a goal of 30 minutes per day for exercise (for example,walking).   7.  Continue to strength training 10 minutes 3 days per week.  (7 minute workout song on You Tube) or (Gym Rat no more-18 at home exercises)     Monitoring/Evaluation: {Follow up appt scheduled with dietitian        Meg Powers, EDEN, LDN

## 2024-09-05 NOTE — PATIENT INSTRUCTIONS
Goals:    1.  Keep a food record, My Net Diary, select the macros dashboard.  2.  Continue to consume at least 4-6 meals/snacks per day.  Include protein and produce when you eat.  Aim  for 75-94 grams of protein per day.  Try to keep the carbohydrates at 100-120 grams per day or less.    3.  Practice some mindful eating strategies, chew food 20-30 times before swallowing, eat food slowly.  Make the meals last 30 minutes.  4. Continue to drink at least 64 oz per day of water.  (Try Protein 2 O water, adding True Lemon, Crystal Light, use a measured container).  5.  Continue to exercise with a goal of 30 minutes per day for exercise (for example,walking).   7.  Continue to strength training 10 minutes 3 days per week.  (7 minute workout song on You Tube) or (Gym Rat no more-18 at home exercises)

## 2024-09-25 ENCOUNTER — TELEPHONE (OUTPATIENT)
Dept: INTERNAL MEDICINE CLINIC | Facility: CLINIC | Age: 59
End: 2024-09-25

## 2024-09-25 ENCOUNTER — OFFICE VISIT (OUTPATIENT)
Dept: INTERNAL MEDICINE CLINIC | Facility: CLINIC | Age: 59
End: 2024-09-25
Payer: COMMERCIAL

## 2024-09-25 VITALS
RESPIRATION RATE: 18 BRPM | DIASTOLIC BLOOD PRESSURE: 78 MMHG | HEART RATE: 76 BPM | SYSTOLIC BLOOD PRESSURE: 118 MMHG | WEIGHT: 201 LBS | BODY MASS INDEX: 33.49 KG/M2 | HEIGHT: 65 IN

## 2024-09-25 DIAGNOSIS — Z51.81 ENCOUNTER FOR THERAPEUTIC DRUG MONITORING: ICD-10-CM

## 2024-09-25 DIAGNOSIS — E66.9 OBESITY (BMI 30-39.9): Primary | ICD-10-CM

## 2024-09-25 PROCEDURE — 96372 THER/PROPH/DIAG INJ SC/IM: CPT | Performed by: INTERNAL MEDICINE

## 2024-09-25 PROCEDURE — 99213 OFFICE O/P EST LOW 20 MIN: CPT | Performed by: INTERNAL MEDICINE

## 2024-09-25 RX ORDER — TIRZEPATIDE 5 MG/.5ML
5 INJECTION, SOLUTION SUBCUTANEOUS WEEKLY
Qty: 2 ML | Refills: 2 | Status: SHIPPED | OUTPATIENT
Start: 2024-09-25

## 2024-09-25 RX ADMIN — CYANOCOBALAMIN 1000 MCG: 1000 INJECTION, SOLUTION INTRAMUSCULAR; SUBCUTANEOUS at 12:46:00

## 2024-09-25 NOTE — PROGRESS NOTES
HISTORY OF PRESENT ILLNESS  Chief Complaint   Patient presents with    Weight Check     Down 6 pounds      Doreen Dexter is a 58 year old female   Down another 6 lb this month   Reviewed her fitness plan   Bike to assault bike  Weight training and walking to and from the gym   Clothes are fitting better   Working with    Reviewed 24 dietary recall   Cheese/ chicken / dinner is protein with some carbohydrates        Wt Readings from Last 6 Encounters:   09/25/24 201 lb (91.2 kg)   09/05/24 206 lb 9.6 oz (93.7 kg)   08/28/24 207 lb (93.9 kg)   07/25/24 212 lb (96.2 kg)   06/18/24 212 lb (96.2 kg)   05/19/24 214 lb (97.1 kg)            REVIEW OF SYSTEMS  GENERAL HEALTH: feels well otherwise, denied any fevers chills or night sweats   RESPIRATORY: denies shortness of breath   CARDIOVASCULAR: denies chest pain  GI: denies abdominal pain  PSYCH: denies any mood changes      Objective  EXAM  Reviewed most recent set of vitals   Physical Exam:  GENERAL: well developed, well nourished, in no apparent distress, speaking in full sentences comfortably   SKIN: warm, pink, dry without rashes to exposed area   EYES: conjunctiva pink  HEENT: atraumatic, normocephalic  LUNGS: normal work of breathing, non labored  CARDIO: normal work, no exertion  EXTREMITIES: no cyanosis, no clubbing, no edema  NEURO: Oriented times three  PSYCH: pleasant, cooperative, normal mood and affect    Lab Results   Component Value Date    WBC 6.1 04/23/2024    RBC 4.87 04/23/2024    HGB 13.8 04/23/2024    HCT 41.6 04/23/2024    MCV 85.4 04/23/2024    MCH 28.3 04/23/2024    MCHC 33.2 04/23/2024    RDW 13.2 04/23/2024    .0 04/23/2024     Lab Results   Component Value Date    GLU 93 04/23/2024    BUN 18 04/23/2024    BUNCREA 18.4 07/24/2020    CREATSERUM 0.90 04/23/2024    ANIONGAP 6 04/23/2024    GFR 90 03/04/2017    GFRNAA 66 07/24/2020    GFRAA 76 07/24/2020    CA 9.8 04/23/2024    OSMOCALC 290 04/23/2024    ALKPHO 59 04/23/2024     AST 22 04/23/2024    ALT 28 04/23/2024    BILT 0.7 04/23/2024    TP 7.6 04/23/2024    ALB 3.6 04/23/2024    GLOBULIN 4.0 04/23/2024     04/23/2024    K 3.3 (L) 04/23/2024     04/23/2024    CO2 33.0 (H) 04/23/2024     Lab Results   Component Value Date     04/23/2024    A1C 5.6 04/23/2024     Lab Results   Component Value Date    CHOLEST 200 (H) 04/23/2024    TRIG 64 04/23/2024    HDL 55 04/23/2024     (H) 04/23/2024    VLDL 12 04/23/2024    TCHDLRATIO 2.67 02/08/2018    NONHDLC 145 (H) 04/23/2024     Lab Results   Component Value Date    T4F 1.2 04/23/2024    TSH 2.010 04/23/2024     Lab Results   Component Value Date    B12 365 04/23/2024     Lab Results   Component Value Date    VITD 53.4 04/23/2024       Current Outpatient Medications on File Prior to Visit   Medication Sig Dispense Refill    Tirzepatide-Weight Management (ZEPBOUND) 5 MG/0.5ML Subcutaneous Solution Auto-injector Inject 5 mg into the skin once a week. 2 mL 1    naproxen 500 MG Oral Tab Take 1 tablet (500 mg total) by mouth.      ondansetron 4 MG Oral Tablet Dispersible Take 1 tablet (4 mg total) by mouth every 8 (eight) hours as needed for Nausea. 20 tablet 0    chlorthalidone 25 MG Oral Tab Take 1 tablet (25 mg total) by mouth daily.      Levothyroxine Sodium (SYNTHROID) 88 MCG Oral Tab Take 1 tablet (88 mcg total) by mouth before breakfast. 90 tablet 0     Current Facility-Administered Medications on File Prior to Visit   Medication Dose Route Frequency Provider Last Rate Last Admin    cyanocobalamin (Vitamin B12) 1000 MCG/ML injection 1,000 mcg  1,000 mcg Intramuscular Q30 Days    1,000 mcg at 09/25/24 1246    ipratropium-albuterol (DUONEB) nebulizer solution 3 mL  3 mL Nebulization Once Stacie Martinez APRN           ASSESSMENT  Analyzed weight data:       Diagnoses and all orders for this visit:    Obesity (BMI 30-39.9) [E66.9]    Encounter for therapeutic drug monitoring    Other orders  -     Tirzepatide-Weight  Management (ZEPBOUND) 5 MG/0.5ML Subcutaneous Solution Auto-injector; Inject 5 mg into the skin once a week.                  PLAN  Wt Readings from Last 6 Encounters:   09/25/24 201 lb (91.2 kg)   09/05/24 206 lb 9.6 oz (93.7 kg)   08/28/24 207 lb (93.9 kg)   07/25/24 212 lb (96.2 kg)   06/18/24 212 lb (96.2 kg)   05/19/24 214 lb (97.1 kg)     Weight Max: 243 lb  Down 6  Down 41 lb total  Continue  zepbound 5 mg q weekly   -advised of side effects and adverse effects of this medication  Side effects much better  B12 injection given today   May 2018-June 2018 for QYSMIA : cannot take increased bloodpressure   Cannot take contrave due to increase blood pressure in 2020   PENG and VALENTIN are on back order per FDA with no end in sight.  -advised of side effects and adverse effects of this medication  CANNOT TAKE:: has done  belviq, saxenda, qsymia, metformin, VSL #3, topamax, diethylpropion,   Nutrition: low carb diet/ recommended to eat breakfast daily/ regular protein intake  Follow up with dietitian and psychologist as recommended.  Discussed the role of sleep and stress in weight management.  Counseled on comprehensive weight loss plan including attention to nutrition, exercise and behavior/stress management for success. See patient instruction below for more details.  Discussed strategies to overcome barriers to successful weight loss and weight maintenance  FITTE: ACSM recommendations (150-300 minutes/ week in active weight loss)       There are no Patient Instructions on file for this visit.    No follow-ups on file.    Patient verbalizes understanding.

## 2024-10-24 ENCOUNTER — OFFICE VISIT (OUTPATIENT)
Dept: INTERNAL MEDICINE CLINIC | Facility: CLINIC | Age: 59
End: 2024-10-24
Payer: COMMERCIAL

## 2024-10-24 VITALS
SYSTOLIC BLOOD PRESSURE: 112 MMHG | WEIGHT: 203 LBS | HEIGHT: 65.5 IN | DIASTOLIC BLOOD PRESSURE: 78 MMHG | BODY MASS INDEX: 33.42 KG/M2 | RESPIRATION RATE: 16 BRPM | HEART RATE: 78 BPM

## 2024-10-24 DIAGNOSIS — E53.8 B12 DEFICIENCY: Primary | ICD-10-CM

## 2024-10-24 DIAGNOSIS — Z51.81 ENCOUNTER FOR THERAPEUTIC DRUG MONITORING: ICD-10-CM

## 2024-10-24 DIAGNOSIS — E66.9 OBESITY (BMI 30-39.9): ICD-10-CM

## 2024-10-24 PROCEDURE — 99213 OFFICE O/P EST LOW 20 MIN: CPT | Performed by: INTERNAL MEDICINE

## 2024-10-24 RX ORDER — TIRZEPATIDE 7.5 MG/.5ML
7.5 INJECTION, SOLUTION SUBCUTANEOUS WEEKLY
Qty: 2 ML | Refills: 2 | Status: SHIPPED | OUTPATIENT
Start: 2024-10-24 | End: 2024-11-15

## 2024-10-24 RX ORDER — CYANOCOBALAMIN 1000 UG/ML
1000 INJECTION, SOLUTION INTRAMUSCULAR; SUBCUTANEOUS ONCE
Status: SHIPPED | OUTPATIENT
Start: 2024-10-24

## 2024-10-24 NOTE — PROGRESS NOTES
HISTORY OF PRESENT ILLNESS  Chief Complaint   Patient presents with    Weight Check     UP 2 LB     Doreen Dexter is a 59 year old female   Up 2 lb   Had a grand baby   Sleeping 8-11 then wake wake up 5-9 PM   Helps daughter from 10-2 PM   Eating less andnot losing weight and not able to lose           Wt Readings from Last 6 Encounters:   10/24/24 203 lb (92.1 kg)   09/25/24 201 lb (91.2 kg)   09/05/24 206 lb 9.6 oz (93.7 kg)   08/28/24 207 lb (93.9 kg)   07/25/24 212 lb (96.2 kg)   06/18/24 212 lb (96.2 kg)            REVIEW OF SYSTEMS  GENERAL HEALTH: feels well otherwise, denied any fevers chills or night sweats   RESPIRATORY: denies shortness of breath   CARDIOVASCULAR: denies chest pain  GI: denies abdominal pain  PSYCH: denies any mood changes      Objective  EXAM  Reviewed most recent set of vitals   Physical Exam:  GENERAL: well developed, well nourished, in no apparent distress, speaking in full sentences comfortably   SKIN: warm, pink, dry without rashes to exposed area   EYES: conjunctiva pink  HEENT: atraumatic, normocephalic  LUNGS: normal work of breathing, non labored  CARDIO: normal work, no exertion  EXTREMITIES: no cyanosis, no clubbing, no edema  NEURO: Oriented times three  PSYCH: pleasant, cooperative, normal mood and affect    Lab Results   Component Value Date    WBC 6.1 04/23/2024    RBC 4.87 04/23/2024    HGB 13.8 04/23/2024    HCT 41.6 04/23/2024    MCV 85.4 04/23/2024    MCH 28.3 04/23/2024    MCHC 33.2 04/23/2024    RDW 13.2 04/23/2024    .0 04/23/2024     Lab Results   Component Value Date    GLU 93 04/23/2024    BUN 18 04/23/2024    BUNCREA 18.4 07/24/2020    CREATSERUM 0.90 04/23/2024    ANIONGAP 6 04/23/2024    GFR 90 03/04/2017    GFRNAA 66 07/24/2020    GFRAA 76 07/24/2020    CA 9.8 04/23/2024    OSMOCALC 290 04/23/2024    ALKPHO 59 04/23/2024    AST 22 04/23/2024    ALT 28 04/23/2024    BILT 0.7 04/23/2024    TP 7.6 04/23/2024    ALB 3.6 04/23/2024    GLOBULIN 4.0  04/23/2024     04/23/2024    K 3.3 (L) 04/23/2024     04/23/2024    CO2 33.0 (H) 04/23/2024     Lab Results   Component Value Date     04/23/2024    A1C 5.6 04/23/2024     Lab Results   Component Value Date    CHOLEST 200 (H) 04/23/2024    TRIG 64 04/23/2024    HDL 55 04/23/2024     (H) 04/23/2024    VLDL 12 04/23/2024    TCHDLRATIO 2.67 02/08/2018    NONHDLC 145 (H) 04/23/2024     Lab Results   Component Value Date    T4F 1.2 04/23/2024    TSH 2.010 04/23/2024     Lab Results   Component Value Date    B12 365 04/23/2024     Lab Results   Component Value Date    VITD 53.4 04/23/2024       Current Outpatient Medications on File Prior to Visit   Medication Sig Dispense Refill    naproxen 500 MG Oral Tab Take 1 tablet (500 mg total) by mouth.      ondansetron 4 MG Oral Tablet Dispersible Take 1 tablet (4 mg total) by mouth every 8 (eight) hours as needed for Nausea. 20 tablet 0    chlorthalidone 25 MG Oral Tab Take 1 tablet (25 mg total) by mouth daily.      Levothyroxine Sodium (SYNTHROID) 88 MCG Oral Tab Take 1 tablet (88 mcg total) by mouth before breakfast. 90 tablet 0     Current Facility-Administered Medications on File Prior to Visit   Medication Dose Route Frequency Provider Last Rate Last Admin    cyanocobalamin (Vitamin B12) 1000 MCG/ML injection 1,000 mcg  1,000 mcg Intramuscular Q30 Days    1,000 mcg at 09/25/24 1246    ipratropium-albuterol (DUONEB) nebulizer solution 3 mL  3 mL Nebulization Once Stacie Martinez APRN           ASSESSMENT  Analyzed weight data:  Weight Calculations  Initial Weight: 231 lbs  Initial Weight Date: 06/11/19  Today's Weight: 203 lbs  5% Goal: 11.55  10% Goal: 23.1  Total Weight Loss: 28 lbs    Diagnoses and all orders for this visit:    B12 deficiency  -     cyanocobalamin (Vitamin B12) 1000 MCG/ML injection 1,000 mcg    Encounter for therapeutic drug monitoring    Obesity (BMI 30-39.9) [E66.9]    Other orders  -     Tirzepatide-Weight Management  (ZEPBOUND) 7.5 MG/0.5ML Subcutaneous Solution Auto-injector; Inject 7.5 mg into the skin once a week for 4 doses.                  PLAN  Wt Readings from Last 6 Encounters:   10/24/24 203 lb (92.1 kg)   09/25/24 201 lb (91.2 kg)   09/05/24 206 lb 9.6 oz (93.7 kg)   08/28/24 207 lb (93.9 kg)   07/25/24 212 lb (96.2 kg)   06/18/24 212 lb (96.2 kg)     Weight Max: 243 lb  Up 2   Down 39 lb total  Increase  zepbound 7.5  mg q weekly   -advised of side effects and adverse effects of this medication  Side effects much better  B12 injection given today   May 2018-June 2018 for QYSMIA : cannot take increased bloodpressure   Cannot take contrave due to increase blood pressure in 2020   PENG and VALENTIN are on back order per FDA with no end in sight.  -advised of side effects and adverse effects of this medication  CANNOT TAKE:: has done  belviq, saxenda, qsymia, metformin, VSL #3, topamax, diethylpropion,   Nutrition: low carb diet/ recommended to eat breakfast daily/ regular protein intake  Follow up with dietitian and psychologist as recommended.  Discussed the role of sleep and stress in weight management.  Counseled on comprehensive weight loss plan including attention to nutrition, exercise and behavior/stress management for success. See patient instruction below for more details.  Discussed strategies to overcome barriers to successful weight loss and weight maintenance  FITTE: ACSM recommendations (150-300 minutes/ week in active weight loss)       There are no Patient Instructions on file for this visit.    No follow-ups on file.    Patient verbalizes understanding.

## 2024-10-28 NOTE — TELEPHONE ENCOUNTER
LVM for patient that she missed her medication follow up today with Dr Abdulaziz Montemayor and that she will be charged $40.00 for a no show fee.
Statement Selected

## 2024-11-19 ENCOUNTER — PATIENT MESSAGE (OUTPATIENT)
Dept: INTERNAL MEDICINE CLINIC | Facility: CLINIC | Age: 59
End: 2024-11-19

## 2024-11-19 DIAGNOSIS — E66.9 OBESITY (BMI 30-39.9): Primary | ICD-10-CM

## 2024-11-21 RX ORDER — TIRZEPATIDE 5 MG/.5ML
5 INJECTION, SOLUTION SUBCUTANEOUS WEEKLY
Qty: 2 ML | Refills: 0 | Status: SHIPPED | OUTPATIENT
Start: 2024-11-21 | End: 2024-12-13

## 2024-11-21 NOTE — TELEPHONE ENCOUNTER
Patient LVM that she does not want to go up to 7.5 mg zepbound right now.  She is doing well on 5 mg weekly and has a trip upcoming and afraid of side effects.  She would like to remain on 5 mg weekly and has f/u with Dr. Woods and will discuss increasing next month instead.    Future Appointments   Date Time Provider Department Center   11/26/2024 12:00 PM Pily Woods MD [facility]     Zepbound 7.5 mg order sent 10/24/24 for 3 months

## 2024-12-23 ENCOUNTER — TELEMEDICINE (OUTPATIENT)
Dept: INTERNAL MEDICINE CLINIC | Facility: CLINIC | Age: 59
End: 2024-12-23
Payer: COMMERCIAL

## 2024-12-23 DIAGNOSIS — E53.8 B12 DEFICIENCY: ICD-10-CM

## 2024-12-23 DIAGNOSIS — E66.9 OBESITY (BMI 30-39.9): ICD-10-CM

## 2024-12-23 DIAGNOSIS — Z51.81 ENCOUNTER FOR THERAPEUTIC DRUG MONITORING: Primary | ICD-10-CM

## 2024-12-23 RX ORDER — TIRZEPATIDE 7.5 MG/.5ML
7.5 INJECTION, SOLUTION SUBCUTANEOUS WEEKLY
Qty: 2 ML | Refills: 0 | Status: SHIPPED | OUTPATIENT
Start: 2024-12-23 | End: 2025-01-14

## 2024-12-23 NOTE — PROGRESS NOTES
HISTORY OF PRESENT ILLNESS  Chief Complaint   Patient presents with    Weight Check     Video       Doreen Dexter is a 59 year old female         Had to decrease dose to 2.5 mg weekly   But was sick and then had to travel   Did not want to be sick with travel  Currently starting back up at 5 mg   Currenlty working out about 3 days per week   Daughter goes back in Jan   Plans to get back to 5-6 timesper week   Morning weight 204         [facility] Follow Up    General Information  Success Moment: Weight loss  Challenging Moment: Surgery  Nutrition Recall  Breakfast: Na Lunch: Na   Dinner: Na Snacks: Na   Fluids: Na Dining Out: 1   Exercise   Patient stated exercises # days/week: 3  Patient stated perceived level of   exertion: 3       Patient stated average level of stress: 1  Sleep   Patient stated # hours uninterrupted sleep: 8   Patient stated feels   restful: Yes      Cause of disruption of sleep: No   Goals: Na                  Wt Readings from Last 6 Encounters:   10/24/24 203 lb (92.1 kg)   09/25/24 201 lb (91.2 kg)   09/05/24 206 lb 9.6 oz (93.7 kg)   08/28/24 207 lb (93.9 kg)   07/25/24 212 lb (96.2 kg)   06/18/24 212 lb (96.2 kg)            REVIEW OF SYSTEMS  GENERAL HEALTH: feels well otherwise, denied any fevers chills or night sweats   RESPIRATORY: denies shortness of breath   CARDIOVASCULAR: denies chest pain  GI: denies abdominal pain  PSYCH: denies any mood changes      Objective  EXAM  Reviewed most recent set of vitals   Physical Exam:  GENERAL: well developed, well nourished, in no apparent distress, speaking in full sentences comfortably   SKIN: warm, pink, dry without rashes to exposed area   EYES: conjunctiva pink  HEENT: atraumatic, normocephalic  LUNGS: normal work of breathing, non labored  CARDIO: normal work, no exertion  EXTREMITIES: no cyanosis, no clubbing, no edema  NEURO: Oriented times three  PSYCH: pleasant, cooperative, normal mood and affect    Lab Results   Component Value Date     WBC 6.1 04/23/2024    RBC 4.87 04/23/2024    HGB 13.8 04/23/2024    HCT 41.6 04/23/2024    MCV 85.4 04/23/2024    MCH 28.3 04/23/2024    MCHC 33.2 04/23/2024    RDW 13.2 04/23/2024    .0 04/23/2024     Lab Results   Component Value Date    GLU 93 04/23/2024    BUN 18 04/23/2024    BUNCREA 18.4 07/24/2020    CREATSERUM 0.90 04/23/2024    ANIONGAP 6 04/23/2024    GFR 90 03/04/2017    GFRNAA 66 07/24/2020    GFRAA 76 07/24/2020    CA 9.8 04/23/2024    OSMOCALC 290 04/23/2024    ALKPHO 59 04/23/2024    AST 22 04/23/2024    ALT 28 04/23/2024    BILT 0.7 04/23/2024    TP 7.6 04/23/2024    ALB 3.6 04/23/2024    GLOBULIN 4.0 04/23/2024     04/23/2024    K 3.3 (L) 04/23/2024     04/23/2024    CO2 33.0 (H) 04/23/2024     Lab Results   Component Value Date     04/23/2024    A1C 5.6 04/23/2024     Lab Results   Component Value Date    CHOLEST 200 (H) 04/23/2024    TRIG 64 04/23/2024    HDL 55 04/23/2024     (H) 04/23/2024    VLDL 12 04/23/2024    TCHDLRATIO 2.67 02/08/2018    NONHDLC 145 (H) 04/23/2024     Lab Results   Component Value Date    T4F 1.2 04/23/2024    TSH 2.010 04/23/2024     Lab Results   Component Value Date    B12 365 04/23/2024     Lab Results   Component Value Date    VITD 53.4 04/23/2024       Current Outpatient Medications on File Prior to Visit   Medication Sig Dispense Refill    chlorthalidone 25 MG Oral Tab Take 1 tablet (25 mg total) by mouth daily.      Levothyroxine Sodium (SYNTHROID) 88 MCG Oral Tab Take 1 tablet (88 mcg total) by mouth before breakfast. 90 tablet 0     Current Facility-Administered Medications on File Prior to Visit   Medication Dose Route Frequency Provider Last Rate Last Admin    cyanocobalamin (Vitamin B12) 1000 MCG/ML injection 1,000 mcg  1,000 mcg Intramuscular Once         ipratropium-albuterol (DUONEB) nebulizer solution 3 mL  3 mL Nebulization Once Stacie Martinez APRN           ASSESSMENT  Analyzed weight data:       Diagnoses and all orders  for this visit:    Encounter for therapeutic drug monitoring    Obesity (BMI 30-39.9)    B12 deficiency    Other orders  -     Tirzepatide-Weight Management (ZEPBOUND) 7.5 MG/0.5ML Subcutaneous Solution Auto-injector; Inject 7.5 mg into the skin once a week for 4 doses.                  PLAN  Wt Readings from Last 6 Encounters:   10/24/24 203 lb (92.1 kg)   09/25/24 201 lb (91.2 kg)   09/05/24 206 lb 9.6 oz (93.7 kg)   08/28/24 207 lb (93.9 kg)   07/25/24 212 lb (96.2 kg)   06/18/24 212 lb (96.2 kg)     Weight Max: 243 lb  Weight plateau   Down 39 lb total  Increase  zepbound 7.5  mg q weekly   -advised of side effects and adverse effects of this medication  Side effects much better  B12 injection given today   May 2018-June 2018 for QYSMIA : cannot take increased bloodpressure   Cannot take contrave due to increase blood pressure in 2020   [facility] are on back order per FDA with no end in sight.  -advised of side effects and adverse effects of this medication  CANNOT TAKE:: has done  belviq, saxenda, qsymia, metformin, VSL #3, topamax, diethylpropion,   Nutrition: low carb diet/ recommended to eat breakfast daily/ regular protein intake  Follow up with dietitian and psychologist as recommended.  Discussed the role of sleep and stress in weight management.  Counseled on comprehensive weight loss plan including attention to nutrition, exercise and behavior/stress management for success. See patient instruction below for more details.  Discussed strategies to overcome barriers to successful weight loss and weight maintenance  FITTE: ACSM recommendations (150-300 minutes/ week in active weight loss)       There are no Patient Instructions on file for this visit.    No follow-ups on file.    Patient verbalizes understanding.

## 2025-01-03 ENCOUNTER — TELEPHONE (OUTPATIENT)
Dept: FAMILY MEDICINE CLINIC | Facility: CLINIC | Age: 60
End: 2025-01-03

## 2025-01-03 NOTE — TELEPHONE ENCOUNTER
Received pt call stating that she had her mammogram done at [facility] on 12/31/24 and it was abnormal. Pt usually sees her OB/gyn Dr. Bee and results are being sent to her. Pt needs additional imaging and Dr. Bee is currently on vacation.     12/31/24 BI MAMMOGRAM SCREENING W TOMOSYNTHESIS BILATERAL results can be viewed in CareEverywhere under Other Results. Pt asking Dr. Cook to review mammogram results and order additional imaging. She is concerned due to her family history.

## 2025-01-07 ENCOUNTER — PATIENT MESSAGE (OUTPATIENT)
Dept: INTERNAL MEDICINE CLINIC | Facility: CLINIC | Age: 60
End: 2025-01-07

## 2025-01-07 NOTE — TELEPHONE ENCOUNTER
PT requesting to stay at 5 mg of Zepbound instead of moving up to 7.5.     Patient was moved up to 7.5 at last Appotiment   12/23/2024   Took shot of Zepbound 5 mg and was sick for 3 days .

## 2025-01-08 RX ORDER — TIRZEPATIDE 5 MG/.5ML
5 INJECTION, SOLUTION SUBCUTANEOUS WEEKLY
Qty: 2 ML | Refills: 0 | Status: SHIPPED | OUTPATIENT
Start: 2025-01-08 | End: 2025-01-30

## 2025-01-20 NOTE — TELEPHONE ENCOUNTER
Patient already had breast ultrasound on 1/6/25. See order and results in Care Everywhere under [facility].

## 2025-01-20 NOTE — TELEPHONE ENCOUNTER
Ok to order US of left breast  However, I am not sure if we can order the breast US since we are not affiliated with [facility].

## 2025-01-23 ENCOUNTER — OFFICE VISIT (OUTPATIENT)
Dept: INTERNAL MEDICINE CLINIC | Facility: CLINIC | Age: 60
End: 2025-01-23
Payer: COMMERCIAL

## 2025-01-23 VITALS
BODY MASS INDEX: 33.66 KG/M2 | WEIGHT: 202 LBS | RESPIRATION RATE: 20 BRPM | HEIGHT: 65 IN | DIASTOLIC BLOOD PRESSURE: 76 MMHG | HEART RATE: 85 BPM | SYSTOLIC BLOOD PRESSURE: 120 MMHG

## 2025-01-23 DIAGNOSIS — Z51.81 ENCOUNTER FOR THERAPEUTIC DRUG MONITORING: Primary | ICD-10-CM

## 2025-01-23 DIAGNOSIS — E66.9 OBESITY (BMI 30-39.9): ICD-10-CM

## 2025-01-23 PROCEDURE — 99214 OFFICE O/P EST MOD 30 MIN: CPT | Performed by: INTERNAL MEDICINE

## 2025-01-23 RX ORDER — METRONIDAZOLE 500 MG/1
500 TABLET ORAL 2 TIMES DAILY
COMMUNITY
Start: 2025-01-12

## 2025-01-23 RX ORDER — PRAVASTATIN SODIUM 20 MG
TABLET ORAL
COMMUNITY
Start: 2025-01-12

## 2025-01-23 RX ORDER — PANCRELIPASE LIPASE, PANCRELIPASE PROTEASE, PANCRELIPASE AMYLASE 252600; 60000; 189600 [USP'U]/1; [USP'U]/1; [USP'U]/1
1 CAPSULE, DELAYED RELEASE ORAL DAILY
Qty: 36 CAPSULE | Refills: 0 | COMMUNITY
Start: 2025-01-23

## 2025-01-23 RX ORDER — ATORVASTATIN CALCIUM 10 MG/1
10 TABLET, FILM COATED ORAL DAILY
COMMUNITY
Start: 2024-12-28

## 2025-01-23 RX ORDER — FOLIC ACID 1 MG/1
TABLET ORAL
COMMUNITY
Start: 2025-01-11

## 2025-01-23 RX ORDER — TIRZEPATIDE 5 MG/.5ML
5 INJECTION, SOLUTION SUBCUTANEOUS WEEKLY
Qty: 2 ML | Refills: 2 | Status: SHIPPED | OUTPATIENT
Start: 2025-01-23

## 2025-01-23 RX ORDER — NITROFURANTOIN 25; 75 MG/1; MG/1
100 CAPSULE ORAL 2 TIMES DAILY
COMMUNITY
Start: 2025-01-11

## 2025-01-23 RX ORDER — PANCRELIPASE LIPASE, PANCRELIPASE PROTEASE, PANCRELIPASE AMYLASE 252600; 60000; 189600 [USP'U]/1; [USP'U]/1; [USP'U]/1
1 CAPSULE, DELAYED RELEASE ORAL
Qty: 540 CAPSULE | Refills: 1 | Status: SHIPPED | OUTPATIENT
Start: 2025-01-23

## 2025-01-23 NOTE — PROGRESS NOTES
HISTORY OF PRESENT ILLNESS  Chief Complaint   Patient presents with    Weight Check     Down 1      oDreen Dexter is a 59 year old female     Down 1   With her grandson this week, taking over for his car this week   Back to schedule of her exercise   Strength and cardio most days   Felt that after jesu and was sick and did have to go back to 5 mg   Does struggle with consitpation at times.       [facility] Follow Up    General Information  Nutrition Recall  Exercise     Sleep                  Wt Readings from Last 6 Encounters:   01/23/25 202 lb (91.6 kg)   10/24/24 203 lb (92.1 kg)   09/25/24 201 lb (91.2 kg)   09/05/24 206 lb 9.6 oz (93.7 kg)   08/28/24 207 lb (93.9 kg)   07/25/24 212 lb (96.2 kg)            REVIEW OF SYSTEMS  GENERAL HEALTH: feels well otherwise, denied any fevers chills or night sweats   RESPIRATORY: denies shortness of breath   CARDIOVASCULAR: denies chest pain  GI: denies abdominal pain  PSYCH: denies any mood changes      Objective  EXAM  Reviewed most recent set of vitals   Physical Exam:  GENERAL: well developed, well nourished, in no apparent distress, speaking in full sentences comfortably   SKIN: warm, pink, dry without rashes to exposed area   EYES: conjunctiva pink  HEENT: atraumatic, normocephalic  LUNGS: normal work of breathing, non labored  CARDIO: normal work, no exertion  EXTREMITIES: no cyanosis, no clubbing, no edema  NEURO: Oriented times three  PSYCH: pleasant, cooperative, normal mood and affect    Lab Results   Component Value Date    WBC 6.1 04/23/2024    RBC 4.87 04/23/2024    HGB 13.8 04/23/2024    HCT 41.6 04/23/2024    MCV 85.4 04/23/2024    MCH 28.3 04/23/2024    MCHC 33.2 04/23/2024    RDW 13.2 04/23/2024    .0 04/23/2024     Lab Results   Component Value Date    GLU 93 04/23/2024    BUN 18 04/23/2024    BUNCREA 18.4 07/24/2020    CREATSERUM 0.90 04/23/2024    ANIONGAP 6 04/23/2024    GFR 90 03/04/2017    GFRNAA 66 07/24/2020    GFRAA 76 07/24/2020    CA  9.8 04/23/2024    OSMOCALC 290 04/23/2024    ALKPHO 59 04/23/2024    AST 22 04/23/2024    ALT 28 04/23/2024    BILT 0.7 04/23/2024    TP 7.6 04/23/2024    ALB 3.6 04/23/2024    GLOBULIN 4.0 04/23/2024     04/23/2024    K 3.3 (L) 04/23/2024     04/23/2024    CO2 33.0 (H) 04/23/2024     Lab Results   Component Value Date     04/23/2024    A1C 5.6 04/23/2024     Lab Results   Component Value Date    CHOLEST 200 (H) 04/23/2024    TRIG 64 04/23/2024    HDL 55 04/23/2024     (H) 04/23/2024    VLDL 12 04/23/2024    TCHDLRATIO 2.67 02/08/2018    NONHDLC 145 (H) 04/23/2024     Lab Results   Component Value Date    T4F 1.2 04/23/2024    TSH 2.010 04/23/2024     Lab Results   Component Value Date    B12 365 04/23/2024     Lab Results   Component Value Date    VITD 53.4 04/23/2024       Current Outpatient Medications on File Prior to Visit   Medication Sig Dispense Refill    atorvastatin 10 MG Oral Tab Take 1 tablet (10 mg total) by mouth daily.      Cholecalciferol (VITAMIN D3) 1.25 MG (41913 UT) Oral Cap TAKE ONE CAPSULE BY MOUTH EVERY WEEK AS DIRECTED      metroNIDAZOLE 500 MG Oral Tab Take 1 tablet (500 mg total) by mouth 2 (two) times daily.      nitrofurantoin monohydrate macro 100 MG Oral Cap Take 1 capsule (100 mg total) by mouth 2 (two) times daily.      pravastatin 20 MG Oral Tab       Tirzepatide-Weight Management (ZEPBOUND) 5 MG/0.5ML Subcutaneous Solution Auto-injector Inject 5 mg into the skin once a week for 4 doses. 2 mL 0    chlorthalidone 25 MG Oral Tab Take 1 tablet (25 mg total) by mouth daily.      Levothyroxine Sodium (SYNTHROID) 88 MCG Oral Tab Take 1 tablet (88 mcg total) by mouth before breakfast. 90 tablet 0     Current Facility-Administered Medications on File Prior to Visit   Medication Dose Route Frequency Provider Last Rate Last Admin    cyanocobalamin (Vitamin B12) 1000 MCG/ML injection 1,000 mcg  1,000 mcg Intramuscular Once         ipratropium-albuterol (DUONEB)  nebulizer solution 3 mL  3 mL Nebulization Once Stacie Martinez APRN           ASSESSMENT  Analyzed weight data:       Diagnoses and all orders for this visit:    Encounter for therapeutic drug monitoring    Obesity (BMI 30-39.9)    Other orders  -     Pancrelipase, Lip-Prot-Amyl, (ZENPEP) 58807-045559 units Oral Cap DR Particles; Take 1 capsule by mouth 3 (three) times daily before meals.  -     Pancrelipase, Lip-Prot-Amyl, (ZENPEP) [id number] units Oral Cap DR Particles; Take 1 tablet by mouth daily.  -     Tirzepatide-Weight Management (ZEPBOUND) 5 MG/0.5ML Subcutaneous Solution Auto-injector; Inject 5 mg into the skin once a week.                  PLAN  Wt Readings from Last 6 Encounters:   01/23/25 202 lb (91.6 kg)   10/24/24 203 lb (92.1 kg)   09/25/24 201 lb (91.2 kg)   09/05/24 206 lb 9.6 oz (93.7 kg)   08/28/24 207 lb (93.9 kg)   07/25/24 212 lb (96.2 kg)     Weight Max: 243 lb  Weight plateau   Down 39 lb total  Total time spent on chart review, pre-charting, obtaining history, counseling, and educating, reviewing labs was 30 minutes.  Reviewed GI side effects   Trial of zenpep   -advised of side effects and adverse effects of this medication    Increase  zepbound 7.5  mg q weekly   -advised of side effects and adverse effects of this medication  Side effects much better    May 2018-June 2018 for QYSMIA : cannot take increased bloodpressure   Cannot take contrave due to increase blood pressure in 2020   Carola are on back order per FDA with no end in sight.  -advised of side effects and adverse effects of this medication  CANNOT TAKE:: has done  belviq, saxenda, qsymia, metformin, VSL #3, topamax, diethylpropion,   Nutrition: low carb diet/ recommended to eat breakfast daily/ regular protein intake  Follow up with dietitian and psychologist as recommended.  Discussed the role of sleep and stress in weight management.  Counseled on comprehensive weight loss plan including attention to  nutrition, exercise and behavior/stress management for success. See patient instruction below for more details.  Discussed strategies to overcome barriers to successful weight loss and weight maintenance  FITTE: ACSM recommendations (150-300 minutes/ week in active weight loss)       There are no Patient Instructions on file for this visit.    No follow-ups on file.    Patient verbalizes understanding.

## 2025-01-28 ENCOUNTER — OFFICE VISIT (OUTPATIENT)
Dept: FAMILY MEDICINE CLINIC | Facility: CLINIC | Age: 60
End: 2025-01-28
Payer: COMMERCIAL

## 2025-01-28 VITALS
RESPIRATION RATE: 18 BRPM | TEMPERATURE: 98 F | WEIGHT: 204.38 LBS | DIASTOLIC BLOOD PRESSURE: 68 MMHG | HEART RATE: 60 BPM | BODY MASS INDEX: 32.85 KG/M2 | HEIGHT: 66 IN | OXYGEN SATURATION: 98 % | SYSTOLIC BLOOD PRESSURE: 124 MMHG

## 2025-01-28 DIAGNOSIS — E83.52 HYPERCALCEMIA: ICD-10-CM

## 2025-01-28 DIAGNOSIS — D72.820 LYMPHOCYTOSIS: Primary | ICD-10-CM

## 2025-01-28 DIAGNOSIS — Z86.39 HISTORY OF THYROID DISEASE: ICD-10-CM

## 2025-01-28 PROCEDURE — 99204 OFFICE O/P NEW MOD 45 MIN: CPT | Performed by: FAMILY MEDICINE

## 2025-01-28 NOTE — PROGRESS NOTES
Chief Complaint   Patient presents with    Well Adult    Physical     Reviewed Preventative/Wellness form with patient.      Pt is fasting     HPI  Patient is here to establish care. She is on zepbound for weight loss and has lost 40lbs since she started but works out an hour plus a day and her weight loss is slow.     Patient had routine labs with her gynecologist that showed elevated calcium elevated and lymphocytosis.    She also give history of having been on thyroid medication for many years and stopped a year ago. Her Tsh remains stable but not had antibodies done recently and agrees to have this done    ROS  As per HPI and all other systems reviewed and are negative      Past Medical History:    Thyroid disease       Past Surgical History:   Procedure Laterality Date    Hysterectomy  12/05/2017    total    Upper arm/elbow surgery unlisted Left     Left forearm        Social History     Socioeconomic History    Marital status:    Tobacco Use    Smoking status: Never     Passive exposure: Never    Smokeless tobacco: Never   Vaping Use    Vaping status: Never Used   Substance and Sexual Activity    Alcohol use: No    Drug use: No       History reviewed. No pertinent family history.     Medications Ordered Prior to Encounter[1]      Objective  Vitals:    01/28/25 0826   BP: 124/68   Pulse: 60   Resp: 18   Temp: 97.6 °F (36.4 °C)   TempSrc: Temporal   SpO2: 98%   Weight: 204 lb 6.4 oz (92.7 kg)   Height: 5' 6\" (1.676 m)     Physical Exam  Constitutional:       Appearance: Normal appearance.   HEENT:      Head: Normocephalic and atraumatic.      Eyes: PERRLA no notable nystagmus     Ears: normal on observation     Nose: Nose normal.      Mouth: Mucous membranes are moist.      Neck: no masses no bruit  Cardiovascular:      Rate and Rhythm: Normal rate and regular rhythm.   Pulmonary:      Effort: Pulmonary effort is normal.      Breath sounds: Normal breath sounds.   Abdominal:      General: Bowel sounds are  normal.      Palpations: Abdomen is soft. There is no mass.   Musculoskeletal:         General: Normal range of motion.      Cervical back: Normal range of motion.   Skin:     General: Skin is warm and dry.   Neurological:      General: No focal deficit present.      Mental Status: She is alert and oriented to person, place, and time.   Psychiatric:         Mood and Affect: Mood normal.         Thought Content: Thought content normal.       Assessment and Plan  Doreen was seen today for well adult and physical.    Diagnoses and all orders for this visit:    Lymphocytosis  -     Cancel: CBC W Differential W Platelet [E]; Future  -     Cancel: CBC W Differential W Platelet [E]  -     CBC W Differential W Platelet [E]; Future  -     CBC W Differential W Platelet [E]    Hypercalcemia  -     Basic Metabolic Panel (8) [E]; Future  -     Vitamin D [E]; Future  -     Basic Metabolic Panel (8) [E]  -     Cancel: Vitamin D [E]  -     Comp Metabolic Panel (14) [E]; Future  -     Comp Metabolic Panel (14) [E]  -     VITAMIN D, 25-HYDROXY [36261][Q]; Future  -     VITAMIN D, 25-HYDROXY [80507][Q]    History of thyroid disease  -     Cancel: Thyroid peroxidase & thyroglobulin ab; Future  -     Cancel: Thyroid peroxidase & thyroglobulin ab  -     Thyroid peroxidase & thyroglobulin ab; Future  -     Thyroid peroxidase & thyroglobulin ab           Follow up  No follow-ups on file.      Patient Instructions  Patient Instructions   100g of protein and 50 g of net carbs       Rebel Gallagher MD       This note was created by [employer] voice recognition. Errors in content may be related to improper recognition by the system; efforts to review and correct have been done but errors may still exist. Please be advised the primary purpose of this note is for me to communicate medical care. Standard sentence structure is not always used. Medical terminology and medical abbreviations may be used. There may be grammatical, typographical, and  automated fill ins that may have errors missed in proofreading.          [1]   Current Outpatient Medications on File Prior to Visit   Medication Sig Dispense Refill    Cholecalciferol (VITAMIN D3) 1.25 MG (97093 UT) Oral Cap TAKE ONE CAPSULE BY MOUTH EVERY WEEK AS DIRECTED      Tirzepatide-Weight Management (ZEPBOUND) 5 MG/0.5ML Subcutaneous Solution Auto-injector Inject 5 mg into the skin once a week for 4 doses. 2 mL 0    chlorthalidone 25 MG Oral Tab Take 1 tablet (25 mg total) by mouth daily.      atorvastatin 10 MG Oral Tab Take 1 tablet (10 mg total) by mouth daily. (Patient not taking: Reported on 1/28/2025)      pravastatin 20 MG Oral Tab  (Patient not taking: Reported on 1/28/2025)      Pancrelipase, Lip-Prot-Amyl, (ZENPEP) [id number] units Oral Cap DR Particles Take 1 capsule by mouth 3 (three) times daily before meals. (Patient not taking: Reported on 1/28/2025) 540 capsule 1    Pancrelipase, Lip-Prot-Amyl, (ZENPEP) [id number] units Oral Cap DR Particles Take 1 tablet by mouth daily. (Patient not taking: Reported on 1/28/2025) 36 capsule 0     Current Facility-Administered Medications on File Prior to Visit   Medication Dose Route Frequency Provider Last Rate Last Admin    cyanocobalamin (Vitamin B12) 1000 MCG/ML injection 1,000 mcg  1,000 mcg Intramuscular Once         ipratropium-albuterol (DUONEB) nebulizer solution 3 mL  3 mL Nebulization Once Stacie Martinez APRN

## 2025-03-06 ENCOUNTER — PATIENT MESSAGE (OUTPATIENT)
Dept: INTERNAL MEDICINE CLINIC | Facility: CLINIC | Age: 60
End: 2025-03-06

## 2025-03-06 DIAGNOSIS — E66.9 OBESITY (BMI 30-39.9): Primary | ICD-10-CM

## 2025-03-11 RX ORDER — TIRZEPATIDE 7.5 MG/.5ML
7.5 INJECTION, SOLUTION SUBCUTANEOUS WEEKLY
Qty: 2 ML | Refills: 0 | Status: SHIPPED | OUTPATIENT
Start: 2025-03-11 | End: 2025-03-13

## 2025-03-11 NOTE — TELEPHONE ENCOUNTER
Requesting zepbound 7.5 mg  LOV: 1/23/25  RTC: not noted  Last Relevant Labs: na  Filled: 1/23/25 #2ml with 2 refills  5 mg dose    Future Appointments   Date Time Provider Department Center   3/20/2025 12:00 PM Pily Woods MD [facility]   4/23/2025 12:00 PM Pily Woods MD [facility]   7/31/2025 12:00 PM Pily Woods MD [facility]   8/21/2025 11:00 AM Pily Woods MD [facility]   9/25/2025 12:00 PM Pily Woods MD [facility]   10/28/2025 12:00 PM Pily Woods MD [facility]

## 2025-03-13 ENCOUNTER — OFFICE VISIT (OUTPATIENT)
Dept: INTERNAL MEDICINE CLINIC | Facility: CLINIC | Age: 60
End: 2025-03-13
Payer: COMMERCIAL

## 2025-03-13 VITALS
WEIGHT: 193 LBS | HEART RATE: 89 BPM | SYSTOLIC BLOOD PRESSURE: 120 MMHG | RESPIRATION RATE: 20 BRPM | DIASTOLIC BLOOD PRESSURE: 78 MMHG | HEIGHT: 65 IN | BODY MASS INDEX: 32.15 KG/M2

## 2025-03-13 DIAGNOSIS — E78.2 MIXED HYPERLIPIDEMIA: ICD-10-CM

## 2025-03-13 DIAGNOSIS — Z51.81 ENCOUNTER FOR THERAPEUTIC DRUG MONITORING: Primary | ICD-10-CM

## 2025-03-13 DIAGNOSIS — E66.9 OBESITY (BMI 30-39.9): ICD-10-CM

## 2025-03-13 DIAGNOSIS — I10 ESSENTIAL HYPERTENSION: ICD-10-CM

## 2025-03-13 PROCEDURE — 99213 OFFICE O/P EST LOW 20 MIN: CPT | Performed by: INTERNAL MEDICINE

## 2025-03-13 RX ORDER — METRONIDAZOLE 7.5 MG/G
1 GEL VAGINAL EVERY 4 HOURS
COMMUNITY
Start: 2025-02-19

## 2025-03-13 RX ORDER — METRONIDAZOLE 500 MG/1
500 TABLET ORAL 2 TIMES DAILY
COMMUNITY
Start: 2025-02-19

## 2025-03-13 RX ORDER — LEVOTHYROXINE SODIUM 50 UG/1
50 TABLET ORAL DAILY
COMMUNITY
Start: 2025-02-15

## 2025-03-13 RX ORDER — TIRZEPATIDE 7.5 MG/.5ML
7.5 INJECTION, SOLUTION SUBCUTANEOUS WEEKLY
Qty: 6 ML | Refills: 0 | Status: SHIPPED | OUTPATIENT
Start: 2025-03-13 | End: 2025-04-04

## 2025-03-13 NOTE — PROGRESS NOTES
HISTORY OF PRESENT ILLNESS  Chief Complaint   Patient presents with    Weight Check     Down 9      Doreen M Isacc is a 59 year old female     Down 9 lb from previous   Has been exercising 1.5 hours per day   Doing at 6 am   Does a walk mid day   Started doing the carb counter tomás   Felt that she started to adjust macro   Eating 1370 for calories   Goal 86 protein   Currently at less at 20 gram gram of carbs   Has been 40 grams of carbs on average     Doing tons of salads   Veggies for breakfast   No bread/ pasta   Currently on 3rd week on 7.5 mg weekly       [facility] Follow Up    General Information  Nutrition Recall  Exercise     Sleep                  Wt Readings from Last 6 Encounters:   03/13/25 193 lb (87.5 kg)   01/28/25 204 lb 6.4 oz (92.7 kg)   01/23/25 202 lb (91.6 kg)   10/24/24 203 lb (92.1 kg)   09/25/24 201 lb (91.2 kg)   09/05/24 206 lb 9.6 oz (93.7 kg)            REVIEW OF SYSTEMS  GENERAL HEALTH: feels well otherwise, denied any fevers chills or night sweats   RESPIRATORY: denies shortness of breath   CARDIOVASCULAR: denies chest pain  GI: denies abdominal pain  PSYCH: denies any mood changes      Objective  EXAM  Reviewed most recent set of vitals   Physical Exam:  GENERAL: well developed, well nourished, in no apparent distress, speaking in full sentences comfortably   SKIN: warm, pink, dry without rashes to exposed area   EYES: conjunctiva pink  HEENT: atraumatic, normocephalic  LUNGS: normal work of breathing, non labored  CARDIO: normal work, no exertion  EXTREMITIES: no cyanosis, no clubbing, no edema  NEURO: Oriented times three  PSYCH: pleasant, cooperative, normal mood and affect    Lab Results   Component Value Date    WBC 6.1 04/23/2024    RBC 4.87 04/23/2024    HGB 13.8 04/23/2024    HCT 41.6 04/23/2024    MCV 85.4 04/23/2024    MCH 28.3 04/23/2024    MCHC 33.2 04/23/2024    RDW 13.2 04/23/2024    .0 04/23/2024     Lab Results   Component Value Date    GLU 93 04/23/2024    BUN 18  04/23/2024    BUNCREA 18.4 07/24/2020    CREATSERUM 0.90 04/23/2024    ANIONGAP 6 04/23/2024    GFR 90 03/04/2017    GFRNAA 66 07/24/2020    GFRAA 76 07/24/2020    CA 9.8 04/23/2024    OSMOCALC 290 04/23/2024    ALKPHO 59 04/23/2024    AST 22 04/23/2024    ALT 28 04/23/2024    BILT 0.7 04/23/2024    TP 7.6 04/23/2024    ALB 3.6 04/23/2024    GLOBULIN 4.0 04/23/2024     04/23/2024    K 3.3 (L) 04/23/2024     04/23/2024    CO2 33.0 (H) 04/23/2024     Lab Results   Component Value Date     04/23/2024    A1C 5.6 04/23/2024     Lab Results   Component Value Date    CHOLEST 200 (H) 04/23/2024    TRIG 64 04/23/2024    HDL 55 04/23/2024     (H) 04/23/2024    VLDL 12 04/23/2024    TCHDLRATIO 2.67 02/08/2018    NONHDLC 145 (H) 04/23/2024     Lab Results   Component Value Date    T4F 1.2 04/23/2024    TSH 2.010 04/23/2024     Lab Results   Component Value Date    B12 365 04/23/2024     Lab Results   Component Value Date    VITD 53.4 04/23/2024       Current Outpatient Medications on File Prior to Visit   Medication Sig Dispense Refill    levothyroxine 50 MCG Oral Tab Take 1 tablet (50 mcg total) by mouth daily.      metFORMIN 500 MG Oral Tab Take 1 tablet (500 mg total) by mouth daily.      metroNIDAZOLE 0.75 % Vaginal Gel Place 1 Applicatorful vaginally every 4 (four) hours. AT BEDTIME      metroNIDAZOLE 500 MG Oral Tab Take 1 tablet (500 mg total) by mouth 2 (two) times daily.      Cholecalciferol (VITAMIN D3) 1.25 MG (12703 UT) Oral Cap TAKE ONE CAPSULE BY MOUTH EVERY WEEK AS DIRECTED      pravastatin 20 MG Oral Tab  (Patient not taking: Reported on 1/28/2025)      chlorthalidone 25 MG Oral Tab Take 1 tablet (25 mg total) by mouth daily.       Current Facility-Administered Medications on File Prior to Visit   Medication Dose Route Frequency Provider Last Rate Last Admin    cyanocobalamin (Vitamin B12) 1000 MCG/ML injection 1,000 mcg  1,000 mcg Intramuscular Once         ipratropium-albuterol  (DUONEB) nebulizer solution 3 mL  3 mL Nebulization Once Stacie Martinez APRN           ASSESSMENT  Analyzed weight data:       Diagnoses and all orders for this visit:    Encounter for therapeutic drug monitoring    Obesity (BMI 30-39.9)  -     Tirzepatide-Weight Management (ZEPBOUND) 7.5 MG/0.5ML Subcutaneous Solution Auto-injector; Inject 7.5 mg into the skin once a week for 4 doses.    Essential hypertension [I10]    Mixed hyperlipidemia [E78.2]                  PLAN  Wt Readings from Last 6 Encounters:   03/13/25 193 lb (87.5 kg)   01/28/25 204 lb 6.4 oz (92.7 kg)   01/23/25 202 lb (91.6 kg)   10/24/24 203 lb (92.1 kg)   09/25/24 201 lb (91.2 kg)   09/05/24 206 lb 9.6 oz (93.7 kg)     Weight Max: 243 lb  Down 9  Down 48 lb total  Total time spent on chart review, pre-charting, obtaining history, counseling, and educating, reviewing labs was 30 minutes.  Reviewed GI side effects   Trial of zenpep   -advised of side effects and adverse effects of this medication    Increase  zepbound 7.5  mg q weekly   -advised of side effects and adverse effects of this medication  Side effects much better    May 2018-June 2018 for QYSMIA : cannot take increased bloodpressure   Cannot take contrave due to increase blood pressure in 2020   PENG and VALENTIN are on back order per FDA with no end in sight.  -advised of side effects and adverse effects of this medication  CANNOT TAKE:: has done  belviq, saxenda, qsymia, metformin, VSL #3, topamax, diethylpropion,   Nutrition: low carb diet/ recommended to eat breakfast daily/ regular protein intake  Follow up with dietitian and psychologist as recommended.  Discussed the role of sleep and stress in weight management.  Counseled on comprehensive weight loss plan including attention to nutrition, exercise and behavior/stress management for success. See patient instruction below for more details.  Discussed strategies to overcome barriers to successful weight loss and weight  maintenance  FITTE: ACSM recommendations (150-300 minutes/ week in active weight loss)       There are no Patient Instructions on file for this visit.    No follow-ups on file.    Patient verbalizes understanding.

## 2025-03-17 ENCOUNTER — PATIENT MESSAGE (OUTPATIENT)
Dept: FAMILY MEDICINE CLINIC | Facility: CLINIC | Age: 60
End: 2025-03-17

## 2025-04-23 ENCOUNTER — OFFICE VISIT (OUTPATIENT)
Dept: INTERNAL MEDICINE CLINIC | Facility: CLINIC | Age: 60
End: 2025-04-23
Payer: COMMERCIAL

## 2025-04-23 VITALS
HEART RATE: 89 BPM | SYSTOLIC BLOOD PRESSURE: 118 MMHG | BODY MASS INDEX: 31.82 KG/M2 | HEIGHT: 65 IN | DIASTOLIC BLOOD PRESSURE: 78 MMHG | WEIGHT: 191 LBS | RESPIRATION RATE: 22 BRPM

## 2025-04-23 DIAGNOSIS — Z51.81 ENCOUNTER FOR THERAPEUTIC DRUG MONITORING: Primary | ICD-10-CM

## 2025-04-23 DIAGNOSIS — E66.9 OBESITY (BMI 30-39.9): ICD-10-CM

## 2025-04-23 DIAGNOSIS — E53.8 B12 DEFICIENCY: ICD-10-CM

## 2025-04-23 PROCEDURE — 99213 OFFICE O/P EST LOW 20 MIN: CPT | Performed by: INTERNAL MEDICINE

## 2025-04-23 RX ORDER — TIRZEPATIDE 10 MG/.5ML
10 INJECTION, SOLUTION SUBCUTANEOUS WEEKLY
Qty: 2 ML | Refills: 2 | Status: SHIPPED | OUTPATIENT
Start: 2025-04-23 | End: 2025-05-15

## 2025-04-23 RX ORDER — PANCRELIPASE LIPASE, PANCRELIPASE PROTEASE, PANCRELIPASE AMYLASE 252600; 60000; 189600 [USP'U]/1; [USP'U]/1; [USP'U]/1
1 CAPSULE, DELAYED RELEASE ORAL
COMMUNITY
Start: 2025-04-21

## 2025-04-23 RX ORDER — ATORVASTATIN CALCIUM 10 MG/1
10 TABLET, FILM COATED ORAL DAILY
COMMUNITY
Start: 2025-03-31

## 2025-04-23 NOTE — PROGRESS NOTES
HISTORY OF PRESENT ILLNESS  Chief Complaint   Patient presents with    Weight Check     Down 2      Doreen Dexter is a 59 year old female     Down 2   Had covid and was traveling   No recurrence of GI side effects with going up on the 7.5   Feels frustrated with slow loss.   Ready to get back in the gym       [facility] Follow Up    General Information  Nutrition Recall  Exercise     Sleep                  Wt Readings from Last 6 Encounters:   04/23/25 191 lb (86.6 kg)   03/13/25 193 lb (87.5 kg)   01/28/25 204 lb 6.4 oz (92.7 kg)   01/23/25 202 lb (91.6 kg)   10/24/24 203 lb (92.1 kg)   09/25/24 201 lb (91.2 kg)            REVIEW OF SYSTEMS  GENERAL HEALTH: feels well otherwise, denied any fevers chills or night sweats   RESPIRATORY: denies shortness of breath   CARDIOVASCULAR: denies chest pain  GI: denies abdominal pain  PSYCH: denies any mood changes      Objective  EXAM  Reviewed most recent set of vitals   Physical Exam:  GENERAL: well developed, well nourished, in no apparent distress, speaking in full sentences comfortably   SKIN: warm, pink, dry without rashes to exposed area   EYES: conjunctiva pink  HEENT: atraumatic, normocephalic  LUNGS: normal work of breathing, non labored  CARDIO: normal work, no exertion  EXTREMITIES: no cyanosis, no clubbing, no edema  NEURO: Oriented times three  PSYCH: pleasant, cooperative, normal mood and affect    Lab Results   Component Value Date    WBC 6.1 04/23/2024    RBC 4.87 04/23/2024    HGB 13.8 04/23/2024    HCT 41.6 04/23/2024    MCV 85.4 04/23/2024    MCH 28.3 04/23/2024    MCHC 33.2 04/23/2024    RDW 13.2 04/23/2024    .0 04/23/2024     Lab Results   Component Value Date    GLU 93 04/23/2024    BUN 18 04/23/2024    BUNCREA 18.4 07/24/2020    CREATSERUM 0.90 04/23/2024    ANIONGAP 6 04/23/2024    GFR 90 03/04/2017    GFRNAA 66 07/24/2020    GFRAA 76 07/24/2020    CA 9.8 04/23/2024    OSMOCALC 290 04/23/2024    ALKPHO 59 04/23/2024    AST 22 04/23/2024    ALT  28 04/23/2024    BILT 0.7 04/23/2024    TP 7.6 04/23/2024    ALB 3.6 04/23/2024    GLOBULIN 4.0 04/23/2024     04/23/2024    K 3.3 (L) 04/23/2024     04/23/2024    CO2 33.0 (H) 04/23/2024     Lab Results   Component Value Date     04/23/2024    A1C 5.6 04/23/2024     Lab Results   Component Value Date    CHOLEST 200 (H) 04/23/2024    TRIG 64 04/23/2024    HDL 55 04/23/2024     (H) 04/23/2024    VLDL 12 04/23/2024    TCHDLRATIO 2.67 02/08/2018    NONHDLC 145 (H) 04/23/2024     Lab Results   Component Value Date    T4F 1.2 04/23/2024    TSH 2.010 04/23/2024     Lab Results   Component Value Date    B12 365 04/23/2024     Lab Results   Component Value Date    VITD 53.4 04/23/2024       Current Outpatient Medications on File Prior to Visit   Medication Sig Dispense Refill    atorvastatin 10 MG Oral Tab Take 1 tablet (10 mg total) by mouth daily.      ZENPEP [id number] units Oral Cap DR Particles Take 1 capsule by mouth 3 (three) times daily before meals.      levothyroxine 50 MCG Oral Tab Take 1 tablet (50 mcg total) by mouth daily.      Cholecalciferol (VITAMIN D3) 1.25 MG (33893 UT) Oral Cap TAKE ONE CAPSULE BY MOUTH EVERY WEEK AS DIRECTED      chlorthalidone 25 MG Oral Tab Take 1 tablet (25 mg total) by mouth daily.       Current Facility-Administered Medications on File Prior to Visit   Medication Dose Route Frequency Provider Last Rate Last Admin    cyanocobalamin (Vitamin B12) 1000 MCG/ML injection 1,000 mcg  1,000 mcg Intramuscular Once         ipratropium-albuterol (DUONEB) nebulizer solution 3 mL  3 mL Nebulization Once Stacie Martinez APRN           ASSESSMENT  Analyzed weight data:  Weight Calculations  Initial Weight: 231 lbs  Initial Weight Date: 06/11/19  Today's Weight: 191 lbs  5% Goal: 11.55  10% Goal: 23.1  Total Weight Loss: 40 lbs    Diagnoses and all orders for this visit:    Encounter for therapeutic drug monitoring    Obesity (BMI 30-39.9)    Other orders  -      Tirzepatide-Weight Management (ZEPBOUND) 10 MG/0.5ML Subcutaneous Solution Auto-injector; Inject 10 mg into the skin once a week for 4 doses.                  PLAN  Wt Readings from Last 6 Encounters:   04/23/25 191 lb (86.6 kg)   03/13/25 193 lb (87.5 kg)   01/28/25 204 lb 6.4 oz (92.7 kg)   01/23/25 202 lb (91.6 kg)   10/24/24 203 lb (92.1 kg)   09/25/24 201 lb (91.2 kg)     Weight Max: 243 lb  Down 2  Down 50 lb total  Reviewed GI side effects   Increase  zepbound to 10mg  mg q weekly   -advised of side effects and adverse effects of this medication  Side effects much better  Chechk b12   May 2018-June 2018 for QYSMIA : cannot take increased bloodpressure   Cannot take contrave due to increase blood pressure in 2020   PENG and VALENTIN are on back order per FDA with no end in sight.  -advised of side effects and adverse effects of this medication  CANNOT TAKE:: has done  belviq, saxenda, qsymia, metformin, VSL #3, topamax, diethylpropion,   Nutrition: low carb diet/ recommended to eat breakfast daily/ regular protein intake  Follow up with dietitian and psychologist as recommended.  Discussed the role of sleep and stress in weight management.  Counseled on comprehensive weight loss plan including attention to nutrition, exercise and behavior/stress management for success. See patient instruction below for more details.  Discussed strategies to overcome barriers to successful weight loss and weight maintenance  FITTE: ACSM recommendations (150-300 minutes/ week in active weight loss)       There are no Patient Instructions on file for this visit.    No follow-ups on file.    Patient verbalizes understanding.

## 2025-04-23 NOTE — PROGRESS NOTES
The following individual(s) verbally consented to be recorded using ambient AI listening technology and understand that they can each withdraw their consent to this listening technology at any point by asking the clinician to turn off or pause the recording:    Patient name: Doreen SALCEDO Isacc  Additional names:

## 2025-05-27 ENCOUNTER — OFFICE VISIT (OUTPATIENT)
Dept: INTERNAL MEDICINE CLINIC | Facility: CLINIC | Age: 60
End: 2025-05-27
Payer: COMMERCIAL

## 2025-05-27 VITALS
BODY MASS INDEX: 30.49 KG/M2 | HEIGHT: 65 IN | HEART RATE: 51 BPM | WEIGHT: 183 LBS | OXYGEN SATURATION: 98 % | RESPIRATION RATE: 18 BRPM | SYSTOLIC BLOOD PRESSURE: 110 MMHG | DIASTOLIC BLOOD PRESSURE: 72 MMHG

## 2025-05-27 DIAGNOSIS — E78.2 MIXED HYPERLIPIDEMIA: ICD-10-CM

## 2025-05-27 DIAGNOSIS — R63.2 BINGE EATING: ICD-10-CM

## 2025-05-27 DIAGNOSIS — E66.9 OBESITY (BMI 30-39.9): ICD-10-CM

## 2025-05-27 DIAGNOSIS — Z51.81 ENCOUNTER FOR THERAPEUTIC DRUG MONITORING: Primary | ICD-10-CM

## 2025-05-27 DIAGNOSIS — E53.8 B12 DEFICIENCY: ICD-10-CM

## 2025-05-27 DIAGNOSIS — I10 ESSENTIAL HYPERTENSION: ICD-10-CM

## 2025-05-27 PROCEDURE — 99214 OFFICE O/P EST MOD 30 MIN: CPT | Performed by: NURSE PRACTITIONER

## 2025-05-27 RX ORDER — TIRZEPATIDE 10 MG/.5ML
10 INJECTION, SOLUTION SUBCUTANEOUS WEEKLY
COMMUNITY
Start: 2025-05-19

## 2025-05-27 NOTE — PROGRESS NOTES
HISTORY OF PRESENT ILLNESS  Chief Complaint   Patient presents with    Weight Check     Down 8#     Doreen Dexter is a 59 year old female who presents today for a follow-up on medical weight loss.     Pt has been compliant on Zepbound 10mg weekly subcutaneous injections.   Pt states she is exercising 1.5hrs a day.  Pt states she does cardio and weight resistance as well.  Pt states she is watching her carb intake, really only getting 50g of carbs a day or less.  Pt denies negative side effects while on 10mg.  Pt wants to stay on 10mg dose for now, just picked up a month refill.    [facility] Follow Up    General Information  Nutrition Recall  Exercise     Sleep              Wt Readings from Last 6 Encounters:   05/27/25 183 lb (83 kg)   04/23/25 191 lb (86.6 kg)   03/13/25 193 lb (87.5 kg)   01/28/25 204 lb 6.4 oz (92.7 kg)   01/23/25 202 lb (91.6 kg)   10/24/24 203 lb (92.1 kg)     REVIEW OF SYSTEMS  GENERAL HEALTH: feels well otherwise, denied any fevers chills or night sweats   RESPIRATORY: denies shortness of breath   CARDIOVASCULAR: denies chest pain  GI: denies abdominal pain  PSYCH: denies any mood changes    Objective  EXAM  Reviewed most recent set of vitals   Physical Exam:  GENERAL: well developed, well nourished, in no apparent distress, speaking in full sentences comfortably   SKIN: warm, pink, dry without rashes to exposed area   EYES: conjunctiva pink  HEENT: atraumatic, normocephalic  LUNGS: normal work of breathing, non labored  CARDIO: normal work, no exertion  EXTREMITIES: no cyanosis, no clubbing, no edema  NEURO: Oriented times three  PSYCH: pleasant, cooperative, normal mood and affect    Lab Results   Component Value Date    WBC 6.1 04/23/2024    RBC 4.87 04/23/2024    HGB 13.8 04/23/2024    HCT 41.6 04/23/2024    MCV 85.4 04/23/2024    MCH 28.3 04/23/2024    MCHC 33.2 04/23/2024    RDW 13.2 04/23/2024    .0 04/23/2024     Lab Results   Component Value Date    GLU 93 04/23/2024    BUN 18  04/23/2024    BUNCREA 18.4 07/24/2020    CREATSERUM 0.90 04/23/2024    ANIONGAP 6 04/23/2024    GFR 90 03/04/2017    GFRNAA 66 07/24/2020    GFRAA 76 07/24/2020    CA 9.8 04/23/2024    OSMOCALC 290 04/23/2024    ALKPHO 59 04/23/2024    AST 22 04/23/2024    ALT 28 04/23/2024    BILT 0.7 04/23/2024    TP 7.6 04/23/2024    ALB 3.6 04/23/2024    GLOBULIN 4.0 04/23/2024     04/23/2024    K 3.3 (L) 04/23/2024     04/23/2024    CO2 33.0 (H) 04/23/2024     Lab Results   Component Value Date     04/23/2024    A1C 5.6 04/23/2024     Lab Results   Component Value Date    CHOLEST 200 (H) 04/23/2024    TRIG 64 04/23/2024    HDL 55 04/23/2024     (H) 04/23/2024    VLDL 12 04/23/2024    TCHDLRATIO 2.67 02/08/2018    NONHDLC 145 (H) 04/23/2024     Lab Results   Component Value Date    T4F 1.2 04/23/2024    TSH 2.010 04/23/2024     Lab Results   Component Value Date    B12 365 04/23/2024     Lab Results   Component Value Date    VITD 53.4 04/23/2024     Current Outpatient Medications on File Prior to Visit   Medication Sig Dispense Refill    ZEPBOUND 10 MG/0.5ML Subcutaneous Solution Auto-injector Inject 10 mg into the skin once a week.      levothyroxine 50 MCG Oral Tab Take 1 tablet (50 mcg total) by mouth daily.      Cholecalciferol (VITAMIN D3) 1.25 MG (93703 UT) Oral Cap TAKE ONE CAPSULE BY MOUTH EVERY WEEK AS DIRECTED      chlorthalidone 25 MG Oral Tab Take 1 tablet (25 mg total) by mouth daily.      atorvastatin 10 MG Oral Tab Take 1 tablet (10 mg total) by mouth daily. (Patient not taking: Reported on 5/27/2025)      ZENPEP [id number] units Oral Cap DR Particles Take 1 capsule by mouth 3 (three) times daily before meals. (Patient not taking: Reported on 5/27/2025)       Current Facility-Administered Medications on File Prior to Visit   Medication Dose Route Frequency Provider Last Rate Last Admin    cyanocobalamin (Vitamin B12) 1000 MCG/ML injection 1,000 mcg  1,000 mcg Intramuscular Once          ipratropium-albuterol (DUONEB) nebulizer solution 3 mL  3 mL Nebulization Once Stacie Martinez APRN         ASSESSMENT  Analyzed weight data:       Diagnoses and all orders for this visit:    Encounter for therapeutic drug monitoring    Obesity (BMI 30-39.9)    Essential hypertension [I10]    Mixed hyperlipidemia [E78.2]    Binge eating    B12 deficiency        PLAN  Wt Readings from Last 6 Encounters:   05/27/25 183 lb (83 kg)   04/23/25 191 lb (86.6 kg)   03/13/25 193 lb (87.5 kg)   01/28/25 204 lb 6.4 oz (92.7 kg)   01/23/25 202 lb (91.6 kg)   10/24/24 203 lb (92.1 kg)     Weight Max: 243 lb  Down 8  Down 48 lb total    Pt to get annual labs with vit B12 done with PCP annual appt  Continue Zepbound 10mg weekly injections  Advised of side effects and adverse effects of this medication  CANNOT TAKE:: has done  belviq, saxenda, qsymia, metformin, VSL #3, topamax, diethylpropion  May 2018-June 2018 for QYSMIA : cannot take increased blood pressure  Cannot take contrave due to increase blood pressure in 2020   Pt will likely go up to 12.5mg zepbound at [facility] appointment  Pt states July 1 her insurance will no longer cover Zepbound but will cover Wegovy   Nutrition: low carb diet/ recommended to eat breakfast daily/ regular protein intake  Pt wanted to do body comp today   Current body weight: 181.6lbs  Total body fat: 39.6%  Visceral fat: 10  Muscle Mass: 27.5lbs (15%)  Total body water: 44.6%   Follow up with dietitian and psychologist as recommended.  Discussed the role of sleep and stress in weight management.  Counseled on comprehensive weight loss plan including attention to nutrition, exercise and behavior/stress management for success. See patient instruction below for more details.  Discussed strategies to overcome barriers to successful weight loss and weight maintenance  FITTE: ACSM recommendations (150-300 minutes/ week in active weight loss)     Patient Instructions   Protein: 86g  Carbs: 104g  Calories:  1400    No follow-ups on file.    Patient verbalizes understanding.    Mahogany Paul, TRESSA, FNP-BC

## 2025-06-23 ENCOUNTER — TELEMEDICINE (OUTPATIENT)
Dept: INTERNAL MEDICINE CLINIC | Facility: CLINIC | Age: 60
End: 2025-06-23
Payer: COMMERCIAL

## 2025-06-23 DIAGNOSIS — I10 ESSENTIAL HYPERTENSION: ICD-10-CM

## 2025-06-23 DIAGNOSIS — E66.9 OBESITY (BMI 30-39.9): ICD-10-CM

## 2025-06-23 DIAGNOSIS — Z51.81 ENCOUNTER FOR THERAPEUTIC DRUG MONITORING: Primary | ICD-10-CM

## 2025-06-23 RX ORDER — TIRZEPATIDE 12.5 MG/.5ML
12.5 INJECTION, SOLUTION SUBCUTANEOUS WEEKLY
Qty: 2 ML | Refills: 2 | Status: SHIPPED | OUTPATIENT
Start: 2025-06-23 | End: 2025-07-15

## 2025-06-23 NOTE — PROGRESS NOTES
The following individual(s) verbally consented to be recorded using ambient AI listening technology and understand that they can each withdraw their consent to this listening technology at any point by asking the clinician to turn off or pause the recording:    Patient name: Doreen Dexter  Additional names:  n/a     HISTORY OF PRESENT ILLNESS  No chief complaint on file.      Doreen Dexter is a 59 year old female here for follow up in medical weight loss program.   [facility] Follow Up    General Information  Nutrition Recall  Exercise   Patient stated average level of stress: 1  Sleep              History of Present Illness  Doreen Dexter is a 59 year old female who presents for follow-up regarding weight management.    She weighs 179 pounds, down from 183 pounds a month ago, attributing her weight loss to monitoring carbohydrate intake and maintaining regular physical activity. She walks a mile three times a day while on vacation and engages in an hour to an hour and a half of gym workouts at home, including 40 minutes of weight training and 30 minutes of cycling.    She has been on Zepbound 10 mg for nearly three months and feels hungry despite the medication. She received conflicting information from her insurance regarding coverage but was ultimately informed that her coverage would not change. She has not experienced any side effects from the medication.    Her diet includes measured portions of carbohydrates, limited fruit intake due to sugar content, and a focus on vegetables and salads. She drinks only water and avoids other beverages.    She meets with a  twice a month for weight training and is concerned about skin changes due to weight loss, although she has not yet pursued specific treatments for this.    Wt Readings from Last 6 Encounters:   05/27/25 183 lb (83 kg)   04/23/25 191 lb (86.6 kg)   03/13/25 193 lb (87.5 kg)   01/28/25 204 lb 6.4 oz (92.7 kg)   01/23/25 202 lb (91.6 kg)    10/24/24 203 lb (92.1 kg)              Breakfast Lunch Dinner Snacks Fluids   Reviewed           REVIEW OF SYSTEMS  GENERAL HEALTH: feels well otherwise, denied any fevers chills or night sweats   RESPIRATORY: denies shortness of breath   CARDIOVASCULAR: denies chest pain  GI: denies abdominal pain    EXAM  LMP 11/28/2017 (Approximate)   GENERAL: well developed, well nourished,in no apparent distress, A/O x3  SKIN: no rashes,no suspicious lesions  HEENT: atraumatic, normocephalic, OP-clear, PERRL  NECK: supple,no adenopathy  LUNGS: clear to auscultation bilaterally   CARDIO: RRR without murmur  GI: good BS's,NT/ND, no masses or HSM  EXTREMITIES: no cyanosis, no clubbing, no edema    Lab Results   Component Value Date    WBC 6.1 04/23/2024    RBC 4.87 04/23/2024    HGB 13.8 04/23/2024    HCT 41.6 04/23/2024    MCV 85.4 04/23/2024    MCH 28.3 04/23/2024    MCHC 33.2 04/23/2024    RDW 13.2 04/23/2024    .0 04/23/2024     Lab Results   Component Value Date    GLU 93 04/23/2024    BUN 18 04/23/2024    BUNCREA 18.4 07/24/2020    CREATSERUM 0.90 04/23/2024    ANIONGAP 6 04/23/2024    GFR 90 03/04/2017    GFRNAA 66 07/24/2020    GFRAA 76 07/24/2020    CA 9.8 04/23/2024    OSMOCALC 290 04/23/2024    ALKPHO 59 04/23/2024    AST 22 04/23/2024    ALT 28 04/23/2024    BILT 0.7 04/23/2024    TP 7.6 04/23/2024    ALB 3.6 04/23/2024    GLOBULIN 4.0 04/23/2024     04/23/2024    K 3.3 (L) 04/23/2024     04/23/2024    CO2 33.0 (H) 04/23/2024     Lab Results   Component Value Date     04/23/2024    A1C 5.6 04/23/2024     Lab Results   Component Value Date    CHOLEST 200 (H) 04/23/2024    TRIG 64 04/23/2024    HDL 55 04/23/2024     (H) 04/23/2024    VLDL 12 04/23/2024    TCHDLRATIO 2.67 02/08/2018    NONHDLC 145 (H) 04/23/2024     Lab Results   Component Value Date    T4F 1.2 04/23/2024    TSH 2.010 04/23/2024     Lab Results   Component Value Date    B12 365 04/23/2024     Lab Results   Component  Value Date    VITD 53.4 04/23/2024       Medications Ordered Prior to Encounter[1]    ASSESSMENT  Analyzed weight data:     [facility] Information  Patient type: Non-Surgical   Date of Initial Weight: 5/17/2022 Initial Weight: 231      5% Goal: 11.55 10% Goal: 23.1    Initial BMI: 37.2         Have any comorbidities improved since the last visit?   Hypertension DM 2 Dyslipidemia Osteoarthritis Sleep Apnea                    Diagnoses and all orders for this visit:    Encounter for therapeutic drug monitoring    Essential hypertension [I10]    Obesity (BMI 30-39.9)    Other orders  -     Tirzepatide-Weight Management (ZEPBOUND) 12.5 MG/0.5ML Subcutaneous Solution Auto-injector; Inject 12.5 mg into the skin once a week for 4 doses.        PLAN  Assessment & Plan  Obesity  Weight decreased to 179 lbs. Adheres to low-carb diet and exercise. Reports hunger, indicating potential plateau. No side effects from Zepbound. Consider dosage increase.  - Increase Zepbound dosage to 12.5 mg weekly  - Continue diet and exercise regimen.  - Send prescription to [facility] in [place].  - Discuss skin treatments for tightening.    There are no Patient Instructions on file for this visit.    No follow-ups on file.    Patient verbalizes understanding.    Pily Woods MD           [1]   Current Outpatient Medications on File Prior to Visit   Medication Sig Dispense Refill    atorvastatin 10 MG Oral Tab Take 1 tablet (10 mg total) by mouth daily. (Patient not taking: Reported on 5/27/2025)      ZENPEP 92227-540270 units Oral Cap DR Particles Take 1 capsule by mouth 3 (three) times daily before meals. (Patient not taking: Reported on 5/27/2025)      levothyroxine 50 MCG Oral Tab Take 1 tablet (50 mcg total) by mouth daily.      Cholecalciferol (VITAMIN D3) 1.25 MG (53295 UT) Oral Cap TAKE ONE CAPSULE BY MOUTH EVERY WEEK AS DIRECTED      chlorthalidone 25 MG Oral Tab Take 1 tablet (25 mg total) by mouth daily.       Current Facility-Administered  Medications on File Prior to Visit   Medication Dose Route Frequency Provider Last Rate Last Admin    cyanocobalamin (Vitamin B12) 1000 MCG/ML injection 1,000 mcg  1,000 mcg Intramuscular Once         ipratropium-albuterol (DUONEB) nebulizer solution 3 mL  3 mL Nebulization Once Stacie Martinez, ARAM

## 2025-07-16 ENCOUNTER — HOSPITAL ENCOUNTER (EMERGENCY)
Facility: HOSPITAL | Age: 60
Discharge: HOME | End: 2025-07-16
Attending: STUDENT IN AN ORGANIZED HEALTH CARE EDUCATION/TRAINING PROGRAM
Payer: COMMERCIAL

## 2025-07-16 ENCOUNTER — LAB ENCOUNTER (OUTPATIENT)
Dept: LAB | Age: 60
End: 2025-07-16
Attending: INTERNAL MEDICINE
Payer: COMMERCIAL

## 2025-07-16 ENCOUNTER — RESULTS FOLLOW-UP (OUTPATIENT)
Dept: FAMILY MEDICINE CLINIC | Facility: CLINIC | Age: 60
End: 2025-07-16

## 2025-07-16 VITALS
DIASTOLIC BLOOD PRESSURE: 75 MMHG | SYSTOLIC BLOOD PRESSURE: 118 MMHG | TEMPERATURE: 98 F | HEART RATE: 60 BPM | OXYGEN SATURATION: 100 % | RESPIRATION RATE: 16 BRPM

## 2025-07-16 DIAGNOSIS — I10 ESSENTIAL HYPERTENSION, MALIGNANT: ICD-10-CM

## 2025-07-16 DIAGNOSIS — E53.8 B12 DEFICIENCY: ICD-10-CM

## 2025-07-16 DIAGNOSIS — Z51.81 ENCOUNTER FOR THERAPEUTIC DRUG MONITORING: ICD-10-CM

## 2025-07-16 DIAGNOSIS — I10 ESSENTIAL HYPERTENSION: ICD-10-CM

## 2025-07-16 DIAGNOSIS — E87.6 HYPOKALEMIA: Primary | ICD-10-CM

## 2025-07-16 DIAGNOSIS — I51.9 MYXEDEMA HEART DISEASE: ICD-10-CM

## 2025-07-16 DIAGNOSIS — E66.9 OBESITY: ICD-10-CM

## 2025-07-16 DIAGNOSIS — R73.01 IFG (IMPAIRED FASTING GLUCOSE): ICD-10-CM

## 2025-07-16 DIAGNOSIS — R06.09 DYSPNEA ON EXERTION: Primary | ICD-10-CM

## 2025-07-16 DIAGNOSIS — R25.2 MUSCLE CRAMPS: ICD-10-CM

## 2025-07-16 DIAGNOSIS — E78.2 MIXED HYPERLIPIDEMIA: ICD-10-CM

## 2025-07-16 DIAGNOSIS — R25.2 MUSCLE CRAMPS: Primary | ICD-10-CM

## 2025-07-16 DIAGNOSIS — E03.9 MYXEDEMA HEART DISEASE: ICD-10-CM

## 2025-07-16 DIAGNOSIS — E66.9 OBESITY (BMI 30-39.9): ICD-10-CM

## 2025-07-16 LAB
ALBUMIN SERPL-MCNC: 4.6 G/DL (ref 3.2–4.8)
ALBUMIN SERPL-MCNC: 4.6 G/DL (ref 3.2–4.8)
ALBUMIN/GLOB SERPL: 1.4 {RATIO} (ref 1–2)
ALBUMIN/GLOB SERPL: 1.4 {RATIO} (ref 1–2)
ALP LIVER SERPL-CCNC: 62 U/L (ref 46–118)
ALP LIVER SERPL-CCNC: 70 U/L (ref 46–118)
ALT SERPL-CCNC: 17 U/L (ref 10–49)
ALT SERPL-CCNC: 17 U/L (ref 10–49)
ANION GAP SERPL CALC-SCNC: 10 MMOL/L (ref 0–18)
ANION GAP SERPL CALC-SCNC: 10 MMOL/L (ref 0–18)
AST SERPL-CCNC: 20 U/L (ref ?–34)
AST SERPL-CCNC: 20 U/L (ref ?–34)
BASOPHILS # BLD AUTO: 0.04 X10(3) UL (ref 0–0.2)
BASOPHILS # BLD AUTO: 0.06 X10(3) UL (ref 0–0.2)
BASOPHILS NFR BLD AUTO: 0.6 %
BASOPHILS NFR BLD AUTO: 0.8 %
BILIRUB SERPL-MCNC: 0.7 MG/DL (ref 0.3–1.2)
BILIRUB SERPL-MCNC: 1 MG/DL (ref 0.3–1.2)
BUN BLD-MCNC: 14 MG/DL (ref 9–23)
BUN BLD-MCNC: 16 MG/DL (ref 9–23)
CALCIUM BLD-MCNC: 10 MG/DL (ref 8.7–10.6)
CALCIUM BLD-MCNC: 9.9 MG/DL (ref 8.7–10.6)
CHLORIDE SERPL-SCNC: 97 MMOL/L (ref 98–112)
CHLORIDE SERPL-SCNC: 97 MMOL/L (ref 98–112)
CHOLEST SERPL-MCNC: 192 MG/DL (ref ?–200)
CO2 SERPL-SCNC: 32 MMOL/L (ref 21–32)
CO2 SERPL-SCNC: 33 MMOL/L (ref 21–32)
CREAT BLD-MCNC: 0.97 MG/DL (ref 0.55–1.02)
CREAT BLD-MCNC: 1 MG/DL (ref 0.55–1.02)
EGFRCR SERPLBLD CKD-EPI 2021: 65 ML/MIN/1.73M2 (ref 60–?)
EGFRCR SERPLBLD CKD-EPI 2021: 68 ML/MIN/1.73M2 (ref 60–?)
EOSINOPHIL # BLD AUTO: 0.09 X10(3) UL (ref 0–0.7)
EOSINOPHIL # BLD AUTO: 0.14 X10(3) UL (ref 0–0.7)
EOSINOPHIL NFR BLD AUTO: 1.4 %
EOSINOPHIL NFR BLD AUTO: 1.8 %
ERYTHROCYTE [DISTWIDTH] IN BLOOD BY AUTOMATED COUNT: 12.7 %
ERYTHROCYTE [DISTWIDTH] IN BLOOD BY AUTOMATED COUNT: 12.8 %
FASTING PATIENT LIPID ANSWER: YES
FASTING STATUS PATIENT QL REPORTED: YES
FOLATE SERPL-MCNC: 11.2 NG/ML (ref 5.4–?)
GLOBULIN PLAS-MCNC: 3.2 G/DL (ref 2–3.5)
GLOBULIN PLAS-MCNC: 3.2 G/DL (ref 2–3.5)
GLUCOSE BLD-MCNC: 70 MG/DL (ref 70–99)
GLUCOSE BLD-MCNC: 86 MG/DL (ref 70–99)
HCT VFR BLD AUTO: 41.2 % (ref 35–48)
HCT VFR BLD AUTO: 42.6 % (ref 35–48)
HDLC SERPL-MCNC: 57 MG/DL (ref 40–59)
HGB BLD-MCNC: 14 G/DL (ref 12–16)
HGB BLD-MCNC: 14.2 G/DL (ref 12–16)
IMM GRANULOCYTES # BLD AUTO: 0.01 X10(3) UL (ref 0–1)
IMM GRANULOCYTES # BLD AUTO: 0.02 X10(3) UL (ref 0–1)
IMM GRANULOCYTES NFR BLD: 0.1 %
IMM GRANULOCYTES NFR BLD: 0.3 %
LDLC SERPL CALC-MCNC: 124 MG/DL (ref ?–100)
LYMPHOCYTES # BLD AUTO: 2.98 X10(3) UL (ref 1–4)
LYMPHOCYTES # BLD AUTO: 3.34 X10(3) UL (ref 1–4)
LYMPHOCYTES NFR BLD AUTO: 43.2 %
LYMPHOCYTES NFR BLD AUTO: 45.4 %
MAGNESIUM SERPL-MCNC: 1.9 MG/DL (ref 1.6–2.6)
MCH RBC QN AUTO: 28.6 PG (ref 26–34)
MCH RBC QN AUTO: 29.2 PG (ref 26–34)
MCHC RBC AUTO-ENTMCNC: 32.9 G/DL (ref 31–37)
MCHC RBC AUTO-ENTMCNC: 34.5 G/DL (ref 31–37)
MCV RBC AUTO: 84.6 FL (ref 80–100)
MCV RBC AUTO: 87.1 FL (ref 80–100)
MONOCYTES # BLD AUTO: 0.48 X10(3) UL (ref 0.1–1)
MONOCYTES # BLD AUTO: 0.62 X10(3) UL (ref 0.1–1)
MONOCYTES NFR BLD AUTO: 7.3 %
MONOCYTES NFR BLD AUTO: 8 %
NEUTROPHILS # BLD AUTO: 2.95 X10 (3) UL (ref 1.5–7.7)
NEUTROPHILS # BLD AUTO: 2.95 X10(3) UL (ref 1.5–7.7)
NEUTROPHILS # BLD AUTO: 3.56 X10 (3) UL (ref 1.5–7.7)
NEUTROPHILS # BLD AUTO: 3.56 X10(3) UL (ref 1.5–7.7)
NEUTROPHILS NFR BLD AUTO: 45 %
NEUTROPHILS NFR BLD AUTO: 46.1 %
NONHDLC SERPL-MCNC: 135 MG/DL (ref ?–130)
OSMOLALITY SERPL CALC.SUM OF ELEC: 287 MOSM/KG (ref 275–295)
OSMOLALITY SERPL CALC.SUM OF ELEC: 290 MOSM/KG (ref 275–295)
PLATELET # BLD AUTO: 301 10(3)UL (ref 150–450)
PLATELET # BLD AUTO: 303 10(3)UL (ref 150–450)
POTASSIUM SERPL-SCNC: 2.7 MMOL/L (ref 3.5–5.1)
POTASSIUM SERPL-SCNC: 2.8 MMOL/L (ref 3.5–5.1)
PROT SERPL-MCNC: 7.8 G/DL (ref 5.7–8.2)
PROT SERPL-MCNC: 7.8 G/DL (ref 5.7–8.2)
RBC # BLD AUTO: 4.87 X10(6)UL (ref 3.8–5.3)
RBC # BLD AUTO: 4.89 X10(6)UL (ref 3.8–5.3)
SODIUM SERPL-SCNC: 139 MMOL/L (ref 136–145)
SODIUM SERPL-SCNC: 140 MMOL/L (ref 136–145)
THYROGLOB SERPL-MCNC: 22 U/ML (ref ?–60)
THYROPEROXIDASE AB SERPL-ACNC: <28 U/ML (ref ?–60)
TRIGL SERPL-MCNC: 61 MG/DL (ref 30–149)
TROPONIN I SERPL HS-MCNC: 3 NG/L (ref ?–34)
TSI SER-ACNC: 2.21 UIU/ML (ref 0.55–4.78)
VIT B12 SERPL-MCNC: 382 PG/ML (ref 211–911)
VIT D+METAB SERPL-MCNC: 62.3 NG/ML (ref 30–100)
VLDLC SERPL CALC-MCNC: 11 MG/DL (ref 0–30)
WBC # BLD AUTO: 6.6 X10(3) UL (ref 4–11)
WBC # BLD AUTO: 7.7 X10(3) UL (ref 4–11)

## 2025-07-16 PROCEDURE — 96365 THER/PROPH/DIAG IV INF INIT: CPT

## 2025-07-16 PROCEDURE — 99285 EMERGENCY DEPT VISIT HI MDM: CPT

## 2025-07-16 PROCEDURE — 80053 COMPREHEN METABOLIC PANEL: CPT | Performed by: FAMILY MEDICINE

## 2025-07-16 PROCEDURE — 80053 COMPREHEN METABOLIC PANEL: CPT | Performed by: STUDENT IN AN ORGANIZED HEALTH CARE EDUCATION/TRAINING PROGRAM

## 2025-07-16 PROCEDURE — 99284 EMERGENCY DEPT VISIT MOD MDM: CPT

## 2025-07-16 PROCEDURE — 84484 ASSAY OF TROPONIN QUANT: CPT | Performed by: STUDENT IN AN ORGANIZED HEALTH CARE EDUCATION/TRAINING PROGRAM

## 2025-07-16 PROCEDURE — 82306 VITAMIN D 25 HYDROXY: CPT | Performed by: FAMILY MEDICINE

## 2025-07-16 PROCEDURE — 85025 COMPLETE CBC W/AUTO DIFF WBC: CPT | Performed by: STUDENT IN AN ORGANIZED HEALTH CARE EDUCATION/TRAINING PROGRAM

## 2025-07-16 PROCEDURE — 86376 MICROSOMAL ANTIBODY EACH: CPT | Performed by: FAMILY MEDICINE

## 2025-07-16 PROCEDURE — 83735 ASSAY OF MAGNESIUM: CPT | Performed by: STUDENT IN AN ORGANIZED HEALTH CARE EDUCATION/TRAINING PROGRAM

## 2025-07-16 PROCEDURE — 86800 THYROGLOBULIN ANTIBODY: CPT | Performed by: FAMILY MEDICINE

## 2025-07-16 PROCEDURE — 85025 COMPLETE CBC W/AUTO DIFF WBC: CPT | Performed by: FAMILY MEDICINE

## 2025-07-16 PROCEDURE — 84443 ASSAY THYROID STIM HORMONE: CPT

## 2025-07-16 PROCEDURE — 80061 LIPID PANEL: CPT

## 2025-07-16 PROCEDURE — 82607 VITAMIN B-12: CPT

## 2025-07-16 PROCEDURE — 93005 ELECTROCARDIOGRAM TRACING: CPT

## 2025-07-16 PROCEDURE — 93010 ELECTROCARDIOGRAM REPORT: CPT

## 2025-07-16 PROCEDURE — 36415 COLL VENOUS BLD VENIPUNCTURE: CPT

## 2025-07-16 PROCEDURE — 82746 ASSAY OF FOLIC ACID SERUM: CPT

## 2025-07-16 RX ORDER — POTASSIUM CHLORIDE 1.5 G/1.58G
80 POWDER, FOR SOLUTION ORAL ONCE
Status: COMPLETED | OUTPATIENT
Start: 2025-07-16 | End: 2025-07-16

## 2025-07-16 RX ORDER — MAGNESIUM SULFATE 1 G/100ML
1 INJECTION INTRAVENOUS ONCE
Status: COMPLETED | OUTPATIENT
Start: 2025-07-16 | End: 2025-07-16

## 2025-07-16 RX ORDER — POTASSIUM CHLORIDE 1.5 G/1.58G
40 POWDER, FOR SOLUTION ORAL DAILY
Qty: 20 PACKET | Refills: 0 | Status: SHIPPED | OUTPATIENT
Start: 2025-07-16 | End: 2025-07-26

## 2025-07-16 NOTE — ED INITIAL ASSESSMENT (HPI)
Sent here by PCP for potassium of 2.8, labs drawn this morning. States had some chest tightness yesterday.

## 2025-07-17 ENCOUNTER — TELEPHONE (OUTPATIENT)
Dept: FAMILY MEDICINE CLINIC | Facility: CLINIC | Age: 60
End: 2025-07-17

## 2025-07-17 DIAGNOSIS — E87.6 HYPOKALEMIA: Primary | ICD-10-CM

## 2025-07-17 LAB
ATRIAL RATE: 70 BPM
P AXIS: 60 DEGREES
P-R INTERVAL: 150 MS
Q-T INTERVAL: 428 MS
QRS DURATION: 80 MS
QTC CALCULATION (BEZET): 462 MS
R AXIS: 26 DEGREES
T AXIS: 56 DEGREES
VENTRICULAR RATE: 70 BPM

## 2025-07-17 NOTE — ED QUICK NOTES
Rounding Completed    Plan of Care reviewed. Waiting for disposition.  Elimination needs assessed.    Bed is locked and in lowest position. Call light within reach.

## 2025-07-17 NOTE — ED QUICK NOTES
Rounding Completed    Plan of Care reviewed. Waiting for disposition.  Elimination needs assessed.  Provided water and warm blankets.    Bed is locked and in lowest position. Call light within reach.

## 2025-07-17 NOTE — TELEPHONE ENCOUNTER
Patient reports went to ED last night as advised for low potassium  (See ED visit 07/16/25)  Patient was given IV magnesium and PO potassium  Recheck labs in epic - last potassium 2.7  Rx'd PO potassium 40 mg daily for 10 days    Patient reports plans to travel today - plans to leave at noon  Has filiberto in [place]  7 [address] drive, camps overnight  Closest medical facility is 25-30 miles away    Does patient need any other testing?  Spouse concerned that patient's potassium crashes and they would not know    Looking for recommendations    Ok to travel today?  Please advise, thank you

## 2025-07-17 NOTE — TELEPHONE ENCOUNTER
Per Dr. Savage: As long as she has stopped diuretic she should be fine. Have her eat banana a day and repeat lab in one week.     Future lab order placed    Left detailed message to voicemail (per verbal release form consent with confirmed identifying message) of Dr. Savage's note above, future lab order placed, advised to continue Rx potassium, and to bring BP machine if possible. Patient was advised to call office back with any questions/concerns.

## 2025-07-17 NOTE — ED PROVIDER NOTES
History     Chief Complaint   Patient presents with    Abnormal Labs       HPI    59 year old female sent in for abnormal labs.  Patient had lab work checked by her doctor noted to be hypokalemic and sent to the ER.  She does note that she has had intermittent cramps and spasms of her legs mostly at nighttime for the past couple weeks.  She does note that she had an episode of chest tightness yesterday, she exercises about 2 hours a day and does not have any exertional symptoms.  She states for the past couple days she has had poor appetite.  Patient is on the low-dose chlorthalidone for blood pressure, she notes that her blood pressure has been very well-controlled as patient has lost substantial weight with exercise and zebound in recent years.  She was in discussions to stop this medicine with her doctor.        Past Medical History[1]    Past Surgical History[2]    No pertinent social history.                 Physical Exam     ED Triage Vitals [07/16/25 1828]   /83   Pulse 72   Resp 17   Temp 98.1 °F (36.7 °C)   Temp src Temporal   SpO2 97 %   O2 Device None (Room air)       Physical Exam  Constitutional:       General: She is not in acute distress.  Eyes:      Extraocular Movements: Extraocular movements intact.   Cardiovascular:      Rate and Rhythm: Normal rate and regular rhythm.      Pulses: Normal pulses.   Pulmonary:      Effort: Pulmonary effort is normal. No respiratory distress.      Breath sounds: Normal breath sounds.   Musculoskeletal:      Cervical back: Normal range of motion.   Neurological:      General: No focal deficit present.      Mental Status: She is alert.              ED Course     Labs Reviewed   COMP METABOLIC PANEL (14) - Abnormal; Notable for the following components:       Result Value    Potassium 2.7 (*)     Chloride 97 (*)     All other components within normal limits   TROPONIN I HIGH SENSITIVITY - Normal   MAGNESIUM - Normal   CBC WITH DIFFERENTIAL WITH PLATELET      No results found.        [facility]     Vitals:    07/16/25 2045 07/16/25 2100 07/16/25 2130 07/16/25 2200   BP: 156/78 152/86 120/74 118/75   Pulse: 61 72 55 60   Resp: 12 16 16 16   Temp:       TempSrc:       SpO2: 100% 100% 100% 100%       I reviewed labs done in the outpatient setting, noted hypokalemia and slight alkalosis.  Potential contraction alkalosis from mild dehydration.  May be compounded by her chlorthalidone.  Will replete potassium and prophylactic magnesium repletion.  Saline for alkalosis and slight hypo chloremia.  This is likely contributing to patient's muscle spasms.  Will replete potassium, prophylactic magnesium, saline.  Labs here confirming prior changes.  Patient was hemodynamically stable and comfortable after infusion, stable to be discharged home.  Prescription for potassium supplementation given.  Potassium rich foods.  Patient should have labs repeated by primary care physician in the next few days to ensure resolution.  She will stop her diuretic and monitor blood pressure    ED Course as of 07/16/25 2338  ------------------------------------------------------------  Time: 07/16 2026  Comment: EKG interpretation by me: EKG sinus rhythm at a rate of 70, axis nomal, no concerning acute ischemic ST changes, appears similar to prior EKGs           Disposition and Plan     Clinical Impression:  1. Hypokalemia        Disposition:  Discharge    Follow-up:  Rebel Gallagher MD  [address] [facility] [address]  [phone]    Follow up        Medications Prescribed:  Discharge Medication List as of 7/16/2025 10:27 PM        START taking these medications    Details   potassium chloride 20 MEQ Oral Powd Pack Take 40 mEq by mouth daily for 10 doses., Normal, Disp-20 packet, R-0                      [1]   Past Medical History:   Thyroid disease   [2]   Past Surgical History:  Procedure Laterality Date    Hysterectomy  12/05/2017    total    Upper arm/elbow surgery unlisted Left      Left forearm

## 2025-07-17 NOTE — TELEPHONE ENCOUNTER
SPOUSE CALLED AND ADV HAVE PT BE EVALUATED YESTERDAY AT HOSPITAL.    PT WAS DISCHARGED YESTERDAY AND WOULD LIKE TO KNOW IF ITS OK TO TRAVEL TODAY.    PLEASE CALL AND ADV    THANK YOU

## 2025-07-19 ENCOUNTER — TELEPHONE (OUTPATIENT)
Dept: FAMILY MEDICINE CLINIC | Facility: CLINIC | Age: 60
End: 2025-07-19

## 2025-07-19 NOTE — TELEPHONE ENCOUNTER
Patient sent My Chart request for ER follow up and abnormal labs    No available appts?    Please adv  Thank you

## 2025-07-22 ENCOUNTER — TELEPHONE (OUTPATIENT)
Dept: FAMILY MEDICINE CLINIC | Facility: CLINIC | Age: 60
End: 2025-07-22

## 2025-07-22 ENCOUNTER — HOSPITAL ENCOUNTER (EMERGENCY)
Facility: HOSPITAL | Age: 60
Discharge: HOME | End: 2025-07-22
Attending: EMERGENCY MEDICINE
Payer: COMMERCIAL

## 2025-07-22 ENCOUNTER — PATIENT MESSAGE (OUTPATIENT)
Dept: INTERNAL MEDICINE CLINIC | Facility: CLINIC | Age: 60
End: 2025-07-22

## 2025-07-22 VITALS
DIASTOLIC BLOOD PRESSURE: 71 MMHG | RESPIRATION RATE: 12 BRPM | TEMPERATURE: 98 F | WEIGHT: 174 LBS | OXYGEN SATURATION: 99 % | HEART RATE: 61 BPM | HEIGHT: 66 IN | BODY MASS INDEX: 27.97 KG/M2 | SYSTOLIC BLOOD PRESSURE: 112 MMHG

## 2025-07-22 DIAGNOSIS — R19.7 NAUSEA VOMITING AND DIARRHEA: Primary | ICD-10-CM

## 2025-07-22 DIAGNOSIS — R11.2 NAUSEA VOMITING AND DIARRHEA: Primary | ICD-10-CM

## 2025-07-22 LAB
ALBUMIN SERPL-MCNC: 4.6 G/DL (ref 3.2–4.8)
ALBUMIN/GLOB SERPL: 1.5 (ref 1–2)
ALP LIVER SERPL-CCNC: 59 U/L (ref 46–118)
ALT SERPL-CCNC: 20 U/L (ref 10–49)
ANION GAP SERPL CALC-SCNC: 7 MMOL/L (ref 0–18)
AST SERPL-CCNC: 19 U/L (ref ?–34)
ATRIAL RATE: 88 BPM
BASOPHILS # BLD AUTO: 0.02 X10(3) UL (ref 0–0.2)
BASOPHILS NFR BLD AUTO: 0.2 %
BILIRUB SERPL-MCNC: 0.6 MG/DL (ref 0.3–1.2)
BILIRUB UR QL STRIP.AUTO: NEGATIVE
BUN BLD-MCNC: 17 MG/DL (ref 9–23)
CALCIUM BLD-MCNC: 9.8 MG/DL (ref 8.7–10.6)
CHLORIDE SERPL-SCNC: 107 MMOL/L (ref 98–112)
CLARITY UR REFRACT.AUTO: CLEAR
CO2 SERPL-SCNC: 29 MMOL/L (ref 21–32)
CREAT BLD-MCNC: 0.97 MG/DL (ref 0.55–1.02)
EGFRCR SERPLBLD CKD-EPI 2021: 68 ML/MIN/1.73M2 (ref 60–?)
EOSINOPHIL # BLD AUTO: 0.07 X10(3) UL (ref 0–0.7)
EOSINOPHIL NFR BLD AUTO: 0.6 %
ERYTHROCYTE [DISTWIDTH] IN BLOOD BY AUTOMATED COUNT: 12.8 %
GLOBULIN PLAS-MCNC: 3.1 G/DL (ref 2–3.5)
GLUCOSE BLD-MCNC: 114 MG/DL (ref 70–99)
GLUCOSE UR STRIP.AUTO-MCNC: NORMAL MG/DL
HCT VFR BLD AUTO: 46.1 % (ref 35–48)
HGB BLD-MCNC: 15.3 G/DL (ref 12–16)
IMM GRANULOCYTES # BLD AUTO: 0.03 X10(3) UL (ref 0–1)
IMM GRANULOCYTES NFR BLD: 0.3 %
KETONES UR STRIP.AUTO-MCNC: NEGATIVE MG/DL
LEUKOCYTE ESTERASE UR QL STRIP.AUTO: NEGATIVE
LIPASE SERPL-CCNC: 40 U/L (ref 12–53)
LYMPHOCYTES # BLD AUTO: 1.26 X10(3) UL (ref 1–4)
LYMPHOCYTES NFR BLD AUTO: 10.6 %
MCH RBC QN AUTO: 28.9 PG (ref 26–34)
MCHC RBC AUTO-ENTMCNC: 33.2 G/DL (ref 31–37)
MCV RBC AUTO: 87.1 FL (ref 80–100)
MONOCYTES # BLD AUTO: 0.42 X10(3) UL (ref 0.1–1)
MONOCYTES NFR BLD AUTO: 3.5 %
NEUTROPHILS # BLD AUTO: 10.14 X10 (3) UL (ref 1.5–7.7)
NEUTROPHILS # BLD AUTO: 10.14 X10(3) UL (ref 1.5–7.7)
NEUTROPHILS NFR BLD AUTO: 84.8 %
NITRITE UR QL STRIP.AUTO: NEGATIVE
OSMOLALITY SERPL CALC.SUM OF ELEC: 298 MOSM/KG (ref 275–295)
P-R INTERVAL: 148 MS
PH UR STRIP.AUTO: 5 (ref 5–8)
PLATELET # BLD AUTO: 344 10(3)UL (ref 150–450)
POTASSIUM SERPL-SCNC: 4 MMOL/L (ref 3.5–5.1)
PROT SERPL-MCNC: 7.7 G/DL (ref 5.7–8.2)
PROT UR STRIP.AUTO-MCNC: NEGATIVE MG/DL
Q-T INTERVAL: 356 MS
QRS DURATION: 76 MS
QTC CALCULATION (BEZET): 430 MS
R AXIS: -22 DEGREES
RBC # BLD AUTO: 5.29 X10(6)UL (ref 3.8–5.3)
RBC UR QL AUTO: NEGATIVE
SODIUM SERPL-SCNC: 143 MMOL/L (ref 136–145)
SP GR UR STRIP.AUTO: 1.03 (ref 1–1.03)
T AXIS: 98 DEGREES
TROPONIN I SERPL HS-MCNC: <3 NG/L (ref ?–34)
UROBILINOGEN UR STRIP.AUTO-MCNC: NORMAL MG/DL
VENTRICULAR RATE: 88 BPM
WBC # BLD AUTO: 11.9 X10(3) UL (ref 4–11)

## 2025-07-22 PROCEDURE — 93010 ELECTROCARDIOGRAM REPORT: CPT

## 2025-07-22 PROCEDURE — 84484 ASSAY OF TROPONIN QUANT: CPT | Performed by: EMERGENCY MEDICINE

## 2025-07-22 PROCEDURE — 81003 URINALYSIS AUTO W/O SCOPE: CPT | Performed by: EMERGENCY MEDICINE

## 2025-07-22 PROCEDURE — 80053 COMPREHEN METABOLIC PANEL: CPT | Performed by: EMERGENCY MEDICINE

## 2025-07-22 PROCEDURE — 96361 HYDRATE IV INFUSION ADD-ON: CPT

## 2025-07-22 PROCEDURE — 96374 THER/PROPH/DIAG INJ IV PUSH: CPT

## 2025-07-22 PROCEDURE — 99285 EMERGENCY DEPT VISIT HI MDM: CPT

## 2025-07-22 PROCEDURE — 83690 ASSAY OF LIPASE: CPT | Performed by: EMERGENCY MEDICINE

## 2025-07-22 PROCEDURE — 85025 COMPLETE CBC W/AUTO DIFF WBC: CPT | Performed by: EMERGENCY MEDICINE

## 2025-07-22 PROCEDURE — 93005 ELECTROCARDIOGRAM TRACING: CPT

## 2025-07-22 RX ORDER — ONDANSETRON 2 MG/ML
4 INJECTION INTRAMUSCULAR; INTRAVENOUS ONCE
Status: COMPLETED | OUTPATIENT
Start: 2025-07-22 | End: 2025-07-22

## 2025-07-22 RX ORDER — ONDANSETRON 4 MG/1
4 TABLET, ORALLY DISINTEGRATING ORAL EVERY 4 HOURS PRN
Qty: 10 TABLET | Refills: 0 | Status: SHIPPED | OUTPATIENT
Start: 2025-07-22 | End: 2025-07-29

## 2025-07-22 NOTE — TELEPHONE ENCOUNTER
Patient had low potassium and sent to ER on 7/16/25.  She was treated and released and on po potassium  She got sick again and went back in.  Level is 4.0 in computer. Spouse answered phone as patient is lying down.  They wanted to be sure that zepbound would not be causing the levels to fluctuate.  Explained the medicine would not unless she is dehydrated (losing fluids through vomiting, diarrhea or inability to drink enough fluids).  They will call if anything else is needed.

## 2025-07-22 NOTE — TELEPHONE ENCOUNTER
Constant diarrhea since midnight and vomiting started at 5:00 am    Vomiting ended about 8:30 but still having diarrhea    Pulse 103  Blood pressure 128/90 (high for patient)    Spouse concerned about repeat potassium level due to be done today    Please adv  Thank you

## 2025-07-22 NOTE — TELEPHONE ENCOUNTER
Patient's spouse, with patient, reports patient taking Rx 40 mg potassium - last dose last night    Every night since starting potassium -  stomach feeling off  Diarrhea started last night - still remains - pure liquid - \"like when doing colonoscopy prep\"  Vomiting started 5am, but has stopped  No blood in stools or in emesis    Unable to eat today  Did have dinner last night, but poor appetite since starting Rx potassium     - normally in 50-60s  No fevers  Patient c/o chills  Unable to take in a lot of liquid  Water has electrolytes - very little - feels nauseous    Patient feels \"woozy\"  Tingling to lower legs - not new  Overall body aches    Patient was supposed to have CMP done today    Couple days ago - took OTC tylenol - for general aches and headache    Asking if should get CMP lab done today?  Looking for recommendations    Please advise, thank you

## 2025-07-22 NOTE — ED PROVIDER NOTES
Patient Seen in: [facility] Department        History  Chief Complaint   Patient presents with    Nausea/Vomiting/Diarrhea    Arrythmia/Palpitations     Stated Complaint: Seen here last wednesday with low K. now has diarrhea, vomiting, racing HR    Subjective:   HPI            59-year-old female presents reporting vomiting and diarrhea.  Reports she was seen here last week and diagnosed with low potassium at 2.7.  She has been taking potassium supplements at home since that time.  Last night after taking her potassium she felt some GI upset and then overnight developed vomiting and diarrhea.  Denies any abdominal pain.  No fevers.  No known sick contacts.      Objective:     Past Medical History:    Thyroid disease              Past Surgical History:   Procedure Laterality Date    Hysterectomy  12/05/2017    total    Upper arm/elbow surgery unlisted Left     Left forearm                 Social History     Socioeconomic History    Marital status:    Tobacco Use    Smoking status: Never     Passive exposure: Never    Smokeless tobacco: Never   Vaping Use    Vaping status: Never Used   Substance and Sexual Activity    Alcohol use: No    Drug use: No     Social Drivers of Health     Food Insecurity: No Food Insecurity (1/28/2025)    NCSS - Food Insecurity     Worried About Running Out of Food in the Last Year: No     Ran Out of Food in the Last Year: No   Transportation Needs: No Transportation Needs (1/28/2025)    NCSS - Transportation     Lack of Transportation: No   Housing Stability: Not At Risk (1/28/2025)    NCSS - Housing/Utilities     Has Housing: Yes     Worried About Losing Housing: No     Unable to Get Utilities: No                                Physical Exam    ED Triage Vitals   BP 07/22/25 1042 (!) 147/97   Pulse 07/22/25 1041 103   Resp 07/22/25 1041 18   Temp 07/22/25 1042 97.7 °F (36.5 °C)   Temp src 07/22/25 1042 Temporal   SpO2 07/22/25 1041 100 %   O2 Device 07/22/25 1041  None (Room air)       Current Vitals:   Vital Signs  BP: (!) 147/97  Pulse: 103  Resp: 18  Temp: 97.7 °F (36.5 °C)  Temp src: Temporal    Oxygen Therapy  SpO2: 100 %  O2 Device: None (Room air)            Physical Exam  General:  Vitals as listed.  No acute distress   Heart: regular rate rhythm and no murmur   Abdomen: Soft and nontender.  No abdominal masses.  No peritoneal signs   Extremities: no edema, normal peripheral pulses   Neuro: Alert oriented and nonfocal   Skin: no rashes or nodules        ED Course  Labs Reviewed   COMP METABOLIC PANEL (14) - Abnormal; Notable for the following components:       Result Value    Glucose 114 (*)     Calculated Osmolality 298 (*)     All other components within normal limits   CBC WITH DIFFERENTIAL WITH PLATELET - Abnormal; Notable for the following components:    WBC 11.9 (*)     Neutrophil Absolute Prelim 10.14 (*)     Neutrophil Absolute 10.14 (*)     All other components within normal limits   LIPASE - Normal   TROPONIN I HIGH SENSITIVITY - Normal   URINALYSIS, ROUTINE   RAINBOW DRAW LAVENDER   RAINBOW DRAW LIGHT GREEN   RAINBOW DRAW BLUE     EKG    Rate, intervals and axes as noted on EKG Report.  Rate: 88  Rhythm: Sinus Rhythm  Reading: No acute ischemia or arrhythmia.  No peaked T waves                                MDM     59-year-old female presents reporting nausea, vomiting, diarrhea.  She thinks it is related to potassium supplements.  She has been taking Klor-Con 40 mEq daily    Additional history obtained by  at the bedside who reports that nobody else at home has been sick with similar symptoms    Differential includes but is not limited to hypokalemia, vomiting, diarrhea, a life/function threat.    CBC, CMP, EKG ordered for further evaluation.    My independent interpretation of laboratory evaluation is that potassium is within normal limits.  Remainder of laboratory evaluation is unremarkable.  Minimal leukocytosis at 11.9.  Unclear if this is  medication related or viral gastroenteritis.  She has follow-up with her primary care doctor tomorrow.  Should discuss potassium regulation at that time.  Return with any concerns.  Home with Zofran for nausea control.            Medical Decision Making      Disposition and Plan     Clinical Impression:  1. Nausea vomiting and diarrhea         Disposition:  Discharge  7/22/2025 12:45 pm    Follow-up:  Rebel Gallagher MD  [address] [address]  [phone]    Follow up      Rebel Gallagher MD  [address] [address]  [phone]    Follow up            Medications Prescribed:  Current Discharge Medication List        START taking these medications    Details   ondansetron 4 MG Oral Tablet Dispersible Take 1 tablet (4 mg total) by mouth every 4 (four) hours as needed for Nausea.  Qty: 10 tablet, Refills: 0                   Supplementary Documentation:

## 2025-07-22 NOTE — ED INITIAL ASSESSMENT (HPI)
Patient to ER w/ complaints of n/v/d & chest tightening since last night.  Recently started on oral potassium.   
room air

## 2025-07-22 NOTE — TELEPHONE ENCOUNTER
Please see other TE 07/22/25 - spouse called to state patient went to ER    Please see ED visit 07/22/25    Called and spoke with patient's spouse - he reports patient started complaining of chest pain, so didn't want to wait any longer - went to ER in [place].  Patient had labs complete. Patient's potassium within normal limits, but WBC elevated, so running UA.  Advised patient's spouse that patient already with follow-up appt scheduled for tomorrow - he verbalized understanding.  No further questions at this time    Future Appointments   Date Time Provider Department Center   7/23/2025 10:00 AM Rebel Gallagher MD [facility]

## 2025-07-23 ENCOUNTER — OFFICE VISIT (OUTPATIENT)
Dept: FAMILY MEDICINE CLINIC | Facility: CLINIC | Age: 60
End: 2025-07-23
Payer: COMMERCIAL

## 2025-07-23 ENCOUNTER — LAB ENCOUNTER (OUTPATIENT)
Dept: LAB | Age: 60
End: 2025-07-23
Attending: FAMILY MEDICINE
Payer: COMMERCIAL

## 2025-07-23 ENCOUNTER — TELEPHONE (OUTPATIENT)
Dept: FAMILY MEDICINE CLINIC | Facility: CLINIC | Age: 60
End: 2025-07-23

## 2025-07-23 VITALS
HEART RATE: 50 BPM | BODY MASS INDEX: 28.87 KG/M2 | DIASTOLIC BLOOD PRESSURE: 62 MMHG | RESPIRATION RATE: 18 BRPM | WEIGHT: 179.63 LBS | SYSTOLIC BLOOD PRESSURE: 110 MMHG | TEMPERATURE: 97 F | HEIGHT: 66 IN | OXYGEN SATURATION: 100 %

## 2025-07-23 DIAGNOSIS — K52.9 CHRONIC DIARRHEA: ICD-10-CM

## 2025-07-23 DIAGNOSIS — E87.6 HYPOKALEMIA: Primary | ICD-10-CM

## 2025-07-23 DIAGNOSIS — R11.0 NAUSEA: ICD-10-CM

## 2025-07-23 DIAGNOSIS — R94.31 ABNORMAL EKG: ICD-10-CM

## 2025-07-23 LAB
ANION GAP SERPL CALC-SCNC: 7 MMOL/L (ref 0–18)
BUN BLD-MCNC: 14 MG/DL (ref 9–23)
CALCIUM BLD-MCNC: 9.2 MG/DL (ref 8.7–10.6)
CHLORIDE SERPL-SCNC: 107 MMOL/L (ref 98–112)
CO2 SERPL-SCNC: 28 MMOL/L (ref 21–32)
CREAT BLD-MCNC: 0.88 MG/DL (ref 0.55–1.02)
EGFRCR SERPLBLD CKD-EPI 2021: 76 ML/MIN/1.73M2 (ref 60–?)
FASTING STATUS PATIENT QL REPORTED: YES
GLUCOSE BLD-MCNC: 90 MG/DL (ref 70–99)
OSMOLALITY SERPL CALC.SUM OF ELEC: 294 MOSM/KG (ref 275–295)
POTASSIUM SERPL-SCNC: 3.8 MMOL/L (ref 3.5–5.1)
SODIUM SERPL-SCNC: 142 MMOL/L (ref 136–145)

## 2025-07-23 PROCEDURE — 80048 BASIC METABOLIC PNL TOTAL CA: CPT | Performed by: FAMILY MEDICINE

## 2025-07-23 PROCEDURE — 36415 COLL VENOUS BLD VENIPUNCTURE: CPT | Performed by: FAMILY MEDICINE

## 2025-07-23 PROCEDURE — 99214 OFFICE O/P EST MOD 30 MIN: CPT | Performed by: FAMILY MEDICINE

## 2025-07-23 NOTE — PROGRESS NOTES
The following individual(s) verbally consented to be recorded using ambient AI listening technology and understand that they can each withdraw their consent to this listening technology at any point by asking the clinician to turn off or pause the recording:    Patient name: Doreen Dexter  Additional names:  none        Chief Complaint   Patient presents with    Hospital F/U     7/16 Hypokalemic  7/23 Nausea, Vomiting, Diarrhea, abnormal EKG    Pt states they are wearing depends because are scared they might not make it ot the bathroom in time due to excessive diarrhea; Stopped taking Potass Chlo on Monday 7/21       History of Present Illness  Doreen Dexter is a 59 year old female who presents with persistent diarrhea and vomiting following potassium supplementation.    She has been experiencing persistent diarrhea since taking potassium supplements, with symptoms starting immediately after ingestion and worsening significantly at midnight, continuing until 8 AM. She has not taken the potassium since Monday night.    She also experiences vomiting and a general feeling of malaise, severe enough to warrant a visit to the emergency room. At the ER, her potassium level was noted to be 4.0, and an EKG was performed, initially reported as normal but later read as abnormal. She describes experiencing 'tightness and stuff' during these episodes.    She has not taken any other supplements or medications besides potassium and Zepbound 12.5 mg, which she has not administered since Sunday due to her illness. She has a history of significant weight loss, from 234 pounds to 175 pounds, and has not been able to work out since last Monday due to her symptoms.    Her family history is notable for her mother's death from colon cancer, and she typically undergoes colonoscopy screenings every five years, with the last one in 2018. She regularly sees a weight management doctor monthly.    She reports being sore,  particularly in the abdominal area, and has not had any imaging done recently.      Past Medical History[1]    Past Surgical History[2]    Social Hx on file[3]    Family History[4]     Medications Ordered Prior to Encounter[5]      Objective  Vitals:    07/23/25 1017   BP: 110/62   Pulse: 50   Resp: 18   Temp: 97.4 °F (36.3 °C)   TempSrc: Temporal   SpO2: 100%   Weight: 179 lb 9.6 oz (81.5 kg)   Height: 5' 6\" (1.676 m)         Body mass index is 28.99 kg/m².    Physical Exam  Constitutional:       Appearance: Normal appearance.   HEENT:      Head: Normocephalic and atraumatic.      Eyes: PERRLA no notable nystagmus     Ears: normal on observation     Nose: Nose normal.      Mouth: Mucous membranes are moist.      Neck: no masses no bruit  Cardiovascular:      Rate and Rhythm: Normal rate and regular rhythm.   Pulmonary:      Effort: Pulmonary effort is normal.      Breath sounds: Normal breath sounds.   Abdominal:      General: Bowel sounds are normal.      Palpations: Abdomen is soft. There is no mass.   Musculoskeletal:         General: Normal range of motion.      Cervical back: Normal range of motion.   Skin:     General: Skin is warm and dry.   Neurological:      General: No focal deficit present.      Mental Status: She is alert and oriented to person, place, and time.   Psychiatric:         Mood and Affect: Mood normal.         Thought Content: Thought content normal.       Assessment and Plan  Assessment & Plan  Diarrhea and Vomiting  Acute diarrhea and vomiting likely due to potassium supplementation. Potassium level was 4 at ER. Possible chronic gastroenteritis or reaction to potassium.  - Repeat potassium level today.  - Advised against further potassium supplementation.  - Encouraged hydration with Gatorade or Pedialyte.  - Referred to gastroenterologist for evaluation and possible colonoscopy.    Abnormal EKG  Abnormal EKG reported, suspected electrical issue due to potassium imbalance. No known cardiac  condition, previous heart screening in 2023 normal.  - Referred to cardiologist Dr. Fagan for evaluation.    General Health Maintenance  Wellness up to date except vaccinations. Not well enough for vaccinations today.  - Schedule follow-up visit for physical and vaccinations next week.    Follow-up  Follow-up needed for reassessment and wellness check. Additional imaging and lab work required.  - Schedule follow-up appointment in one week.  - Advised to return to ER if condition worsens.  - Order CT abdomen and pelvis after insurance authorization.      ICD-10-CM    1. Hypokalemia  E87.6 Basic Metabolic Panel (8) [E]     Cardio Referral - Internal     Basic Metabolic Panel (8) [E]      2. Abnormal EKG  R94.31 Cardio Referral - Internal      3. Chronic diarrhea  K52.9 Gastro Referral - In Network     CT ABDOMEN+PELVIS(CONTRAST ONLY)(CPT=74177)      4. Nausea  R11.0 CT ABDOMEN+PELVIS(CONTRAST ONLY)(CPT=74177)              Follow up  No follow-ups on file.      Patient Instructions  There are no Patient Instructions on file for this visit.       Rebel Gallagher MD       This note was created by Dragon voice recognition and or [email or web address] transcription service. Errors in content may be related to improper recognition by the system; efforts to review and correct have been done but errors may still exist. Please be advised the primary purpose of this note is for me to communicate medical care. Standard sentence structure is not always used. Medical terminology and medical abbreviations may be used. There may be grammatical, typographical, and automated fill ins that may have errors missed in proofreading.        [1]   Past Medical History:   Thyroid disease   [2]   Past Surgical History:  Procedure Laterality Date    Hysterectomy  12/05/2017    total    Upper arm/elbow surgery unlisted Left     Left forearm    [3]   Social History  Socioeconomic History    Marital status:    Tobacco Use    Smoking status: Never      Passive exposure: Never    Smokeless tobacco: Never   Vaping Use    Vaping status: Never Used   Substance and Sexual Activity    Alcohol use: No    Drug use: No   [4] No family history on file.  [5]   Current Outpatient Medications on File Prior to Visit   Medication Sig Dispense Refill    ondansetron 4 MG Oral Tablet Dispersible Take 1 tablet (4 mg total) by mouth every 4 (four) hours as needed for Nausea. (Patient not taking: Reported on 7/23/2025) 10 tablet 0    potassium chloride 20 MEQ Oral Powd Pack Take 40 mEq by mouth daily for 10 doses. (Patient not taking: Reported on 7/23/2025) 20 packet 0    atorvastatin 10 MG Oral Tab Take 1 tablet (10 mg total) by mouth daily. (Patient not taking: Reported on 7/23/2025)      ZENPEP 90950-534881 units Oral Cap DR Particles Take 1 capsule by mouth 3 (three) times daily before meals. (Patient not taking: Reported on 7/23/2025)      levothyroxine 50 MCG Oral Tab Take 1 tablet (50 mcg total) by mouth daily. (Patient not taking: Reported on 7/23/2025)      Cholecalciferol (VITAMIN D3) 1.25 MG (46739 UT) Oral Cap TAKE ONE CAPSULE BY MOUTH EVERY WEEK AS DIRECTED (Patient not taking: Reported on 7/23/2025)      chlorthalidone 25 MG Oral Tab Take 1 tablet (25 mg total) by mouth daily. (Patient not taking: Reported on 7/23/2025)       Current Facility-Administered Medications on File Prior to Visit   Medication Dose Route Frequency Provider Last Rate Last Admin    cyanocobalamin (Vitamin B12) 1000 MCG/ML injection 1,000 mcg  1,000 mcg Intramuscular Once         ipratropium-albuterol (DUONEB) nebulizer solution 3 mL  3 mL Nebulization Once Stacie Martinez APRN

## 2025-07-23 NOTE — TELEPHONE ENCOUNTER
Reviewed patient's chart -   Office visit 07/23/25 - per Dr. Savage: \"Schedule follow-up visit for physical and vaccinations next week.\"    Discussed with Dr. Savage regarding note below/above: ok to schedule at soonest available physical appt time, does not have to be next week. Thank you

## 2025-07-23 NOTE — TELEPHONE ENCOUNTER
Please see Dr. Savage's note below    Please schedule annual wellness appt at earliest available time. Thank you

## 2025-07-23 NOTE — TELEPHONE ENCOUNTER
Pt is not due for a px until January 28,2026.  Do you want pt to wait until then?  Please advise  Thank you!

## 2025-07-23 NOTE — TELEPHONE ENCOUNTER
Office visit 01/28/25 with Dr. Savage - to establish care  Visit does state \"Well adult, Physical\" - but not used as diagnosis    Is patient due for physical?  Please advise, thank you

## 2025-07-23 NOTE — TELEPHONE ENCOUNTER
Pt states she was told to schedule a f/u appt one week from today  Please advise where to place pt on schedule.  Thank you!

## 2025-07-24 ENCOUNTER — TELEPHONE (OUTPATIENT)
Dept: FAMILY MEDICINE CLINIC | Facility: CLINIC | Age: 60
End: 2025-07-24

## 2025-07-24 DIAGNOSIS — R19.7 DIARRHEA, UNSPECIFIED TYPE: Primary | ICD-10-CM

## 2025-07-24 NOTE — TELEPHONE ENCOUNTER
Pt scheduled for PX in September. Pt would like to know when she should take her Zepbound 12.5.  States she was told to hold off for one week until her 1 week f/u but f/u changed to PX.  Please advise what to do regarding medication.  Thank you!    Future Appointments   Date Time Provider Department Center   7/31/2025 12:00 PM Pily Woods MD [facility]   7/31/2025  9:00 PM PF CT RM1 PF CT [place]   8/6/2025  1:00 PM PF US RM2 PF US [place]   8/21/2025 11:00 AM Pily Woods MD [facility]   9/9/2025  9:30 AM Rebel Gallagher MD [facility] [place]   9/15/2025  9:20 AM Ama Baer DO [facility]   9/25/2025 12:00 PM Pily Woods MD [facility]   10/28/2025 12:00 PM Pily Woods MD [facility]   11/19/2025 10:00 AM Pily Woods MD [facility]   12/8/2025 10:20 AM Pily Woods MD [facility]   1/28/2026 10:20 AM Pily Woods MD [facility]   2/25/2026 10:00 AM Pily Woods MD [facility]   3/25/2026 10:00 AM Pily Woods MD [facility]   4/29/2026 10:00 AM Pily Woods MD [facility]

## 2025-07-25 ENCOUNTER — TELEPHONE (OUTPATIENT)
Dept: INTERNAL MEDICINE CLINIC | Facility: CLINIC | Age: 60
End: 2025-07-25

## 2025-07-25 DIAGNOSIS — E66.9 OBESITY (BMI 30-39.9): ICD-10-CM

## 2025-07-25 DIAGNOSIS — I10 ESSENTIAL HYPERTENSION: ICD-10-CM

## 2025-07-25 DIAGNOSIS — E78.2 MIXED HYPERLIPIDEMIA: ICD-10-CM

## 2025-07-25 DIAGNOSIS — E87.6 HYPOKALEMIA: Primary | ICD-10-CM

## 2025-07-25 DIAGNOSIS — R73.01 IFG (IMPAIRED FASTING GLUCOSE): ICD-10-CM

## 2025-07-25 RX ORDER — TIRZEPATIDE 5 MG/.5ML
5 INJECTION, SOLUTION SUBCUTANEOUS WEEKLY
Qty: 2 ML | Refills: 0 | Status: SHIPPED | OUTPATIENT
Start: 2025-07-25 | End: 2025-08-16

## 2025-07-25 NOTE — TELEPHONE ENCOUNTER
Patient LVM she is suppose to take zepbound on Sunday.   She has been to ER 2 x first for hypokalemia and then she had hyperkalemia.    She last had a shot 7/13/25 at 12.5 mg weekly  She has a 10 mg dose as well at home.  Can she take the 12.5 mg dose    Primary care was seen and she thinks zepbound is causing constipation and wants her to hold med.  Patient said she was having bowel movements - less frequently but they were normal.  Denies any belly pain.    She thinks she got sick from the potassium  and feels she can take the zepbound.  She is feeling better now would like to resume zepbound at 12.5 mg or 10 mg      Needs bmp in 2 weeks  Dr. Woods would like her to take 5 mg zepbound.    Lab order placed  Patient will take 5 mg and f/u.  She will call with any concern.  She will complete the CT scan as ordered by pcp.

## 2025-07-28 ENCOUNTER — LAB ENCOUNTER (OUTPATIENT)
Dept: LAB | Age: 60
End: 2025-07-28
Attending: FAMILY MEDICINE

## 2025-07-28 DIAGNOSIS — E78.2 MIXED HYPERLIPIDEMIA: ICD-10-CM

## 2025-07-28 DIAGNOSIS — R06.09 DYSPNEA ON EXERTION: Primary | ICD-10-CM

## 2025-07-28 DIAGNOSIS — I10 ESSENTIAL HYPERTENSION, MALIGNANT: ICD-10-CM

## 2025-07-28 LAB
ALBUMIN SERPL-MCNC: 4.1 G/DL (ref 3.2–4.8)
ALBUMIN/GLOB SERPL: 1.4 (ref 1–2)
ALP LIVER SERPL-CCNC: 60 U/L (ref 46–118)
ALT SERPL-CCNC: 19 U/L (ref 10–49)
ANION GAP SERPL CALC-SCNC: 9 MMOL/L (ref 0–18)
AST SERPL-CCNC: 18 U/L (ref ?–34)
BILIRUB SERPL-MCNC: 0.5 MG/DL (ref 0.3–1.2)
BUN BLD-MCNC: 14 MG/DL (ref 9–23)
CALCIUM BLD-MCNC: 9.5 MG/DL (ref 8.7–10.6)
CHLORIDE SERPL-SCNC: 105 MMOL/L (ref 98–112)
CO2 SERPL-SCNC: 29 MMOL/L (ref 21–32)
CREAT BLD-MCNC: 0.83 MG/DL (ref 0.55–1.02)
EGFRCR SERPLBLD CKD-EPI 2021: 82 ML/MIN/1.73M2 (ref 60–?)
FASTING STATUS PATIENT QL REPORTED: NO
GLOBULIN PLAS-MCNC: 3 G/DL (ref 2–3.5)
GLUCOSE BLD-MCNC: 86 MG/DL (ref 70–99)
MAGNESIUM SERPL-MCNC: 2 MG/DL (ref 1.6–2.6)
OSMOLALITY SERPL CALC.SUM OF ELEC: 296 MOSM/KG (ref 275–295)
POTASSIUM SERPL-SCNC: 4.2 MMOL/L (ref 3.5–5.1)
PROT SERPL-MCNC: 7.1 G/DL (ref 5.7–8.2)
SODIUM SERPL-SCNC: 143 MMOL/L (ref 136–145)

## 2025-07-28 PROCEDURE — 83735 ASSAY OF MAGNESIUM: CPT

## 2025-07-28 PROCEDURE — 80053 COMPREHEN METABOLIC PANEL: CPT

## 2025-07-28 PROCEDURE — 36415 COLL VENOUS BLD VENIPUNCTURE: CPT

## 2025-07-31 ENCOUNTER — OFFICE VISIT (OUTPATIENT)
Dept: INTERNAL MEDICINE CLINIC | Facility: CLINIC | Age: 60
End: 2025-07-31
Payer: COMMERCIAL

## 2025-07-31 ENCOUNTER — MED REC SCAN ONLY (OUTPATIENT)
Dept: FAMILY MEDICINE CLINIC | Facility: CLINIC | Age: 60
End: 2025-07-31

## 2025-07-31 VITALS
BODY MASS INDEX: 30.82 KG/M2 | HEIGHT: 65 IN | RESPIRATION RATE: 18 BRPM | SYSTOLIC BLOOD PRESSURE: 118 MMHG | DIASTOLIC BLOOD PRESSURE: 76 MMHG | HEART RATE: 89 BPM | WEIGHT: 185 LBS

## 2025-07-31 DIAGNOSIS — E78.2 MIXED HYPERLIPIDEMIA: ICD-10-CM

## 2025-07-31 DIAGNOSIS — E87.6 HYPOKALEMIA: Primary | ICD-10-CM

## 2025-07-31 DIAGNOSIS — Z51.81 THERAPEUTIC DRUG MONITORING: ICD-10-CM

## 2025-07-31 DIAGNOSIS — E66.9 OBESITY (BMI 30-39.9): ICD-10-CM

## 2025-07-31 PROCEDURE — 99214 OFFICE O/P EST MOD 30 MIN: CPT | Performed by: INTERNAL MEDICINE

## 2025-07-31 RX ORDER — ROSUVASTATIN CALCIUM 5 MG/1
TABLET, COATED ORAL
COMMUNITY
Start: 2025-07-28

## 2025-07-31 RX ORDER — TIRZEPATIDE 7.5 MG/.5ML
7.5 INJECTION, SOLUTION SUBCUTANEOUS WEEKLY
Qty: 2 ML | Refills: 0 | Status: SHIPPED | OUTPATIENT
Start: 2025-07-31 | End: 2025-08-22

## 2025-07-31 RX ORDER — TIRZEPATIDE 2.5 MG/.5ML
2.5 INJECTION, SOLUTION SUBCUTANEOUS WEEKLY
Qty: 2 ML | Refills: 0 | Status: SHIPPED | OUTPATIENT
Start: 2025-07-31 | End: 2025-08-22

## 2025-08-05 ENCOUNTER — LAB ENCOUNTER (OUTPATIENT)
Dept: LAB | Age: 60
End: 2025-08-05
Attending: FAMILY MEDICINE

## 2025-08-05 DIAGNOSIS — R19.7 DIARRHEA, UNSPECIFIED TYPE: ICD-10-CM

## 2025-08-05 LAB
CRYPTOSP AG STL QL IA: NEGATIVE
G LAMBLIA AG STL QL IA: NEGATIVE

## 2025-08-05 PROCEDURE — 87177 OVA AND PARASITES SMEARS: CPT

## 2025-08-05 PROCEDURE — 87015 SPECIMEN INFECT AGNT CONCNTJ: CPT

## 2025-08-05 PROCEDURE — 83993 ASSAY FOR CALPROTECTIN FECAL: CPT

## 2025-08-05 PROCEDURE — 87427 SHIGA-LIKE TOXIN AG IA: CPT

## 2025-08-05 PROCEDURE — 87329 GIARDIA AG IA: CPT

## 2025-08-05 PROCEDURE — 87046 STOOL CULTR AEROBIC BACT EA: CPT

## 2025-08-05 PROCEDURE — 87045 FECES CULTURE AEROBIC BACT: CPT

## 2025-08-05 PROCEDURE — 87493 C DIFF AMPLIFIED PROBE: CPT

## 2025-08-05 PROCEDURE — 87209 SMEAR COMPLEX STAIN: CPT

## 2025-08-05 PROCEDURE — 87272 CRYPTOSPORIDIUM AG IF: CPT

## 2025-08-06 ENCOUNTER — TELEPHONE (OUTPATIENT)
Dept: FAMILY MEDICINE CLINIC | Facility: CLINIC | Age: 60
End: 2025-08-06

## 2025-08-07 LAB — CALPROTECTIN STL-MCNT: 23.9 ΜG/G (ref ?–50)

## 2025-08-08 ENCOUNTER — TELEPHONE (OUTPATIENT)
Dept: FAMILY MEDICINE CLINIC | Facility: CLINIC | Age: 60
End: 2025-08-08

## 2025-08-08 DIAGNOSIS — R19.7 DIARRHEA, UNSPECIFIED TYPE: Primary | ICD-10-CM

## 2025-08-08 DIAGNOSIS — K52.9 CHRONIC DIARRHEA: ICD-10-CM

## 2025-08-08 DIAGNOSIS — R11.0 NAUSEA: ICD-10-CM

## 2025-08-08 DIAGNOSIS — R94.31 ABNORMAL EKG: ICD-10-CM

## 2025-08-08 DIAGNOSIS — E87.6 HYPOKALEMIA: ICD-10-CM

## (undated) NOTE — MR AVS SNAPSHOT
[address] [phone]               Thank you for choosing us for your health care visit with Toshia Bhatia RD.   We are glad to serve you and happy to provide you with this summary of Commonly known as:  GLUCOPHAGE-XR           Phentermine HCl 15 MG Caps   Take 1 capsule (15 mg total) by mouth every morning.                    MyChart     Visit MyChart  You can access your MyChart to more actively manage your health care and view more de

## (undated) NOTE — MR AVS SNAPSHOT
[address] [phone]               Thank you for choosing us for your health care visit with LEIDY Aldrich.   We are glad to serve you and happy to provide you with this summary Assoc Dx:  Encounter for therapeutic drug monitoring [Z51.81], Morbid obesity with BMI of 40.0-44.9, adult ([facility] [E66.01, Z68.41]                 Follow-up Instructions     Return in about 4 weeks (around 4/7/2017).          Reason for Today's Visit     We stick to your plan and meet your goals:  · If you don’t meet a goal, don’t use it as an excuse to give up on your whole plan. Adjust your goal and try again. · Try to understand your own attitude about food.  Are you subject to emotional eating?   · Learn You can get these medications from any pharmacy     Bring a paper prescription for each of these medications    - Phentermine HCl 15 MG Caps            MyChart     Visit MyChart  You can access your MyChart to more actively manage your health care and view

## (undated) NOTE — LETTER
[facility], [address] [address] [facility], [facility], [address] [facility] [phone]            August 30, 2019      [address] [place],[address] [facility] Cord      Dear Ms.  Trace

## (undated) NOTE — ED AVS SNAPSHOT
Phillip Vásquezlla   MRN: [id number]    Department:  [address] [facility] in [place]   Date of Visit:  3/4/2020           Disclosure     Insurance plans vary and the physician(s) referred by the ER may not be covered by your plan.  Please cont tell this physician (or your personal doctor if your instructions are to return to your personal doctor) about any new or lasting problems. The primary care or specialist physician will see patients referred from the [facility] Emergency Department.  Luis Tello

## (undated) NOTE — Clinical Note
5/31/2017    Personal and Confidential  Via Certified Mail    [address] [address]    Dear Ms. Dexter,     At [place] [address], patient care is our priority.   To best serve our patients, our mission is to be a and respectful communication with your health care provider and the staff.   If you are unable to partner with us in a respectful manner going forward, our only option will be to terminate you as a patient from [address] (which would include all

## (undated) NOTE — MR AVS SNAPSHOT
[address] [phone]               Thank you for choosing us for your health care visit with Mirian Mensah RD.   We are glad to serve you and happy to provide you with this summary of SYNTHROID 88 MCG Tabs   Generic drug:  Levothyroxine Sodium   Take 88 mcg by mouth before breakfast.           VSL#3 Caps   Take 1 capsule by mouth every morning.                    MyChart     Visit AuditFile  You can access your MyChart to more actively ma increments are effective and add up over the week   2 ½ hours per week – spread out over time Use a luca to keep you motivated   Don’t forget strength training with weights and resistance Set goals and track your progress   You don’t need to join a gym.

## (undated) NOTE — LETTER
05/23/19        [facility]      Dear Master Blair records indicate that you have outstanding lab work and or testing that was ordered for you and has not yet been completed:  Orders Placed This Encounter

## (undated) NOTE — LETTER
12/28/17        [facility]      Dear Tran Bass records indicate that you have outstanding lab work and or testing that was ordered for you and has not yet been completed:          TSH W Reflex To Free

## (undated) NOTE — Clinical Note
04/10/2017        [address] [facility]      Dear Aguilar Jj,    [address] [facility] records indicate that you have outstanding lab work and or testing that was ordered for you and has not yet been completed:      Vitamin B12 [E]  Vitami

## (undated) NOTE — LETTER
[address] [facility], [id number] [place] [address] [address] [phone]            July 22, 2019      [address] [place] [phone]      Dear Ms. Maddox

## (undated) NOTE — MR AVS SNAPSHOT
[address] [phone]               Thank you for choosing us for your health care visit with Adam Yee RD.   We are glad to serve you and happy to provide you with this summary of Levonorgestrel-Ethinyl Estrad 0.1-20 MG-MCG Tabs   Take 1 tablet by mouth daily.    Commonly known as:  AVIANE,ALESSE,LESSINA           Levothyroxine Sodium 75 MCG Tabs   Take 75 mcg by mouth before breakfast.   Commonly known as:  SYNTHROID, LEVOTHROID

## (undated) NOTE — MR AVS SNAPSHOT
[address] [phone]               Thank you for choosing us for your health care visit with LEIDY Lott.   We are glad to serve you and happy to provide you with this summary rate, reflexes, muscle strength, or skin texture. Blood tests  Your healthcare provider will order blood tests. They may include:  · TSH test. This shows how much thyroid stimulating hormone (TSH) is being made by the pituitary gland.  This test can show i © 6825-0162 The [address] [place] [address], [address] [facility]. All rights reserved. This information is not intended as a substitute for professional medical care. Always follow your healthcare professional's instructions.              Fo Take 1 tablet (750 mg total) by mouth daily. Commonly known as:  GLUCOPHAGE-XR           Phentermine HCl 15 MG Caps   Take 1 capsule (15 mg total) by mouth every morning.                 Where to Get Your Medications      These medications were sent to [facility]

## (undated) NOTE — MR AVS SNAPSHOT
[address] [phone]               Thank you for choosing us for your health care visit with Ansley Vieira RD.   We are glad to serve you and happy to provide you with this summary of Take 75 mcg by mouth before breakfast.   Commonly known as:  SYNTHROID, LEVOTHROID           MetFORMIN HCl  MG Tb24   Take 1 tablet (750 mg total) by mouth daily.    Commonly known as:  GLUCOPHAGE-XR           Phentermine HCl 15 MG Caps   Take 1 capsu

## (undated) NOTE — MR AVS SNAPSHOT
[address] [phone]               Thank you for choosing us for your health care visit with LEIDY Winston.   We are glad to serve you and happy to provide you with this summary the descriptions of other entrees and specials. If another entree comes with baby carrots, ask for a side order of them even if they don’t come with your entree. Ask how food is prepared or if it can be made differently.   Tips for making the most of your m Take 1 tablet (37.5 mg total) by mouth every morning before breakfast.   Commonly known as:  ADIPEX-P           SYNTHROID 88 MCG Tabs   Generic drug:  Levothyroxine Sodium   Take 88 mcg by mouth before breakfast.   What changed:  Another medication with th

## (undated) NOTE — LETTER
9/1/2017    Personal and Confidential  Via Certified Mail    [phone] [facility] [address]    Dear Ms. Dexter,     Please accept this correspondence as notice that [place] [address] will no longer provide medical services Dept Fax: [phone]

## (undated) NOTE — LETTER
02/27/19        [address] [facility]      Dear Angela Fallon,    6716 [facility] records indicate that you have outstanding lab work and or testing that was ordered for you and has not yet been completed:      Comp Metabolic Panel (14)